# Patient Record
Sex: FEMALE | Race: WHITE | Employment: OTHER | URBAN - METROPOLITAN AREA
[De-identification: names, ages, dates, MRNs, and addresses within clinical notes are randomized per-mention and may not be internally consistent; named-entity substitution may affect disease eponyms.]

---

## 2017-01-03 ENCOUNTER — HOSPITAL ENCOUNTER (OUTPATIENT)
Dept: SURGERY | Age: 57
Discharge: HOME | End: 2017-01-03
Attending: SURGERY

## 2017-01-03 VITALS
SYSTOLIC BLOOD PRESSURE: 121 MMHG | OXYGEN SATURATION: 97 % | HEIGHT: 65 IN | TEMPERATURE: 98.6 F | RESPIRATION RATE: 16 BRPM | DIASTOLIC BLOOD PRESSURE: 61 MMHG | BODY MASS INDEX: 31.86 KG/M2 | WEIGHT: 191.25 LBS | HEART RATE: 101 BPM

## 2017-01-03 RX ORDER — TRAZODONE HYDROCHLORIDE 50 MG/1
200 TABLET ORAL
COMMUNITY
End: 2017-03-15 | Stop reason: CLARIF

## 2017-01-03 RX ORDER — CYCLOBENZAPRINE HCL 5 MG
10 TABLET ORAL
COMMUNITY
End: 2017-01-17 | Stop reason: SDUPTHER

## 2017-01-03 NOTE — PERIOP NOTES
Patient verified name, , and surgery as listed in [facility]. Type 1B surgery, PAT assessment complete. Labs per surgeon: none  Labs per anesthesia protocol: none needed  EKG: none needed    Hibiclens and instructions given per hospital policy. Patient provided with handouts including Guide to Surgery, Pain Management, Hand Hygiene, Blood Transfusion Education, and [facility] Brochure. Patient answered medical/surgical history questions at their best of ability. All prior to admission medications documented in [facility]. Original medication prescription bottles not visualized during patient appointment. Patient instructed to hold all vitamins 7 days prior to surgery and NSAIDS 5 days prior to surgery, patient verbalized understanding. Medications to be held- aleve/ibuprofen. Patient instructed to continue previous medications as prescribed prior to surgery and to take the following medications the day of surgery according to anesthesia guidelines with a small sip of water: baclofen, buspar, prozac, vimpat. Patient taught back and verbalized understanding.

## 2017-01-05 ENCOUNTER — ANESTHESIA EVENT (OUTPATIENT)
Dept: SURGERY | Age: 57
End: 2017-01-05
Payer: OTHER GOVERNMENT

## 2017-01-06 ENCOUNTER — ANESTHESIA (OUTPATIENT)
Dept: SURGERY | Age: 57
End: 2017-01-06
Payer: OTHER GOVERNMENT

## 2017-01-06 ENCOUNTER — APPOINTMENT (OUTPATIENT)
Dept: MAMMOGRAPHY | Age: 57
End: 2017-01-06
Attending: SURGERY
Payer: OTHER GOVERNMENT

## 2017-01-06 ENCOUNTER — SURGERY (OUTPATIENT)
Age: 57
End: 2017-01-06

## 2017-01-06 PROCEDURE — 77030003460 MAM PLC NDL/WIRE/CLIP/SEED BREAST RT

## 2017-01-06 PROCEDURE — 74011250636 HC RX REV CODE- 250/636: Performed by: ANESTHESIOLOGY

## 2017-01-06 PROCEDURE — 77030020143 HC AIRWY LARYN INTUB CGAS -A: Performed by: ANESTHESIOLOGY

## 2017-01-06 PROCEDURE — 77030020782 HC GWN BAIR PAWS FLX 3M -B: Performed by: ANESTHESIOLOGY

## 2017-01-06 PROCEDURE — 74011250636 HC RX REV CODE- 250/636

## 2017-01-06 PROCEDURE — 74011000250 HC RX REV CODE- 250

## 2017-01-06 RX ORDER — DEXAMETHASONE SODIUM PHOSPHATE 100 MG/10ML
INJECTION INTRAMUSCULAR; INTRAVENOUS AS NEEDED
Status: DISCONTINUED | OUTPATIENT
Start: 2017-01-06 | End: 2017-01-06 | Stop reason: HOSPADM

## 2017-01-06 RX ORDER — ONDANSETRON 2 MG/ML
INJECTION INTRAMUSCULAR; INTRAVENOUS AS NEEDED
Status: DISCONTINUED | OUTPATIENT
Start: 2017-01-06 | End: 2017-01-06 | Stop reason: HOSPADM

## 2017-01-06 RX ORDER — FENTANYL CITRATE 50 UG/ML
INJECTION, SOLUTION INTRAMUSCULAR; INTRAVENOUS AS NEEDED
Status: DISCONTINUED | OUTPATIENT
Start: 2017-01-06 | End: 2017-01-06 | Stop reason: HOSPADM

## 2017-01-06 RX ORDER — PROPOFOL 10 MG/ML
INJECTION, EMULSION INTRAVENOUS AS NEEDED
Status: DISCONTINUED | OUTPATIENT
Start: 2017-01-06 | End: 2017-01-06 | Stop reason: HOSPADM

## 2017-01-06 RX ORDER — LIDOCAINE HYDROCHLORIDE 20 MG/ML
INJECTION, SOLUTION EPIDURAL; INFILTRATION; INTRACAUDAL; PERINEURAL AS NEEDED
Status: DISCONTINUED | OUTPATIENT
Start: 2017-01-06 | End: 2017-01-06 | Stop reason: HOSPADM

## 2017-01-06 RX ADMIN — PROPOFOL 200 MG: 10 INJECTION, EMULSION INTRAVENOUS at 13:14

## 2017-01-06 RX ADMIN — DEXAMETHASONE SODIUM PHOSPHATE 10 MG: 100 INJECTION INTRAMUSCULAR; INTRAVENOUS at 13:31

## 2017-01-06 RX ADMIN — LIDOCAINE HYDROCHLORIDE 30 ML: 10 INJECTION, SOLUTION INFILTRATION; PERINEURAL at 13:47

## 2017-01-06 RX ADMIN — PROPOFOL 100 MG: 10 INJECTION, EMULSION INTRAVENOUS at 13:19

## 2017-01-06 RX ADMIN — FENTANYL CITRATE 25 MCG: 50 INJECTION, SOLUTION INTRAMUSCULAR; INTRAVENOUS at 13:30

## 2017-01-06 RX ADMIN — FENTANYL CITRATE 25 MCG: 50 INJECTION, SOLUTION INTRAMUSCULAR; INTRAVENOUS at 13:27

## 2017-01-06 RX ADMIN — FENTANYL CITRATE 50 MCG: 50 INJECTION, SOLUTION INTRAMUSCULAR; INTRAVENOUS at 13:14

## 2017-01-06 RX ADMIN — ONDANSETRON 4 MG: 2 INJECTION INTRAMUSCULAR; INTRAVENOUS at 13:31

## 2017-01-06 RX ADMIN — CEFAZOLIN 2 G: 1 INJECTION, POWDER, FOR SOLUTION INTRAMUSCULAR; INTRAVENOUS; PARENTERAL at 13:08

## 2017-01-06 RX ADMIN — LIDOCAINE HYDROCHLORIDE 80 MG: 20 INJECTION, SOLUTION EPIDURAL; INFILTRATION; INTRACAUDAL; PERINEURAL at 13:14

## 2017-01-06 RX ADMIN — SODIUM CHLORIDE, SODIUM LACTATE, POTASSIUM CHLORIDE, AND CALCIUM CHLORIDE: 600; 310; 30; 20 INJECTION, SOLUTION INTRAVENOUS at 13:31

## 2017-01-06 NOTE — ANESTHESIA POSTPROCEDURE EVALUATION
Post-Anesthesia Evaluation and Assessment    Patient: Lindsey George MRN: [id number]  SSN: [id number]    YOB: 1960  Age: 64 y.o. Sex: female       Cardiovascular Function/Vital Signs  Visit Vitals    /62    Pulse 84    Temp 36.7 °C (98 °F)    Resp 16    Ht 5' 5\" (1.651 m)    Wt 86.1 kg (189 lb 14.4 oz)    SpO2 95%    BMI 31.6 kg/m2       Patient is status post general anesthesia for Procedure(s):  RIGHT BREAST LUMPECTOMY  RIGHT BREAST NEEDLE LOCALIZED BIOPSY/ ARRIVE @ 1000 TO PREOP/ BREAST CENTER @ 1130 FOR RIGHT MAMMO NEEDLE LOC. Nausea/Vomiting: None    Postoperative hydration reviewed and adequate. Pain:  Pain Scale 1: Visual (01/06/17 1430)  Pain Intensity 1: 0 (01/06/17 1430)   Managed    Neurological Status:   Neuro (WDL): Exceptions to WDL (01/06/17 1400)  Neuro  Neurologic State: Drowsy (01/06/17 1402)  Cognition: Follows commands (01/06/17 1400)  LUE Motor Response: Purposeful (01/06/17 1400)  LLE Motor Response: Purposeful (01/06/17 1400)  RUE Motor Response: Purposeful (01/06/17 1400)  RLE Motor Response: Purposeful (01/06/17 1400)   At baseline    Mental Status and Level of Consciousness: Arousable    Pulmonary Status:   O2 Device: Room air (01/06/17 1430)   Adequate oxygenation and airway patent    Complications related to anesthesia: None    Post-anesthesia assessment completed.  No concerns    Signed By: Oracio Self MD     January 6, 2017

## 2017-01-06 NOTE — ANESTHESIA PREPROCEDURE EVALUATION
Anesthetic History   No history of anesthetic complications            Review of Systems / Medical History  Patient summary reviewed and pertinent labs reviewed    Pulmonary    COPD: moderate      Smoker  Asthma        Neuro/Psych     seizures: well controlled    Psychiatric history     Cardiovascular                  Exercise tolerance: >4 METS     GI/Hepatic/Renal  Within defined limits              Endo/Other        Arthritis     Other Findings              Physical Exam    Airway  Mallampati: I  TM Distance: > 6 cm  Neck ROM: normal range of motion   Mouth opening: Normal     Cardiovascular    Rhythm: regular           Dental  No notable dental hx       Pulmonary                 Abdominal  GI exam deferred       Other Findings            Anesthetic Plan    ASA: 3  Anesthesia type: general          Induction: Intravenous  Anesthetic plan and risks discussed with: Patient

## 2017-01-13 ENCOUNTER — HOSPITAL ENCOUNTER (OUTPATIENT)
Dept: MRI IMAGING | Age: 57
Discharge: HOME | End: 2017-01-13
Attending: SURGERY
Payer: OTHER GOVERNMENT

## 2017-01-13 DIAGNOSIS — C50.911 BREAST CANCER, RIGHT (HCC): ICD-10-CM

## 2017-01-13 PROCEDURE — 77059 MRI BREAST BI W WO CONT: CPT

## 2017-01-13 PROCEDURE — 74011250636 HC RX REV CODE- 250/636: Performed by: SURGERY

## 2017-01-13 PROCEDURE — 74011000258 HC RX REV CODE- 258: Performed by: SURGERY

## 2017-01-13 PROCEDURE — A9577 INJ MULTIHANCE: HCPCS | Performed by: SURGERY

## 2017-01-13 RX ORDER — SODIUM CHLORIDE 0.9 % (FLUSH) 0.9 %
10 SYRINGE (ML) INJECTION
Status: COMPLETED | OUTPATIENT
Start: 2017-01-13 | End: 2017-01-13

## 2017-01-13 RX ADMIN — GADOBENATE DIMEGLUMINE 17 ML: 529 INJECTION, SOLUTION INTRAVENOUS at 10:07

## 2017-01-13 RX ADMIN — SODIUM CHLORIDE 100 ML: 900 INJECTION, SOLUTION INTRAVENOUS at 10:07

## 2017-01-13 RX ADMIN — Medication 10 ML: at 10:07

## 2017-01-17 PROBLEM — D05.11 DUCTAL CARCINOMA IN SITU (DCIS) OF RIGHT BREAST: Status: ACTIVE | Noted: 2017-01-17

## 2017-01-24 ENCOUNTER — PATIENT OUTREACH (OUTPATIENT)
Dept: CASE MANAGEMENT | Age: 57
End: 2017-01-24

## 2017-01-24 NOTE — ACP (ADVANCE CARE PLANNING)
Patient here in follow up with Dr. Bj Ricci - she is post lumpectomy but on path the superior margin was positive and now will need to have a shaving of this site which is an outpatient procedure. The patient also is having back problems which requires surgery - she is scheduled for spine surgery with Dr. Natalie Flannery on 2-9-17. Dr. Bj Ricci reassured patient that this could wait until she recovered from her spine surgery. The patient to call the office and let us know when she is recovered enough to have the breast surgery.

## 2017-02-01 ENCOUNTER — HOSPITAL ENCOUNTER (OUTPATIENT)
Dept: SURGERY | Age: 57
Discharge: HOME | End: 2017-02-01

## 2017-02-01 NOTE — PERIOP NOTES
No Show.  Called and spoke with Dr Kerline Couch office and they stated that patient had called and needed to reschedule appointment

## 2017-02-15 ENCOUNTER — ANESTHESIA EVENT (OUTPATIENT)
Dept: SURGERY | Age: 57
End: 2017-02-15
Payer: OTHER GOVERNMENT

## 2017-02-16 ENCOUNTER — ANESTHESIA (OUTPATIENT)
Dept: SURGERY | Age: 57
End: 2017-02-16
Payer: OTHER GOVERNMENT

## 2017-02-16 ENCOUNTER — HOSPITAL ENCOUNTER (OUTPATIENT)
Age: 57
Setting detail: OUTPATIENT SURGERY
Discharge: HOME | End: 2017-02-16
Attending: SURGERY | Admitting: SURGERY
Payer: OTHER GOVERNMENT

## 2017-02-16 ENCOUNTER — SURGERY (OUTPATIENT)
Age: 57
End: 2017-02-16

## 2017-02-16 VITALS
DIASTOLIC BLOOD PRESSURE: 72 MMHG | BODY MASS INDEX: 31.99 KG/M2 | SYSTOLIC BLOOD PRESSURE: 116 MMHG | HEART RATE: 89 BPM | RESPIRATION RATE: 20 BRPM | WEIGHT: 192 LBS | OXYGEN SATURATION: 94 % | TEMPERATURE: 97.6 F | HEIGHT: 65 IN

## 2017-02-16 PROCEDURE — 77030020782 HC GWN BAIR PAWS FLX 3M -B: Performed by: ANESTHESIOLOGY

## 2017-02-16 PROCEDURE — 77030020143 HC AIRWY LARYN INTUB CGAS -A: Performed by: ANESTHESIOLOGY

## 2017-02-16 PROCEDURE — 77030031139 HC SUT VCRL2 J&J -A: Performed by: SURGERY

## 2017-02-16 PROCEDURE — 74011250636 HC RX REV CODE- 250/636: Performed by: ANESTHESIOLOGY

## 2017-02-16 PROCEDURE — 74011000250 HC RX REV CODE- 250: Performed by: SURGERY

## 2017-02-16 PROCEDURE — 76210000016 HC OR PH I REC 1 TO 1.5 HR: Performed by: SURGERY

## 2017-02-16 PROCEDURE — 74011250636 HC RX REV CODE- 250/636

## 2017-02-16 PROCEDURE — 77030003028 HC SUT VCRL J&J -A: Performed by: SURGERY

## 2017-02-16 PROCEDURE — 77030002933 HC SUT MCRYL J&J -A: Performed by: SURGERY

## 2017-02-16 PROCEDURE — 74011250636 HC RX REV CODE- 250/636: Performed by: SURGERY

## 2017-02-16 PROCEDURE — 76060000033 HC ANESTHESIA 1 TO 1.5 HR: Performed by: SURGERY

## 2017-02-16 PROCEDURE — 77030011640 HC PAD GRND REM COVD -A: Performed by: SURGERY

## 2017-02-16 PROCEDURE — 77030032490 HC SLV COMPR SCD KNE COVD -B: Performed by: SURGERY

## 2017-02-16 PROCEDURE — 88305 TISSUE EXAM BY PATHOLOGIST: CPT | Performed by: SURGERY

## 2017-02-16 PROCEDURE — 77030018836 HC SOL IRR NACL ICUM -A: Performed by: SURGERY

## 2017-02-16 PROCEDURE — 77030002996 HC SUT SLK J&J -A: Performed by: SURGERY

## 2017-02-16 PROCEDURE — 76210000020 HC REC RM PH II FIRST 0.5 HR: Performed by: SURGERY

## 2017-02-16 PROCEDURE — 74011250637 HC RX REV CODE- 250/637: Performed by: ANESTHESIOLOGY

## 2017-02-16 PROCEDURE — 74011000250 HC RX REV CODE- 250

## 2017-02-16 PROCEDURE — 76010000138 HC OR TIME 0.5 TO 1 HR: Performed by: SURGERY

## 2017-02-16 RX ORDER — KETOROLAC TROMETHAMINE 30 MG/ML
30 INJECTION, SOLUTION INTRAMUSCULAR; INTRAVENOUS ONCE
Status: COMPLETED | OUTPATIENT
Start: 2017-02-16 | End: 2017-02-16

## 2017-02-16 RX ORDER — HYDROMORPHONE HYDROCHLORIDE 2 MG/ML
0.5 INJECTION, SOLUTION INTRAMUSCULAR; INTRAVENOUS; SUBCUTANEOUS
Status: DISCONTINUED | OUTPATIENT
Start: 2017-02-16 | End: 2017-02-16 | Stop reason: HOSPADM

## 2017-02-16 RX ORDER — SODIUM CHLORIDE, SODIUM LACTATE, POTASSIUM CHLORIDE, CALCIUM CHLORIDE 600; 310; 30; 20 MG/100ML; MG/100ML; MG/100ML; MG/100ML
100 INJECTION, SOLUTION INTRAVENOUS CONTINUOUS
Status: DISCONTINUED | OUTPATIENT
Start: 2017-02-16 | End: 2017-02-16 | Stop reason: HOSPADM

## 2017-02-16 RX ORDER — SODIUM CHLORIDE 0.9 % (FLUSH) 0.9 %
5-10 SYRINGE (ML) INJECTION AS NEEDED
Status: DISCONTINUED | OUTPATIENT
Start: 2017-02-16 | End: 2017-02-16 | Stop reason: HOSPADM

## 2017-02-16 RX ORDER — FAMOTIDINE 20 MG/1
20 TABLET, FILM COATED ORAL ONCE
Status: COMPLETED | OUTPATIENT
Start: 2017-02-16 | End: 2017-02-16

## 2017-02-16 RX ORDER — LIDOCAINE HYDROCHLORIDE 10 MG/ML
INJECTION INFILTRATION; PERINEURAL AS NEEDED
Status: DISCONTINUED | OUTPATIENT
Start: 2017-02-16 | End: 2017-02-16 | Stop reason: HOSPADM

## 2017-02-16 RX ORDER — DEXAMETHASONE SODIUM PHOSPHATE 4 MG/ML
INJECTION, SOLUTION INTRA-ARTICULAR; INTRALESIONAL; INTRAMUSCULAR; INTRAVENOUS; SOFT TISSUE AS NEEDED
Status: DISCONTINUED | OUTPATIENT
Start: 2017-02-16 | End: 2017-02-16 | Stop reason: HOSPADM

## 2017-02-16 RX ORDER — CEFAZOLIN SODIUM IN 0.9 % NACL 2 G/50 ML
2 INTRAVENOUS SOLUTION, PIGGYBACK (ML) INTRAVENOUS ONCE
Status: COMPLETED | OUTPATIENT
Start: 2017-02-16 | End: 2017-02-16

## 2017-02-16 RX ORDER — HYDROMORPHONE HYDROCHLORIDE 1 MG/ML
0.5 INJECTION, SOLUTION INTRAMUSCULAR; INTRAVENOUS; SUBCUTANEOUS
Status: COMPLETED | OUTPATIENT
Start: 2017-02-16 | End: 2017-02-16

## 2017-02-16 RX ORDER — ACETAMINOPHEN 10 MG/ML
1000 INJECTION, SOLUTION INTRAVENOUS ONCE
Status: COMPLETED | OUTPATIENT
Start: 2017-02-16 | End: 2017-02-16

## 2017-02-16 RX ORDER — SODIUM CHLORIDE 0.9 % (FLUSH) 0.9 %
5-10 SYRINGE (ML) INJECTION EVERY 8 HOURS
Status: DISCONTINUED | OUTPATIENT
Start: 2017-02-16 | End: 2017-02-16 | Stop reason: HOSPADM

## 2017-02-16 RX ORDER — LIDOCAINE HYDROCHLORIDE 20 MG/ML
INJECTION, SOLUTION EPIDURAL; INFILTRATION; INTRACAUDAL; PERINEURAL AS NEEDED
Status: DISCONTINUED | OUTPATIENT
Start: 2017-02-16 | End: 2017-02-16 | Stop reason: HOSPADM

## 2017-02-16 RX ORDER — OXYCODONE HYDROCHLORIDE 5 MG/1
5 TABLET ORAL
Status: DISCONTINUED | OUTPATIENT
Start: 2017-02-16 | End: 2017-02-16 | Stop reason: HOSPADM

## 2017-02-16 RX ORDER — PROPOFOL 10 MG/ML
INJECTION, EMULSION INTRAVENOUS AS NEEDED
Status: DISCONTINUED | OUTPATIENT
Start: 2017-02-16 | End: 2017-02-16 | Stop reason: HOSPADM

## 2017-02-16 RX ORDER — SODIUM CHLORIDE 9 MG/ML
50 INJECTION, SOLUTION INTRAVENOUS CONTINUOUS
Status: DISCONTINUED | OUTPATIENT
Start: 2017-02-16 | End: 2017-02-16 | Stop reason: HOSPADM

## 2017-02-16 RX ORDER — OXYCODONE AND ACETAMINOPHEN 5; 325 MG/1; MG/1
TABLET ORAL
Qty: 24 TAB | Refills: 0 | Status: SHIPPED | OUTPATIENT
Start: 2017-02-16 | End: 2017-03-15 | Stop reason: ALTCHOICE

## 2017-02-16 RX ORDER — MIDAZOLAM HYDROCHLORIDE 1 MG/ML
2 INJECTION, SOLUTION INTRAMUSCULAR; INTRAVENOUS
Status: DISCONTINUED | OUTPATIENT
Start: 2017-02-16 | End: 2017-02-16 | Stop reason: HOSPADM

## 2017-02-16 RX ORDER — OXYCODONE AND ACETAMINOPHEN 5; 325 MG/1; MG/1
1 TABLET ORAL AS NEEDED
Status: DISCONTINUED | OUTPATIENT
Start: 2017-02-16 | End: 2017-02-16 | Stop reason: HOSPADM

## 2017-02-16 RX ORDER — MIDAZOLAM HYDROCHLORIDE 1 MG/ML
2 INJECTION, SOLUTION INTRAMUSCULAR; INTRAVENOUS ONCE
Status: DISCONTINUED | OUTPATIENT
Start: 2017-02-16 | End: 2017-02-16 | Stop reason: HOSPADM

## 2017-02-16 RX ORDER — FENTANYL CITRATE 50 UG/ML
25 INJECTION, SOLUTION INTRAMUSCULAR; INTRAVENOUS ONCE
Status: DISCONTINUED | OUTPATIENT
Start: 2017-02-16 | End: 2017-02-16 | Stop reason: HOSPADM

## 2017-02-16 RX ORDER — LIDOCAINE HYDROCHLORIDE 10 MG/ML
0.1 INJECTION INFILTRATION; PERINEURAL AS NEEDED
Status: DISCONTINUED | OUTPATIENT
Start: 2017-02-16 | End: 2017-02-16 | Stop reason: HOSPADM

## 2017-02-16 RX ORDER — DIPHENHYDRAMINE HYDROCHLORIDE 50 MG/ML
12.5 INJECTION, SOLUTION INTRAMUSCULAR; INTRAVENOUS ONCE
Status: DISCONTINUED | OUTPATIENT
Start: 2017-02-16 | End: 2017-02-16 | Stop reason: HOSPADM

## 2017-02-16 RX ORDER — ONDANSETRON 2 MG/ML
INJECTION INTRAMUSCULAR; INTRAVENOUS AS NEEDED
Status: DISCONTINUED | OUTPATIENT
Start: 2017-02-16 | End: 2017-02-16 | Stop reason: HOSPADM

## 2017-02-16 RX ADMIN — LIDOCAINE HYDROCHLORIDE 20 ML: 10 INJECTION, SOLUTION INFILTRATION; PERINEURAL at 11:10

## 2017-02-16 RX ADMIN — HYDROMORPHONE HYDROCHLORIDE 0.5 MG: 2 INJECTION, SOLUTION INTRAMUSCULAR; INTRAVENOUS; SUBCUTANEOUS at 11:40

## 2017-02-16 RX ADMIN — HYDROMORPHONE HYDROCHLORIDE 0.5 MG: 2 INJECTION, SOLUTION INTRAMUSCULAR; INTRAVENOUS; SUBCUTANEOUS at 11:35

## 2017-02-16 RX ADMIN — OXYCODONE HYDROCHLORIDE 5 MG: 5 TABLET ORAL at 11:36

## 2017-02-16 RX ADMIN — DEXAMETHASONE SODIUM PHOSPHATE 4 MG: 4 INJECTION, SOLUTION INTRA-ARTICULAR; INTRALESIONAL; INTRAMUSCULAR; INTRAVENOUS; SOFT TISSUE at 10:45

## 2017-02-16 RX ADMIN — LIDOCAINE HYDROCHLORIDE 100 MG: 20 INJECTION, SOLUTION EPIDURAL; INFILTRATION; INTRACAUDAL; PERINEURAL at 10:29

## 2017-02-16 RX ADMIN — CEFAZOLIN 2 G: 1 INJECTION, POWDER, FOR SOLUTION INTRAMUSCULAR; INTRAVENOUS; PARENTERAL at 10:30

## 2017-02-16 RX ADMIN — PROPOFOL 200 MG: 10 INJECTION, EMULSION INTRAVENOUS at 10:29

## 2017-02-16 RX ADMIN — KETOROLAC TROMETHAMINE 30 MG: 30 INJECTION, SOLUTION INTRAMUSCULAR at 12:21

## 2017-02-16 RX ADMIN — ACETAMINOPHEN 1000 MG: 10 INJECTION, SOLUTION INTRAVENOUS at 12:34

## 2017-02-16 RX ADMIN — HYDROMORPHONE HYDROCHLORIDE 0.5 MG: 1 INJECTION, SOLUTION INTRAMUSCULAR; INTRAVENOUS; SUBCUTANEOUS at 10:18

## 2017-02-16 RX ADMIN — SODIUM CHLORIDE, SODIUM LACTATE, POTASSIUM CHLORIDE, AND CALCIUM CHLORIDE 100 ML/HR: 600; 310; 30; 20 INJECTION, SOLUTION INTRAVENOUS at 09:25

## 2017-02-16 RX ADMIN — HYDROMORPHONE HYDROCHLORIDE 0.5 MG: 2 INJECTION, SOLUTION INTRAMUSCULAR; INTRAVENOUS; SUBCUTANEOUS at 11:30

## 2017-02-16 RX ADMIN — FAMOTIDINE 20 MG: 20 TABLET ORAL at 09:30

## 2017-02-16 RX ADMIN — HYDROMORPHONE HYDROCHLORIDE 0.5 MG: 2 INJECTION, SOLUTION INTRAMUSCULAR; INTRAVENOUS; SUBCUTANEOUS at 11:45

## 2017-02-16 RX ADMIN — HYDROMORPHONE HYDROCHLORIDE 0.5 MG: 1 INJECTION, SOLUTION INTRAMUSCULAR; INTRAVENOUS; SUBCUTANEOUS at 09:48

## 2017-02-16 RX ADMIN — ONDANSETRON 4 MG: 2 INJECTION INTRAMUSCULAR; INTRAVENOUS at 10:45

## 2017-02-16 NOTE — ANESTHESIA PREPROCEDURE EVALUATION
Anesthetic History               Review of Systems / Medical History  Patient summary reviewed and pertinent labs reviewed    Pulmonary    COPD: moderate               Neuro/Psych     seizures: well controlled         Cardiovascular    Hypertension              Exercise tolerance: <4 METS     GI/Hepatic/Renal           PUD     Endo/Other        Arthritis     Other Findings   Comments: Previous vocal cord polyps and abscess under right mandible from tooth pull           Physical Exam    Airway  Mallampati: II  TM Distance: > 6 cm  Neck ROM: normal range of motion   Mouth opening: Normal     Cardiovascular  Regular rate and rhythm,  S1 and S2 normal,  no murmur, click, rub, or gallop  Rhythm: regular  Rate: normal         Dental  No notable dental hx       Pulmonary        Wheezes:bibasilar         Abdominal         Other Findings            Anesthetic Plan    ASA: 3  Anesthesia type: general            Anesthetic plan and risks discussed with: Patient

## 2017-02-16 NOTE — IP AVS SNAPSHOT
Edwin Castellanos 
 
 
 [address] [facility] [address] 
[phone] Patient: Velia Maldonado 
MRN: [id number] QRI:[id number] You are allergic to the following Allergen Reactions Levaquin (Levofloxacin) Rash Lyrica (Pregabalin) Swelling Sulfa (Sulfonamide Antibiotics) Itching Nausea Only Recent Documentation Height Weight BMI OB Status Smoking Status 1.651 m 87.1 kg 31.95 kg/m2 Postmenopausal Current Every Day Smoker Emergency Contacts Name Discharge Info Relation Home Work Mobile [address] CAREGIVER [3] Spouse [3]   [phone] Tray Heaton DISCHARGE CAREGIVER [3] Friend [5]   [phone] About your hospitalization You were admitted on:  February 16, 2017 [facility] You were discharged on:  February 16, 2017 Unit phone number:  [phone] Why you were hospitalized Your primary diagnosis was:  Not on File Providers Seen During Your Hospitalizations Provider Role Specialty Primary office phone Jarred Jj MD Attending Provider General Surgery [phone] Your Primary Care Physician (PCP) Primary Care Physician Office Phone Office Fax Hannah Steel [phone] Follow-up Information Follow up With Details Comments Contact Info Hedy Osborne MD   [facility] [place] [address] [address] Ronny Jaramillo 
[phone] Jarred Jj MD Follow up on 3/2/2017 Please follow up in the office at 2:45PM Kenya Franco [address] [place] [address] 
[phone] Your Appointments Thursday March 02, 2017  2:45 PM EST Global Post Op with Jarred Jj MD  
[facility] ([facility] MAIN) [address] [place] [address]  
[phone]  Wednesday March 15, 2017 10:30 AM EDT  
 SPINE PREHAB with [facility] ASSESSMENT  [address] [facility] PRE [facility]ESSMENT ([facility] OR PRE ASSESSMENT) [address] [facility]  
[phone] Thursday March 23, 2017 SPINE TRANSFORAMINAL LUMBAR INTERBODY FUSION (TLIF) WITH PERCUTANEOUS SCREW FIXATION with Rasta Rodrigues MD  
[facility] SURGERY ([address] [place] [address]) [address] [facility]  
[phone] Current Discharge Medication List  
  
START taking these medications Dose & Instructions Dispensing Information Comments Morning Noon Evening Bedtime  
 oxyCODONE-acetaminophen 5-325 mg per tablet Commonly known as:  PERCOCET Your next dose is: Today, Tomorrow Other:  _________  
   
   
 1-2 tabs by mouth every four hours prn pain Quantity:  24 Tab Refills:  0 CONTINUE these medications which have CHANGED Dose & Instructions Dispensing Information Comments Morning Noon Evening Bedtime  
 cyclobenzaprine 5 mg tablet Commonly known as:  FLEXERIL What changed:  additional instructions Your next dose is: Today, Tomorrow Other:  _________ Dose:  10 mg Take 2 Tabs by mouth nightly. Quantity:  20 Tab Refills:  0 CONTINUE these medications which have NOT CHANGED Dose & Instructions Dispensing Information Comments Morning Noon Evening Bedtime ALEVE-D SINUS AND COLD 220-120 mg Tb12 Generic drug:  naproxen na-pseudoephedrine Your next dose is: Today, Tomorrow Other:  _________ Take  by mouth as needed. Last taken 2/14/17 Refills:  0 AMBIEN CR 12.5 mg tablet Generic drug:  zolpidem CR Your next dose is: Today, Tomorrow Other:  _________ Dose:  12.5 mg Take 12.5 mg by mouth nightly as needed. Refills:  0 LEXAPRO 20 mg tablet Generic drug:  escitalopram oxalate Your next dose is: Today, Tomorrow Other:  _________ Dose:  20 mg Take 20 mg by mouth every morning. Refills:  0  
     
   
   
   
  
 traZODone 50 mg tablet Commonly known as:  Dasha Cast Your next dose is: Today, Tomorrow Other:  _________ Dose:  200 mg Take 200 mg by mouth nightly. Refills:  0 VIMPAT 200 mg Tab tablet Generic drug:  lacosamide Your next dose is: Today, Tomorrow Other:  _________ Dose:  200 mg Take 200 mg by mouth two (2) times a day. Refills:  0 Where to Get Your Medications Information on where to get these meds will be given to you by the nurse or doctor. ! Ask your nurse or doctor about these medications  
  oxyCODONE-acetaminophen 5-325 mg per tablet Discharge Instructions May shower tomorrow Lumpectomy: What to Expect at [facility] Your Recovery For 1 or 2 days after the surgery you will probably feel tired and have some pain. The skin around the cut (incision) may feel firm, swollen, and tender, and be bruised. Tenderness should go away in about 2 or 3 days, and the bruising within 2 weeks. Firmness and swelling may last for 3 to 6 months. You may also feel either numbness or tingling (\"pins and needles\") in your armpit or on the inside of your upper arm. This should improve over the next several weeks. Some women have numbness for a longer time. You may feel a soft lump in your breast that gradually turns hard. This is the incision healing. It is not cancer. You should wear a well-fitted and supportive bra, even during the night, for 1 week. You will probably be able to go back to work or your normal routine in 1 to 3 weeks after the surgery. This may depend on whether you have more treatment.  
When you find out that you have cancer, you may feel many emotions and may need some help coping. Seek out family, friends, and counselors for support. You also can do things at home to make yourself feel better while you go through treatment. Call the [facility] [facility] ([phone]) or visit its website at [address]. Tanium for more information. Your doctor may have removed some lymph nodes in your armpit to see if the cancer has spread. If this is the case, your recovery will be different. This care sheet gives you a general idea about how long it will take for you to recover. But each person recovers at a different pace. Follow the steps below to get better as quickly as possible. How can you care for yourself at home? Activity · Rest when you feel tired. Getting enough sleep will help you recover. You may want to sleep on the side that has not been operated on. Use a pillow to support the affected breast while lying on your side. · Avoid strenuous activities, such as biking, jogging, weightlifting, or aerobic exercise, for 1 month or until your doctor says it is okay. This may include housework, such as washing windows, especially if you have to use the arm next to the affected breast. 
· Most women can return to their normal activities within 2 weeks. · Try to walk each day. Start out by walking a little more than you did the day before. Bit by bit, increase the amount you walk. Walking boosts blood flow and helps prevent pneumonia and constipation. · For 1 to 2 weeks, avoid lifting anything over 10 to 15 pounds or that would make you strain. This may include heavy grocery bags and milk containers, a heavy briefcase or backpack, cat litter or dog food bags, a vacuum , or a child. · You may drive when you are no longer taking pain medicine and can use your arm without pain. Talk to your doctor about when to start driving, especially if you are having radiation treatments.  
· You will probably be able to go back to work or your normal routine in 1 to 3 weeks. It may be longer, depending on the type of work you do and whether you are having radiation or chemotherapy. · You may shower 24 to 48 hours after surgery, if your doctor okays it. Pat the incision dry. Do not take a bath for the first 2 weeks, or until your doctor tells you it is okay. Diet · You can eat your normal diet. If your stomach is upset, try bland, low-fat foods like plain rice, broiled chicken, toast, and yogurt. · You may notice that your bowel movements are not regular right after your surgery. This is common. Try to avoid constipation and straining with bowel movements. You may want to take a fiber supplement every day. If you have not had a bowel movement after a couple of days, ask your doctor about taking a mild laxative. Medicines · Your doctor will tell you if and when you can restart your medicines. He or she will also give you instructions about taking any new medicines. · If you take blood thinners, such as warfarin (Coumadin), clopidogrel (Plavix), or aspirin, be sure to talk to your doctor. He or she will tell you if and when to start taking those medicines again. Make sure that you understand exactly what your doctor wants you to do. · Take pain medicines exactly as directed. ¨ Your doctor may have given you a medicine to numb the area inside and around your cut (incision). The numbness will last from 6 to 12 hours. If you went home right after the surgery, you may want to take pain medicine before this wears off. ¨ If the doctor gave you a prescription medicine for pain, take it as prescribed. ¨ If you are not taking a prescription pain medicine, ask your doctor if you can take an over-the-counter medicine. · If your doctor prescribed antibiotics, take them as directed. Do not stop taking them just because you feel better. You need to take the full course of antibiotics.  
· If you think your pain medicine is making you sick to your stomach: 
 ¨ Take your medicine after meals (unless your doctor has told you not to). ¨ Ask your doctor for a different pain medicine. Incision care · If you have strips of tape on the cut the doctor made (incision), leave the tape on for a week or until it falls off. · When you can shower, wash the area daily with warm, soapy water and pat it dry. Follow-up care is a key part of your treatment and safety. Be sure to make and go to all appointments, and call your doctor if you are having problems. It's also a good idea to know your test results and keep a list of the medicines you take. When should you call for help? Call 911 anytime you think you may need emergency care. For example, call if: 
· You passed out (lost consciousness). · You have severe trouble breathing. · You have sudden chest pain and shortness of breath, or you cough up blood. Call your doctor now or seek immediate medical care if: 
· You have pain that does not get better when you take your pain medicine. · You have signs of infection, such as: 
¨ Increased pain, swelling, warmth, or redness. ¨ Red streaks leading from the incision. ¨ Pus draining from the incision. ¨ A fever. · You have loose stitches or an open incision. · You have sudden swelling of your arm, hands, or fingers. · Bright red blood has soaked through the bandage over your incision. Watch closely for changes in your health, and be sure to contact your doctor if: 
· You see fluid leaking from either nipple. · Your swelling gets worse. · Your swelling is not going down. · You do not have a bowel movement after taking a laxative. Where can you learn more? Go to http://cari-aguila.info/. Enter D222 in the search box to learn more about \"Lumpectomy: What to Expect at Home. \" Current as of: July 26, 2016 Content Version: 11.1 © 3334-8705 [employer].  Care instructions adapted under license by [address] (which disclaims liability or warranty for this information). If you have questions about a medical condition or this instruction, always ask your healthcare professional. [address] any warranty or liability for your use of this information. After general anesthesia or intravenous sedation, for 24 hours or while taking prescription Narcotics: · Limit your activities · Do not drive and operate hazardous machinery · Do not make important personal or business decisions · Do  not drink alcoholic beverages · If you have not urinated within 8 hours after discharge, please contact your surgeon on call. *  Please give a list of your current medications to your Primary Care Provider. *  Please update this list whenever your medications are discontinued, doses are 
    changed, or new medications (including over-the-counter products) are added. *  Please carry medication information at all times in case of emergency situations. These are general instructions for a healthy lifestyle: No smoking/ No tobacco products/ Avoid exposure to second hand smoke Surgeon General's Warning:  Quitting smoking now greatly reduces serious risk to your health. Obesity, smoking, and sedentary lifestyle greatly increases your risk for illness A healthy diet, regular physical exercise & weight monitoring are important for maintaining a healthy lifestyle You may be retaining fluid if you have a history of heart failure or if you experience any of the following symptoms:  Weight gain of 3 pounds or more overnight or 5 pounds in a week, increased swelling in our hands or feet or shortness of breath while lying flat in bed. Please call your doctor as soon as you notice any of these symptoms; do not wait until your next office visit. Recognize signs and symptoms of STROKE: 
 
F-face looks uneven A-arms unable to move or move unevenly S-speech slurred or non-existent T-time-call 911 as soon as signs and symptoms begin-DO NOT go Back to bed or wait to see if you get better-TIME IS BRAIN. Discharge Orders None Introducing [facility]! Dear Aracely Wilson: Thank you for requesting a [employer] account. Our records indicate that you already have an active [employer] account. You can access your account anytime at [email or web address] Did you know that you can access your hospital and ER discharge instructions at any time in [employer]? You can also review all of your test results from your hospital stay or ER visit. Additional Information If you have questions, please visit the Frequently Asked Questions section of the [employer] website at [email or web address] Remember, [employer] is NOT to be used for urgent needs. For medical emergencies, dial 911. Now available from your iPhone and Android! General Information Please provide this summary of care documentation to your next provider. Patient Signature:  ____________________________________________________________ Date:  ____________________________________________________________  
  
Tracey Choudhary Provider Signature:  ____________________________________________________________ Date:  ____________________________________________________________

## 2017-02-16 NOTE — ANESTHESIA POSTPROCEDURE EVALUATION
Post-Anesthesia Evaluation and Assessment    Patient: Nani Herman MRN: [id number]  SSN: [id number]    YOB: 1960  Age: 64 y.o. Sex: female       Cardiovascular Function/Vital Signs  Visit Vitals    /64    Pulse 90    Temp 36.7 °C (98 °F)    Resp 18    Ht 5' 5\" (1.651 m)    Wt 87.1 kg (192 lb)    SpO2 96%    BMI 31.95 kg/m2       Patient is status post general anesthesia for Procedure(s):  EXCISION OF SUPERIOR LUMPECTOMY MARGIN/ RIGHT BREAST. Nausea/Vomiting: None    Postoperative hydration reviewed and adequate. Pain:  Pain Scale 1: Numeric (0 - 10) (02/16/17 1236)  Pain Intensity 1: 6 (02/16/17 1236)   Managed    Neurological Status:   Neuro (WDL): Within Defined Limits (02/16/17 1126)  Neuro  LUE Motor Response: Purposeful (02/16/17 1126)  LLE Motor Response: Purposeful (02/16/17 1126)  RUE Motor Response: Purposeful (02/16/17 1126)  RLE Motor Response: Purposeful (02/16/17 1126)   At baseline    Mental Status and Level of Consciousness: Arousable    Pulmonary Status:   O2 Device: Room air (02/16/17 1226)   Adequate oxygenation and airway patent    Complications related to anesthesia: None    Post-anesthesia assessment completed.  No concerns    Signed By: Benedict Clayton MD     February 16, 2017

## 2017-02-16 NOTE — PERIOP NOTES
PT and family verbalized understanding of discharge paperwork including reasons to call MD, s/s of infection, diet, follow up, activity, medication (time to take next dose, signs of respiratory depression and interaction with home medication) and wound care.  Denies questions

## 2017-02-16 NOTE — DISCHARGE INSTRUCTIONS
May shower tomorrow       Lumpectomy: What to Expect at [facility]    For 1 or 2 days after the surgery you will probably feel tired and have some pain. The skin around the cut (incision) may feel firm, swollen, and tender, and be bruised. Tenderness should go away in about 2 or 3 days, and the bruising within 2 weeks. Firmness and swelling may last for 3 to 6 months. You may also feel either numbness or tingling (\"pins and needles\") in your armpit or on the inside of your upper arm. This should improve over the next several weeks. Some women have numbness for a longer time. You may feel a soft lump in your breast that gradually turns hard. This is the incision healing. It is not cancer. You should wear a well-fitted and supportive bra, even during the night, for 1 week. You will probably be able to go back to work or your normal routine in 1 to 3 weeks after the surgery. This may depend on whether you have more treatment. When you find out that you have cancer, you may feel many emotions and may need some help coping. Seek out family, friends, and counselors for support. You also can do things at home to make yourself feel better while you go through treatment. Call the [employer] (0-304.621.6011) or visit its website at [address] [employer] for more information. Your doctor may have removed some lymph nodes in your armpit to see if the cancer has spread. If this is the case, your recovery will be different. This care sheet gives you a general idea about how long it will take for you to recover. But each person recovers at a different pace. Follow the steps below to get better as quickly as possible. How can you care for yourself at home? Activity  · Rest when you feel tired. Getting enough sleep will help you recover. You may want to sleep on the side that has not been operated on. Use a pillow to support the affected breast while lying on your side.   · Avoid strenuous activities, such as biking, jogging, weightlifting, or aerobic exercise, for 1 month or until your doctor says it is okay. This may include housework, such as washing windows, especially if you have to use the arm next to the affected breast.  · Most women can return to their normal activities within 2 weeks. · Try to walk each day. Start out by walking a little more than you did the day before. Bit by bit, increase the amount you walk. Walking boosts blood flow and helps prevent pneumonia and constipation. · For 1 to 2 weeks, avoid lifting anything over 10 to 15 pounds or that would make you strain. This may include heavy grocery bags and milk containers, a heavy briefcase or backpack, cat litter or dog food bags, a vacuum , or a child. · You may drive when you are no longer taking pain medicine and can use your arm without pain. Talk to your doctor about when to start driving, especially if you are having radiation treatments. · You will probably be able to go back to work or your normal routine in 1 to 3 weeks. It may be longer, depending on the type of work you do and whether you are having radiation or chemotherapy. · You may shower 24 to 48 hours after surgery, if your doctor okays it. Pat the incision dry. Do not take a bath for the first 2 weeks, or until your doctor tells you it is okay. Diet  · You can eat your normal diet. If your stomach is upset, try bland, low-fat foods like plain rice, broiled chicken, toast, and yogurt. · You may notice that your bowel movements are not regular right after your surgery. This is common. Try to avoid constipation and straining with bowel movements. You may want to take a fiber supplement every day. If you have not had a bowel movement after a couple of days, ask your doctor about taking a mild laxative. Medicines  · Your doctor will tell you if and when you can restart your medicines. He or she will also give you instructions about taking any new medicines.   · If you take blood thinners, such as warfarin (Coumadin), clopidogrel (Plavix), or aspirin, be sure to talk to your doctor. He or she will tell you if and when to start taking those medicines again. Make sure that you understand exactly what your doctor wants you to do. · Take pain medicines exactly as directed. ¨ Your doctor may have given you a medicine to numb the area inside and around your cut (incision). The numbness will last from 6 to 12 hours. If you went home right after the surgery, you may want to take pain medicine before this wears off. ¨ If the doctor gave you a prescription medicine for pain, take it as prescribed. ¨ If you are not taking a prescription pain medicine, ask your doctor if you can take an over-the-counter medicine. · If your doctor prescribed antibiotics, take them as directed. Do not stop taking them just because you feel better. You need to take the full course of antibiotics. · If you think your pain medicine is making you sick to your stomach:  ¨ Take your medicine after meals (unless your doctor has told you not to). ¨ Ask your doctor for a different pain medicine. Incision care  · If you have strips of tape on the cut the doctor made (incision), leave the tape on for a week or until it falls off. · When you can shower, wash the area daily with warm, soapy water and pat it dry. Follow-up care is a key part of your treatment and safety. Be sure to make and go to all appointments, and call your doctor if you are having problems. It's also a good idea to know your test results and keep a list of the medicines you take. When should you call for help? Call 911 anytime you think you may need emergency care. For example, call if:  · You passed out (lost consciousness). · You have severe trouble breathing. · You have sudden chest pain and shortness of breath, or you cough up blood.   Call your doctor now or seek immediate medical care if:  · You have pain that does not get better when you take your pain medicine. · You have signs of infection, such as:  ¨ Increased pain, swelling, warmth, or redness. ¨ Red streaks leading from the incision. ¨ Pus draining from the incision. ¨ A fever. · You have loose stitches or an open incision. · You have sudden swelling of your arm, hands, or fingers. · Bright red blood has soaked through the bandage over your incision. Watch closely for changes in your health, and be sure to contact your doctor if:  · You see fluid leaking from either nipple. · Your swelling gets worse. · Your swelling is not going down. · You do not have a bowel movement after taking a laxative. Where can you learn more? Go to http://cari-aguila.info/. Enter D222 in the search box to learn more about \"Lumpectomy: What to Expect at Home. \"  Current as of: July 26, 2016  Content Version: 11.1  © 2403-2843 [employer]. Care instructions adapted under license by [employer] (which disclaims liability or warranty for this information). If you have questions about a medical condition or this instruction, always ask your healthcare professional. [place] [address] any warranty or liability for your use of this information. After general anesthesia or intravenous sedation, for 24 hours or while taking prescription Narcotics:  · Limit your activities  · Do not drive and operate hazardous machinery  · Do not make important personal or business decisions  · Do  not drink alcoholic beverages  · If you have not urinated within 8 hours after discharge, please contact your surgeon on call. *  Please give a list of your current medications to your Primary Care Provider. *  Please update this list whenever your medications are discontinued, doses are      changed, or new medications (including over-the-counter products) are added. *  Please carry medication information at all times in case of emergency situations.       These are general instructions for a healthy lifestyle:  No smoking/ No tobacco products/ Avoid exposure to second hand smoke  Surgeon General's Warning:  Quitting smoking now greatly reduces serious risk to your health. Obesity, smoking, and sedentary lifestyle greatly increases your risk for illness  A healthy diet, regular physical exercise & weight monitoring are important for maintaining a healthy lifestyle    You may be retaining fluid if you have a history of heart failure or if you experience any of the following symptoms:  Weight gain of 3 pounds or more overnight or 5 pounds in a week, increased swelling in our hands or feet or shortness of breath while lying flat in bed. Please call your doctor as soon as you notice any of these symptoms; do not wait until your next office visit. Recognize signs and symptoms of STROKE:    F-face looks uneven    A-arms unable to move or move unevenly    S-speech slurred or non-existent    T-time-call 911 as soon as signs and symptoms begin-DO NOT go       Back to bed or wait to see if you get better-TIME IS BRAIN.

## 2017-02-17 NOTE — OP NOTES
[facility]   OPERATIVE REPORT       Name:  Kat Serrato   MR#:  [id number]   :  1960   Account #:  [de-identified]   Date of Adm:  2017       DATE OF SURGERY: 2017    PREOPERATIVE DIAGNOSIS: Ductal carcinoma in situ of the right   breast.    POSTOPERATIVE DIAGNOSIS: Ductal carcinoma in situ of the right   breast.    PROCEDURE: Re-excision superior lumpectomy margin of right   breast.    SURGEON: Shania Lebron MD.    ASSISTANT: None. ANESTHESIA: General anesthetic. ESTIMATED BLOOD LOSS: 20 mL. CONDITION AT COMPLETION: Stable. INDICATIONS: This patient is a 54-year-old white female who   underwent a needle localized lumpectomy for atypical ductal   hyperplasia. Unfortunately, the patient was found to have ductal   carcinoma in situ with a positive superior margin. Due to this,   a reexcision of margins was discussed and recommended. The   risks, benefits, and alternatives of that were clearly   explained. She understood and wished to proceed; consent was   given. PROCEDURE: The patient was taken to the operating room where she   was placed on the table in supine position. General endotracheal   anesthetic was performed. The breast was prepped and draped in   sterile fashion. Previous lumpectomy incision site was opened   with a #15 blade and dissection down to the lumpectomy cavity   was performed with electrocautery. The cavity was easily   identified. A small seroma collection in the superior margin of   the lumpectomy was excised, taking at least 1 cm of tissue   superior and medial direction. This specimen was marked for   orientation with a long suture lateral, short suture superior,   and a double suture at the deep margin. Hemostasis was assured. Again, within the cavity, there was no other palpable or visible   disease.  Irrigation with warm saline was performed of the cavity and   it was closed in 2 layers with 3-0 Vicryl and subcuticular 4-0 Monocryl. Steri-Strips and gauze dressings were applied. The patient tolerated the procedure well with no complications. She was awakened, extubated, and taken to recovery in good   condition.         Kenna Gosselin, MD [facility] / [place]   D:  02/17/2017   12:34   T:  02/17/2017   15:28   Job #:  [id number]

## 2017-03-09 ENCOUNTER — PATIENT OUTREACH (OUTPATIENT)
Dept: CASE MANAGEMENT | Age: 57
End: 2017-03-09

## 2017-03-09 NOTE — ACP (ADVANCE CARE PLANNING)
Reason for Referral: Ductal carcinoma in situ (DCIS) of right breast     Referring Provider: Dominga Xavier MD      Primary Care Provider: Mabel Dominguez MD     Family History of Cancer/Hematologic disorders: Family history significant for brother with esophageal cancer.      Presenting Symptoms: Right breast pain and swelling.      Narrative with recent Results/Procedures/Biopsies and Dates completed: Ms. Alicia Kaur is a 64year old female who originally presented to her PCP on 11/15/16 with complaints of a several month history of right breast pain. She did have a recent history of an inflamed cyst in the right parasternal chest area which resolved with antibiotics. Mammogram on 11/22/2016 indicated increasing microcalcifications at the site of right posterior 10:30 palpable concern is indeterminate for malignancy, and biopsy was recommended. Patient underwent stereotactic biopsy of right breast on 11/28/16 and resulting pathology indicated fibrocystic mastopathy having atypical intraductal hyperplasia and microcalcifications. Patient was referred to Dr. Gordon Li and underwent a right breast lumpectomy and right breast localized needle biopsy on 1/6/2017. Subsequent pathology revealed ductal carcinoma in situ, micropapillary type (intermediate grade) with tumor approximately 0.1 cm from marked superior margin and approximately 0.6 cm from marked inferior margin. Histology was ER/[place] positive. MRI of the breast was done on 1/13/17 in order to get a better idea of the extent of disease. The MRI showed no evidence of widespread DCIS and no other findings in either breast of concern. Re-excision of superior lumpectomy margin was recommended, however, Ms. Mich Rajput was also having back problems requiring surgery of the spine which was scheduled for 2-9-17 with Dr. Surjit Mazariegos. Re-excision was put on hold until patient recovered from her spine surgery.  On 2/16/2017, patient was able to undergo re-excision of superior lumpectomy margin of the right breast with an uncomplicated post-op course. She now presents to [facility] for further evaluation and treatment. BILATERAL DIAGNOSTIC DIGITAL MAMMOGRAPHY, RIGHT BREAST SONOGRAPHY 11/22/16  IMPRESSION: Increasing microcalcifications at the site of right posterior 10:30 palpable concern is indeterminate for malignancy, and biopsy is recommended. BI-RADS Assessment Category 4: Suspicious Finding- Biopsy should be considered.     [facility] SURGICAL PATHOLOGY REPORT 11/28/2016  DIAGNOSIS: RIGHT BREAST CALCIFICATIONS AT 10:30 POSITION, CORE BIOPSY: FIBROCYSTIC MASTOPATHY HAVING ATYPICAL INTRADUCTAL HYPERPLASIA AND MICROCALCIFICATIONS. SEE COMMENT. Comment: Because ductal carcinoma in situ is found in a small percentage of excisional biopsies following core biopsies with atypical intraductal hyperplasia, consideration should be given to excisional biopsy if clinically indicated.     [facility] SURGICAL PATHOLOGY REPORT 1/6/2017  DIAGNOSIS: RIGHT BREAST, NEEDLE LOCALIZED WIDE EXCISION: DUCTAL CARCINOMA IN SITU, MICROPAPILLARY TYPE (INTERMEDIATE GRADE). TUMOR IS APPROXIMATELY 0.1 CM FROM MARKED SUPERIOR MARGIN AND APPROXIMATELY 0.6 CM FROM MARKED INFERIOR MARGIN. OTHER MARGINS OF RESECTION ARE GREATER THAN 1 CM FROM TUMOR.     [facility]- ER/AK procedure  Interpretation: [employer] Multiple Marker Panel:      TEST NAME:    RESULTS:    INTERNAL CONTROLS:   ESTROGEN RECEPTOR:   Positive (99.7%)   Present, Positive   PROGESTERONE RECEPTOR:   Positive (35.4%)   Present, Positive     MRI OF THE BREASTS WITH AND WITHOUT CONTRAST 1/13/17  IMPRESSION:   1. Right 10:30 surgically proven DCIS demonstrates a 4.8 cm postoperative fluid collection, but no suspicious mass. Minimal surrounding enhancement is likely reactive. 2. Single right axillary lymph node with borderline cortical thickening but no abnormal enlargement or dysmorphology otherwise. This is nonspecific and more likely reactive than metastatic.  If it would be clinically beneficial an ultrasound-guided biopsy could be attempted. 3. No other suspicious finding otherwise in either breast or the chest wall. Recommend management as directed clinically. BI-RADS Assessment Category 6: Known Biopsy Proven Malignancy     [facility] SURGICAL PATHOLOGY REPORT 2/16/2017  DIAGNOSIS: SUPERIOR LUMPECTOMY MARGIN: BENIGN FIBROADIPOSE BREAST TISSUE; FIBROCYSTIC MASTOPATHY WITH MILD DUCTAL EPITHELIAL HYPERPLASIA OF THE USUAL TYPE; NO RESIDUAL DUCTAL CARCINOMA IN SITU IDENTIFIED; CHANGES CONSISTENT WITH PRIOR EXCISION SITE.     Notes from referring Provider: None     Other pertinent information: None     Presented at Tumor Board: Patient presented on 2/23/17. Copied from new pt abstract note. 3/9/17 saw pt today with Dr. Fidel Mejía for initial medical oncology consult for right breast DCIS. Treatment options discussed including potential side effects - hormone therapy and radiation. Will order rad onc referral.  Pt stated she has not had a period in 2 years. Will check menopause status. Baseline bone density. Provided opportunity to ask questions and all were discussed. My contact information was provided and I encouraged her to call with any questions or concerns. Follow up in 2 months. Navigation will continue to follow.

## 2017-03-10 ENCOUNTER — HOSPITAL ENCOUNTER (OUTPATIENT)
Dept: MAMMOGRAPHY | Age: 57
Discharge: HOME | End: 2017-03-10
Attending: INTERNAL MEDICINE
Payer: OTHER GOVERNMENT

## 2017-03-10 ENCOUNTER — HOSPITAL ENCOUNTER (OUTPATIENT)
Dept: LAB | Age: 57
Discharge: HOME | End: 2017-03-10
Payer: OTHER GOVERNMENT

## 2017-03-10 DIAGNOSIS — Z78.0 POST-MENOPAUSAL: ICD-10-CM

## 2017-03-10 DIAGNOSIS — D05.11 DUCTAL CARCINOMA IN SITU (DCIS) OF RIGHT BREAST: ICD-10-CM

## 2017-03-10 LAB
ALBUMIN SERPL BCP-MCNC: 3.4 G/DL (ref 3.5–5)
ALBUMIN/GLOB SERPL: 1 {RATIO} (ref 1.2–3.5)
ALP SERPL-CCNC: 120 U/L (ref 50–136)
ALT SERPL-CCNC: 26 U/L (ref 12–65)
ANION GAP BLD CALC-SCNC: 8 MMOL/L (ref 7–16)
AST SERPL W P-5'-P-CCNC: 18 U/L (ref 15–37)
BASOPHILS # BLD AUTO: 0.1 K/UL (ref 0–0.2)
BASOPHILS # BLD: 0 % (ref 0–2)
BILIRUB SERPL-MCNC: 0.2 MG/DL (ref 0.2–1.1)
BUN SERPL-MCNC: 9 MG/DL (ref 6–23)
CALCIUM SERPL-MCNC: 8.7 MG/DL (ref 8.3–10.4)
CHLORIDE SERPL-SCNC: 105 MMOL/L (ref 98–107)
CO2 SERPL-SCNC: 27 MMOL/L (ref 23–32)
CREAT SERPL-MCNC: 0.83 MG/DL (ref 0.6–1)
DIFFERENTIAL METHOD BLD: ABNORMAL
EOSINOPHIL # BLD: 0.1 K/UL (ref 0–0.8)
EOSINOPHIL NFR BLD: 1 % (ref 0.5–7.8)
ERYTHROCYTE [DISTWIDTH] IN BLOOD BY AUTOMATED COUNT: 16.9 % (ref 11.9–14.6)
GLOBULIN SER CALC-MCNC: 3.4 G/DL (ref 2.3–3.5)
GLUCOSE SERPL-MCNC: 129 MG/DL (ref 65–100)
HCT VFR BLD AUTO: 35.8 % (ref 35.8–46.3)
HGB BLD-MCNC: 11.3 G/DL (ref 11.7–15.4)
LDH SERPL L TO P-CCNC: 175 U/L (ref 100–190)
LYMPHOCYTES # BLD AUTO: 31 % (ref 13–44)
LYMPHOCYTES # BLD: 4.5 K/UL (ref 0.5–4.6)
MCH RBC QN AUTO: 25 PG (ref 26.1–32.9)
MCHC RBC AUTO-ENTMCNC: 31.6 G/DL (ref 31.4–35)
MCV RBC AUTO: 79.2 FL (ref 79.6–97.8)
MONOCYTES # BLD: 0.9 K/UL (ref 0.1–1.3)
MONOCYTES NFR BLD AUTO: 6 % (ref 4–12)
NEUTS SEG # BLD: 9.2 K/UL (ref 1.7–8.2)
NEUTS SEG NFR BLD AUTO: 62 % (ref 43–78)
NRBC # BLD: 0 K/UL (ref 0–0.2)
PLATELET # BLD AUTO: 378 K/UL (ref 150–450)
PMV BLD AUTO: 8.2 FL (ref 10.8–14.1)
POTASSIUM SERPL-SCNC: 3.5 MMOL/L (ref 3.5–5.1)
PROT SERPL-MCNC: 6.8 G/DL (ref 6.3–8.2)
RBC # BLD AUTO: 4.52 M/UL (ref 4.05–5.25)
SODIUM SERPL-SCNC: 140 MMOL/L (ref 136–145)
WBC # BLD AUTO: 14.8 K/UL (ref 4.3–11.1)

## 2017-03-10 PROCEDURE — 83002 ASSAY OF GONADOTROPIN (LH): CPT | Performed by: INTERNAL MEDICINE

## 2017-03-10 PROCEDURE — 85025 COMPLETE CBC W/AUTO DIFF WBC: CPT | Performed by: INTERNAL MEDICINE

## 2017-03-10 PROCEDURE — 80053 COMPREHEN METABOLIC PANEL: CPT | Performed by: INTERNAL MEDICINE

## 2017-03-10 PROCEDURE — 77080 DXA BONE DENSITY AXIAL: CPT

## 2017-03-10 PROCEDURE — 83001 ASSAY OF GONADOTROPIN (FSH): CPT | Performed by: INTERNAL MEDICINE

## 2017-03-10 PROCEDURE — 82670 ASSAY OF TOTAL ESTRADIOL: CPT | Performed by: INTERNAL MEDICINE

## 2017-03-10 PROCEDURE — 36415 COLL VENOUS BLD VENIPUNCTURE: CPT | Performed by: INTERNAL MEDICINE

## 2017-03-10 PROCEDURE — 83615 LACTATE (LD) (LDH) ENZYME: CPT | Performed by: INTERNAL MEDICINE

## 2017-03-14 LAB
ESTRADIOL SERPL-MCNC: 52.11 PG/ML
FSH SERPL-ACNC: 30.8 MIU/ML
LH SERPL-ACNC: 22.3 MIU/ML

## 2017-03-15 ENCOUNTER — HOSPITAL ENCOUNTER (OUTPATIENT)
Dept: RADIATION ONCOLOGY | Age: 57
Discharge: HOME | End: 2017-03-15
Payer: OTHER GOVERNMENT

## 2017-03-15 ENCOUNTER — HOSPITAL ENCOUNTER (OUTPATIENT)
Dept: SURGERY | Age: 57
Discharge: HOME | End: 2017-03-15
Payer: OTHER GOVERNMENT

## 2017-03-15 VITALS
TEMPERATURE: 98 F | HEART RATE: 110 BPM | RESPIRATION RATE: 17 BRPM | BODY MASS INDEX: 33.75 KG/M2 | WEIGHT: 202.56 LBS | SYSTOLIC BLOOD PRESSURE: 141 MMHG | HEIGHT: 65 IN | OXYGEN SATURATION: 98 % | DIASTOLIC BLOOD PRESSURE: 79 MMHG

## 2017-03-15 VITALS
BODY MASS INDEX: 34 KG/M2 | OXYGEN SATURATION: 98 % | TEMPERATURE: 98.1 F | DIASTOLIC BLOOD PRESSURE: 89 MMHG | SYSTOLIC BLOOD PRESSURE: 141 MMHG | HEART RATE: 117 BPM | WEIGHT: 204.3 LBS

## 2017-03-15 LAB
ANION GAP BLD CALC-SCNC: 10 MMOL/L (ref 7–16)
APPEARANCE UR: NORMAL
BACTERIA SPEC CULT: NORMAL
BASOPHILS # BLD AUTO: 0 K/UL (ref 0–0.2)
BASOPHILS # BLD: 0 % (ref 0–2)
BILIRUB UR QL: NEGATIVE
BUN SERPL-MCNC: 14 MG/DL (ref 6–23)
CALCIUM SERPL-MCNC: 8 MG/DL (ref 8.3–10.4)
CHLORIDE SERPL-SCNC: 107 MMOL/L (ref 98–107)
CO2 SERPL-SCNC: 24 MMOL/L (ref 21–32)
COLOR UR: YELLOW
CREAT SERPL-MCNC: 0.7 MG/DL (ref 0.6–1)
DIFFERENTIAL METHOD BLD: ABNORMAL
EOSINOPHIL # BLD: 0.1 K/UL (ref 0–0.8)
EOSINOPHIL NFR BLD: 1 % (ref 0.5–7.8)
ERYTHROCYTE [DISTWIDTH] IN BLOOD BY AUTOMATED COUNT: 16.9 % (ref 11.9–14.6)
GLUCOSE SERPL-MCNC: 83 MG/DL (ref 65–100)
GLUCOSE UR STRIP.AUTO-MCNC: NEGATIVE MG/DL
HCT VFR BLD AUTO: 36.3 % (ref 35.8–46.3)
HGB BLD-MCNC: 11.5 G/DL (ref 11.7–15.4)
HGB UR QL STRIP: NEGATIVE
IMM GRANULOCYTES # BLD: 0 K/UL (ref 0–0.5)
IMM GRANULOCYTES NFR BLD AUTO: 0.3 % (ref 0–5)
KETONES UR QL STRIP.AUTO: NEGATIVE MG/DL
LEUKOCYTE ESTERASE UR QL STRIP.AUTO: NEGATIVE
LYMPHOCYTES # BLD AUTO: 25 % (ref 13–44)
LYMPHOCYTES # BLD: 3.2 K/UL (ref 0.5–4.6)
MCH RBC QN AUTO: 24.8 PG (ref 26.1–32.9)
MCHC RBC AUTO-ENTMCNC: 31.7 G/DL (ref 31.4–35)
MCV RBC AUTO: 78.4 FL (ref 79.6–97.8)
MONOCYTES # BLD: 0.7 K/UL (ref 0.1–1.3)
MONOCYTES NFR BLD AUTO: 5 % (ref 4–12)
NEUTS SEG # BLD: 9 K/UL (ref 1.7–8.2)
NEUTS SEG NFR BLD AUTO: 69 % (ref 43–78)
NITRITE UR QL STRIP.AUTO: NEGATIVE
PH UR STRIP: 6 [PH] (ref 5–9)
PLATELET # BLD AUTO: 389 K/UL (ref 150–450)
PMV BLD AUTO: 8.4 FL (ref 10.8–14.1)
POTASSIUM SERPL-SCNC: 3.9 MMOL/L (ref 3.5–5.1)
PROT UR STRIP-MCNC: NEGATIVE MG/DL
RBC # BLD AUTO: 4.63 M/UL (ref 4.05–5.25)
SERVICE CMNT-IMP: NORMAL
SODIUM SERPL-SCNC: 141 MMOL/L (ref 136–145)
SP GR UR REFRACTOMETRY: 1.02 (ref 1–1.02)
UROBILINOGEN UR QL STRIP.AUTO: 0.2 EU/DL (ref 0.2–1)
WBC # BLD AUTO: 13 K/UL (ref 4.3–11.1)

## 2017-03-15 PROCEDURE — 80048 BASIC METABOLIC PNL TOTAL CA: CPT | Performed by: NEUROLOGICAL SURGERY

## 2017-03-15 PROCEDURE — 81003 URINALYSIS AUTO W/O SCOPE: CPT | Performed by: NEUROLOGICAL SURGERY

## 2017-03-15 PROCEDURE — 85025 COMPLETE CBC W/AUTO DIFF WBC: CPT | Performed by: NEUROLOGICAL SURGERY

## 2017-03-15 PROCEDURE — 99211 OFF/OP EST MAY X REQ PHY/QHP: CPT

## 2017-03-15 PROCEDURE — 87641 MR-STAPH DNA AMP PROBE: CPT | Performed by: NEUROLOGICAL SURGERY

## 2017-03-15 RX ORDER — TRAZODONE HYDROCHLORIDE 100 MG/1
500 TABLET ORAL
COMMUNITY
End: 2018-04-11 | Stop reason: ALTCHOICE
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
## 2017-03-15 NOTE — PERIOP NOTES
Spoke with Tristan Rivera at Dr. Lloyd Speaks office. Reported all lab results from today. Per Tristan Rivera, ok to route results to office. MRSA/SA nasal swab results are both are negative.

## 2017-03-15 NOTE — PERIOP NOTES
SA target not detected.                                 A MRSA NEGATIVE, SA NEGATIVE test result does not preclude MRSA or SA nasal colonization

## 2017-03-15 NOTE — PERIOP NOTES
Patient verified name, , and surgery as listed in [facility]. TYPE  CASE:3  Labs per surgeon: cbc with diff, bmp, urine, mrsa/mssa:labs pending  Labs per anesthesia protocol: type and screen on the dos  EKG  :  none  MRSA/MSSA:pending  Pt instructed that they will be notified of positive results and will use mupirocin ointment as directed. Patient provided with handouts including guide to surgery , transfusions, pain management and hand hygiene for the family and community. Pt verbalizes understanding of all pre-op instructions . Instructed that family must be present in building at all times. Hibiclens and instructions given per hospital policy. Instructed patient to continue  previous medications as prescribed prior to surgery and  to take the following medications the day of surgery according to anesthesia guidelines : lexapro,vimpat       Continue all previous medications unless otherwise directed. Original medication prescription bottles not visualized during patient appointment. Instructed patient to hold  the following medications prior to surgery: aleve cold and sinus, nsaids      Patient verbalized understanding of all instructions and provided all medical/health information to the best of their ability. Road to Recovery Spine surgery patient guide given. Instructed on incentive spirometry with return demonstration. Long handled prehab sponge given with instructions for use. Patient viewed spine prehab video.

## 2017-03-15 NOTE — PERIOP NOTES
Recent Results (from the past 12 hour(s))   MSSA/MRSA SC BY PCR, NASAL SWAB    Collection Time: 03/15/17 10:50 AM   Result Value Ref Range    Special Requests: NO SPECIAL REQUESTS      Culture result:        SA target not detected. A MRSA NEGATIVE, SA NEGATIVE test result does not preclude MRSA or SA nasal colonization. URINALYSIS W/ RFLX MICROSCOPIC    Collection Time: 03/15/17 10:50 AM   Result Value Ref Range    Color YELLOW      Appearance CLOUDY      Specific gravity 1.023 1.001 - 1.023      pH (UA) 6.0 5.0 - 9.0      Protein NEGATIVE  NEG mg/dL    Glucose NEGATIVE  mg/dL    Ketone NEGATIVE  NEG mg/dL    Bilirubin NEGATIVE  NEG      Blood NEGATIVE  NEG      Urobilinogen 0.2 0.2 - 1.0 EU/dL    Nitrites NEGATIVE  NEG      Leukocyte Esterase NEGATIVE  NEG     CBC WITH AUTOMATED DIFF    Collection Time: 03/15/17 11:25 AM   Result Value Ref Range    WBC 13.0 (H) 4.3 - 11.1 K/uL    RBC 4.63 4.05 - 5.25 M/uL    HGB 11.5 (L) 11.7 - 15.4 g/dL    HCT 36.3 35.8 - 46.3 %    MCV 78.4 (L) 79.6 - 97.8 FL    MCH 24.8 (L) 26.1 - 32.9 PG    MCHC 31.7 31.4 - 35.0 g/dL    RDW 16.9 (H) 11.9 - 14.6 %    PLATELET 756 016 - 600 K/uL    MPV 8.4 (L) 10.8 - 14.1 FL    DF AUTOMATED      NEUTROPHILS 69 43 - 78 %    LYMPHOCYTES 25 13 - 44 %    MONOCYTES 5 4.0 - 12.0 %    EOSINOPHILS 1 0.5 - 7.8 %    BASOPHILS 0 0.0 - 2.0 %    IMMATURE GRANULOCYTES 0.3 0.0 - 5.0 %    ABS. NEUTROPHILS 9.0 (H) 1.7 - 8.2 K/UL    ABS. LYMPHOCYTES 3.2 0.5 - 4.6 K/UL    ABS. MONOCYTES 0.7 0.1 - 1.3 K/UL    ABS. EOSINOPHILS 0.1 0.0 - 0.8 K/UL    ABS. BASOPHILS 0.0 0.0 - 0.2 K/UL    ABS. IMM.  GRANS. 0.0 0.0 - 0.5 K/UL   METABOLIC PANEL, BASIC    Collection Time: 03/15/17 11:25 AM   Result Value Ref Range    Sodium 141 136 - 145 mmol/L    Potassium 3.9 3.5 - 5.1 mmol/L    Chloride 107 98 - 107 mmol/L    CO2 24 21 - 32 mmol/L    Anion gap 10 7 - 16 mmol/L    Glucose 83 65 - 100 mg/dL    BUN 14 6 - 23 MG/DL    Creatinine 0.70 0.6 - 1.0 MG/DL    GFR est AA >60 >60 ml/min/1.73m2    GFR est non-AA >60 >60 ml/min/1.73m2    Calcium 8.0 (L) 8.3 - 10.4 MG/DL

## 2017-03-20 ENCOUNTER — HOSPITAL ENCOUNTER (OUTPATIENT)
Dept: RADIATION ONCOLOGY | Age: 57
Discharge: HOME | End: 2017-03-20
Payer: OTHER GOVERNMENT

## 2017-03-24 ENCOUNTER — APPOINTMENT (OUTPATIENT)
Dept: RADIATION ONCOLOGY | Age: 57
End: 2017-03-24
Payer: OTHER GOVERNMENT

## 2017-03-30 ENCOUNTER — HOSPITAL ENCOUNTER (OUTPATIENT)
Dept: RADIATION ONCOLOGY | Age: 57
Discharge: HOME | End: 2017-03-30
Payer: OTHER GOVERNMENT

## 2017-03-30 PROCEDURE — 77290 THER RAD SIMULAJ FIELD CPLX: CPT

## 2017-03-30 PROCEDURE — 77332 RADIATION TREATMENT AID(S): CPT

## 2017-04-06 ENCOUNTER — HOSPITAL ENCOUNTER (OUTPATIENT)
Dept: RADIATION ONCOLOGY | Age: 57
Discharge: HOME | End: 2017-04-06
Payer: OTHER GOVERNMENT

## 2017-04-06 PROCEDURE — 77300 RADIATION THERAPY DOSE PLAN: CPT

## 2017-04-06 PROCEDURE — 77334 RADIATION TREATMENT AID(S): CPT

## 2017-04-06 PROCEDURE — 77295 3-D RADIOTHERAPY PLAN: CPT

## 2017-04-17 ENCOUNTER — HOSPITAL ENCOUNTER (OUTPATIENT)
Dept: RADIATION ONCOLOGY | Age: 57
Discharge: HOME | End: 2017-04-17
Payer: OTHER GOVERNMENT

## 2017-04-17 PROCEDURE — 77280 THER RAD SIMULAJ FIELD SMPL: CPT

## 2017-04-17 PROCEDURE — 77412 RADIATION TX DELIVERY LVL 3: CPT

## 2017-04-17 NOTE — PROGRESS NOTES
Patient: Tyler Fields MRN: [id number]  SSN: [id number]    YOB: 1960  Age: 64 y.o. Sex: female      DIAGNOSIS: Right breast DCIS, [id number], Stage 0, Intermediate grade. ER 99%, WA 35%.     PREVIOUS TREATMENT:  1) Right lumpecomty 1/6/17  2) Re-Excision 2/16/17. TREATMENT SITE:  Right breast    DOSE and FRACTIONATION:  1/16 fractions, 266 cGy of 4256 cGy planned, 0/5 boost, 0 cGy of 1000 cGy planned. INTERVAL HISTORY:  Tyler Fields is a 64 y.o. female being treated for DCIS. She is doing well without complaints. OBJECTIVE:  No findings. There were no vitals taken for this visit. Lab Results   Component Value Date/Time    Sodium 141 03/15/2017 11:25 AM    Potassium 3.9 03/15/2017 11:25 AM    Chloride 107 03/15/2017 11:25 AM    CO2 24 03/15/2017 11:25 AM    Anion gap 10 03/15/2017 11:25 AM    Glucose 83 03/15/2017 11:25 AM    BUN 14 03/15/2017 11:25 AM    Creatinine 0.70 03/15/2017 11:25 AM    GFR est AA >60 03/15/2017 11:25 AM    GFR est non-AA >60 03/15/2017 11:25 AM    Calcium 8.0 03/15/2017 11:25 AM    Albumin 3.4 03/10/2017 03:54 PM    Protein, total 6.8 03/10/2017 03:54 PM    Globulin 3.4 03/10/2017 03:54 PM    A-G Ratio 1.0 03/10/2017 03:54 PM    AST (SGOT) 18 03/10/2017 03:54 PM    ALT (SGPT) 26 03/10/2017 03:54 PM     Lab Results   Component Value Date/Time    WBC 13.0 03/15/2017 11:25 AM    HGB 11.5 03/15/2017 11:25 AM    HCT 36.3 03/15/2017 11:25 AM    PLATELET 214 99/20/4046 11:25 AM       ASSESSMENT and PLAN:  Tyler Fields is tolerating radiation as anticipated for the current dose and fraction. We will continue on as planned with another treatment visit anticipated next week.         Mc Laws MD   April 17, 2017

## 2017-04-18 ENCOUNTER — HOSPITAL ENCOUNTER (OUTPATIENT)
Dept: RADIATION ONCOLOGY | Age: 57
Discharge: HOME | End: 2017-04-18
Payer: OTHER GOVERNMENT

## 2017-04-18 PROCEDURE — 77331 SPECIAL RADIATION DOSIMETRY: CPT

## 2017-04-18 PROCEDURE — 77412 RADIATION TX DELIVERY LVL 3: CPT

## 2017-04-19 ENCOUNTER — HOSPITAL ENCOUNTER (OUTPATIENT)
Dept: RADIATION ONCOLOGY | Age: 57
Discharge: HOME | End: 2017-04-19
Payer: OTHER GOVERNMENT

## 2017-04-19 PROCEDURE — 77412 RADIATION TX DELIVERY LVL 3: CPT

## 2017-04-20 ENCOUNTER — HOSPITAL ENCOUNTER (OUTPATIENT)
Dept: RADIATION ONCOLOGY | Age: 57
Discharge: HOME | End: 2017-04-20
Payer: OTHER GOVERNMENT

## 2017-04-20 PROCEDURE — 77412 RADIATION TX DELIVERY LVL 3: CPT

## 2017-04-21 ENCOUNTER — HOSPITAL ENCOUNTER (OUTPATIENT)
Dept: RADIATION ONCOLOGY | Age: 57
Discharge: HOME | End: 2017-04-21
Payer: OTHER GOVERNMENT

## 2017-04-21 PROCEDURE — 77412 RADIATION TX DELIVERY LVL 3: CPT

## 2017-04-21 PROCEDURE — 77336 RADIATION PHYSICS CONSULT: CPT

## 2017-04-24 ENCOUNTER — HOSPITAL ENCOUNTER (OUTPATIENT)
Dept: RADIATION ONCOLOGY | Age: 57
Discharge: HOME | End: 2017-04-24
Payer: OTHER GOVERNMENT

## 2017-04-24 PROCEDURE — 77417 THER RADIOLOGY PORT IMAGE(S): CPT

## 2017-04-24 PROCEDURE — 77412 RADIATION TX DELIVERY LVL 3: CPT

## 2017-04-24 NOTE — PROGRESS NOTES
Patient: Meseret Casey MRN: [id number]  SSN: [id number]    YOB: 1960  Age: 64 y.o. Sex: female      DIAGNOSIS: Right breast DCIS, [id number], Stage 0, Intermediate grade. ER 99%, MA 35%.     PREVIOUS TREATMENT:  1) Right lumpecomty 1/6/17  2) Re-Excision 2/16/17. TREATMENT SITE:  Right breast    DOSE and FRACTIONATION:  6/16 fractions, 1596 cGy of 4256 cGy planned, 0/5 boost, 0 cGy of 1000 cGy planned. INTERVAL HISTORY:  Meseret Casey is a 64 y.o. female being treated for DCIS. She is asking to be seen today with complaint of right mid sternal/right chest wall pain along with dyspnea. -states the pain began late last week. She describes the pain as tightness, painful to touch - lasting several hours at times. She denies dysphagia, but states that she feels her food is a little more difficult to swallow at times. She has tried aleve with little to no relief. She tried tramadol over the weekend with good relief. She has a history of COPD but states the dyspnea has worsened over these past several days with exertion. She has multiple co-morbities, on multiple medications. At this time, she is doing ok. OBJECTIVE:  No findings. There were no vitals taken for this visit. Female who appears fatigued in no acute distress. Lungs with rhonci and scattered wheezing which she states is \"normal\" for her. She is Tachy (110), in NSR - again states she has had a fast heart rate for years. LABS: none today      ASSESSMENT and PLAN:  Meseret Casey is tolerating radiation as anticipated for the current dose and fraction. Appears to be having inflammatory discomfort, likely from radiation. She responded well to tramadol over the weekend. I will prescribe her tramadol #30. We also discussed trying NSAIDs for pain relief. She understand to call our office for worsening pain. We will continue on as planned with another treatment visit anticipated next week.         Day Maciel NP   April 24, 2017       Portions of this note were copied from prior encounters and reviewed for accuracy, currency, and represent documentation and tasks completed during this encounter. I verify and attest these portions to be unchanged from prior visits.

## 2017-04-25 ENCOUNTER — HOSPITAL ENCOUNTER (OUTPATIENT)
Dept: RADIATION ONCOLOGY | Age: 57
Discharge: HOME | End: 2017-04-25
Payer: OTHER GOVERNMENT

## 2017-04-25 PROCEDURE — 77412 RADIATION TX DELIVERY LVL 3: CPT

## 2017-04-26 ENCOUNTER — HOSPITAL ENCOUNTER (OUTPATIENT)
Dept: RADIATION ONCOLOGY | Age: 57
Discharge: HOME | End: 2017-04-26
Payer: OTHER GOVERNMENT

## 2017-04-26 PROCEDURE — 77412 RADIATION TX DELIVERY LVL 3: CPT

## 2017-04-27 ENCOUNTER — HOSPITAL ENCOUNTER (OUTPATIENT)
Dept: RADIATION ONCOLOGY | Age: 57
Discharge: HOME | End: 2017-04-27
Payer: OTHER GOVERNMENT

## 2017-04-27 PROCEDURE — 77331 SPECIAL RADIATION DOSIMETRY: CPT

## 2017-04-27 PROCEDURE — 77412 RADIATION TX DELIVERY LVL 3: CPT

## 2017-04-27 PROCEDURE — 77334 RADIATION TREATMENT AID(S): CPT

## 2017-04-27 PROCEDURE — 77290 THER RAD SIMULAJ FIELD CPLX: CPT

## 2017-04-27 PROCEDURE — 77321 SPECIAL TELETX PORT PLAN: CPT

## 2017-04-28 ENCOUNTER — HOSPITAL ENCOUNTER (OUTPATIENT)
Dept: RADIATION ONCOLOGY | Age: 57
Discharge: HOME | End: 2017-04-28
Payer: OTHER GOVERNMENT

## 2017-04-28 PROCEDURE — 77412 RADIATION TX DELIVERY LVL 3: CPT

## 2017-05-01 ENCOUNTER — HOSPITAL ENCOUNTER (OUTPATIENT)
Dept: RADIATION ONCOLOGY | Age: 57
Discharge: HOME | End: 2017-05-01
Payer: OTHER GOVERNMENT

## 2017-05-01 PROCEDURE — 77336 RADIATION PHYSICS CONSULT: CPT

## 2017-05-01 PROCEDURE — 77417 THER RADIOLOGY PORT IMAGE(S): CPT

## 2017-05-01 PROCEDURE — 77412 RADIATION TX DELIVERY LVL 3: CPT

## 2017-05-01 RX ORDER — TRAMADOL HYDROCHLORIDE 50 MG/1
50 TABLET ORAL
Qty: 90 TAB | Refills: 1 | Status: SHIPPED | OUTPATIENT
Start: 2017-05-01 | End: 2019-01-16

## 2017-05-01 NOTE — PROGRESS NOTES
Patient: Hakeem Whitlock MRN: [id number]  SSN: [id number]    YOB: 1960  Age: 64 y.o. Sex: female      DIAGNOSIS: Right breast DCIS, [id number], Stage 0, Intermediate grade. ER 99%, UT 35%.     PREVIOUS TREATMENT:  1) Right lumpecomty 1/6/17  2) Re-Excision 2/16/17. TREATMENT SITE:  Right breast    DOSE and FRACTIONATION:  11/16 fractions, 2926 cGy of 4256 cGy planned, 0/5 boost, 0 cGy of 1000 cGy planned. INTERVAL HISTORY:  Hakeem Whitlock is a 64 y.o. female being treated for DCIS. She is doing well without complaints week 1 but having depresive symptoms week 3. OBJECTIVE:  No findings. There were no vitals taken for this visit. Lab Results   Component Value Date/Time    Sodium 141 03/15/2017 11:25 AM    Potassium 3.9 03/15/2017 11:25 AM    Chloride 107 03/15/2017 11:25 AM    CO2 24 03/15/2017 11:25 AM    Anion gap 10 03/15/2017 11:25 AM    Glucose 83 03/15/2017 11:25 AM    BUN 14 03/15/2017 11:25 AM    Creatinine 0.70 03/15/2017 11:25 AM    GFR est AA >60 03/15/2017 11:25 AM    GFR est non-AA >60 03/15/2017 11:25 AM    Calcium 8.0 03/15/2017 11:25 AM    Albumin 3.4 03/10/2017 03:54 PM    Protein, total 6.8 03/10/2017 03:54 PM    Globulin 3.4 03/10/2017 03:54 PM    A-G Ratio 1.0 03/10/2017 03:54 PM    AST (SGOT) 18 03/10/2017 03:54 PM    ALT (SGPT) 26 03/10/2017 03:54 PM     Lab Results   Component Value Date/Time    WBC 13.0 03/15/2017 11:25 AM    HGB 11.5 03/15/2017 11:25 AM    HCT 36.3 03/15/2017 11:25 AM    PLATELET 538 80/76/6859 11:25 AM       ASSESSMENT and PLAN:  Hakeem Whitlock is tolerating radiation as anticipated for the current dose and fraction. Seeing therapist and changing depression meds. We will continue on as planned with another treatment visit anticipated next week.         Alexander Izquierdo MD   May 1, 2017

## 2017-05-02 ENCOUNTER — HOSPITAL ENCOUNTER (OUTPATIENT)
Dept: RADIATION ONCOLOGY | Age: 57
Discharge: HOME | End: 2017-05-02
Payer: OTHER GOVERNMENT

## 2017-05-02 PROCEDURE — 77412 RADIATION TX DELIVERY LVL 3: CPT

## 2017-05-03 ENCOUNTER — HOSPITAL ENCOUNTER (OUTPATIENT)
Dept: RADIATION ONCOLOGY | Age: 57
Discharge: HOME | End: 2017-05-03
Payer: OTHER GOVERNMENT

## 2017-05-03 PROCEDURE — 77412 RADIATION TX DELIVERY LVL 3: CPT

## 2017-05-04 ENCOUNTER — HOSPITAL ENCOUNTER (OUTPATIENT)
Dept: RADIATION ONCOLOGY | Age: 57
Discharge: HOME | End: 2017-05-04
Payer: OTHER GOVERNMENT

## 2017-05-04 PROCEDURE — 77412 RADIATION TX DELIVERY LVL 3: CPT

## 2017-05-05 ENCOUNTER — HOSPITAL ENCOUNTER (OUTPATIENT)
Dept: RADIATION ONCOLOGY | Age: 57
Discharge: HOME | End: 2017-05-05
Payer: OTHER GOVERNMENT

## 2017-05-05 PROCEDURE — 77412 RADIATION TX DELIVERY LVL 3: CPT

## 2017-05-08 ENCOUNTER — HOSPITAL ENCOUNTER (OUTPATIENT)
Dept: RADIATION ONCOLOGY | Age: 57
Discharge: HOME | End: 2017-05-08
Payer: OTHER GOVERNMENT

## 2017-05-08 PROCEDURE — 77412 RADIATION TX DELIVERY LVL 3: CPT

## 2017-05-08 PROCEDURE — 77280 THER RAD SIMULAJ FIELD SMPL: CPT

## 2017-05-08 PROCEDURE — 77336 RADIATION PHYSICS CONSULT: CPT

## 2017-05-08 NOTE — PROGRESS NOTES
Patient: Trina Doanldson MRN: [id number]  SSN: [id number]    YOB: 1960  Age: 64 y.o. Sex: female      DIAGNOSIS: Right breast DCIS, [id number], Stage 0, Intermediate grade. ER 99%, AR 35%.     PREVIOUS TREATMENT:  1) Right lumpecomty 1/6/17  2) Re-Excision 2/16/17. TREATMENT SITE:  Right breast    DOSE and FRACTIONATION:  16/16 fractions, 4256 cGy of 4256 cGy planned, 0/5 boost, 0 cGy of 1000 cGy planned. INTERVAL HISTORY:  Trina Donaldson is a 64 y.o. female being treated for DCIS. She is doing well without complaints week 1 but having depresive symptoms week 3 and again through week 4. OBJECTIVE:  Mild redness beginning week 3. There were no vitals taken for this visit. Lab Results   Component Value Date/Time    Sodium 141 03/15/2017 11:25 AM    Potassium 3.9 03/15/2017 11:25 AM    Chloride 107 03/15/2017 11:25 AM    CO2 24 03/15/2017 11:25 AM    Anion gap 10 03/15/2017 11:25 AM    Glucose 83 03/15/2017 11:25 AM    BUN 14 03/15/2017 11:25 AM    Creatinine 0.70 03/15/2017 11:25 AM    GFR est AA >60 03/15/2017 11:25 AM    GFR est non-AA >60 03/15/2017 11:25 AM    Calcium 8.0 03/15/2017 11:25 AM    Albumin 3.4 03/10/2017 03:54 PM    Protein, total 6.8 03/10/2017 03:54 PM    Globulin 3.4 03/10/2017 03:54 PM    A-G Ratio 1.0 03/10/2017 03:54 PM    AST (SGOT) 18 03/10/2017 03:54 PM    ALT (SGPT) 26 03/10/2017 03:54 PM     Lab Results   Component Value Date/Time    WBC 13.0 03/15/2017 11:25 AM    HGB 11.5 03/15/2017 11:25 AM    HCT 36.3 03/15/2017 11:25 AM    PLATELET 167 21/23/1630 11:25 AM       ASSESSMENT and PLAN:  Trina Donaldson is tolerating radiation as anticipated for the current dose and fraction. Seeing therapist and changing depression meds. We will continue on as planned with another treatment visit anticipated next week.         Meaghan Felix MD   May 8, 2017

## 2017-05-09 ENCOUNTER — HOSPITAL ENCOUNTER (OUTPATIENT)
Dept: RADIATION ONCOLOGY | Age: 57
Discharge: HOME | End: 2017-05-09
Payer: OTHER GOVERNMENT

## 2017-05-09 PROCEDURE — 77412 RADIATION TX DELIVERY LVL 3: CPT

## 2017-05-10 ENCOUNTER — HOSPITAL ENCOUNTER (OUTPATIENT)
Dept: RADIATION ONCOLOGY | Age: 57
Discharge: HOME | End: 2017-05-10
Payer: OTHER GOVERNMENT

## 2017-05-10 PROCEDURE — 77412 RADIATION TX DELIVERY LVL 3: CPT

## 2017-05-11 ENCOUNTER — PATIENT OUTREACH (OUTPATIENT)
Dept: CASE MANAGEMENT | Age: 57
End: 2017-05-11

## 2017-05-11 ENCOUNTER — HOSPITAL ENCOUNTER (OUTPATIENT)
Dept: LAB | Age: 57
Discharge: HOME | End: 2017-05-11
Payer: OTHER GOVERNMENT

## 2017-05-11 ENCOUNTER — HOSPITAL ENCOUNTER (OUTPATIENT)
Dept: RADIATION ONCOLOGY | Age: 57
Discharge: HOME | End: 2017-05-11
Payer: OTHER GOVERNMENT

## 2017-05-11 DIAGNOSIS — D05.11 DUCTAL CARCINOMA IN SITU (DCIS) OF RIGHT BREAST: ICD-10-CM

## 2017-05-11 LAB
ALBUMIN SERPL BCP-MCNC: 3.7 G/DL (ref 3.5–5)
ALBUMIN/GLOB SERPL: 1.1 {RATIO} (ref 1.2–3.5)
ALP SERPL-CCNC: 121 U/L (ref 50–136)
ALT SERPL-CCNC: 29 U/L (ref 12–65)
ANION GAP BLD CALC-SCNC: 9 MMOL/L (ref 7–16)
AST SERPL W P-5'-P-CCNC: 20 U/L (ref 15–37)
BASOPHILS # BLD AUTO: 0.1 K/UL (ref 0–0.2)
BASOPHILS # BLD: 0 % (ref 0–2)
BILIRUB SERPL-MCNC: 0.3 MG/DL (ref 0.2–1.1)
BUN SERPL-MCNC: 14 MG/DL (ref 6–23)
CALCIUM SERPL-MCNC: 8.6 MG/DL (ref 8.3–10.4)
CHLORIDE SERPL-SCNC: 104 MMOL/L (ref 98–107)
CHOLEST SERPL-MCNC: 260 MG/DL
CO2 SERPL-SCNC: 27 MMOL/L (ref 21–32)
CREAT SERPL-MCNC: 0.83 MG/DL (ref 0.6–1)
DIFFERENTIAL METHOD BLD: ABNORMAL
EOSINOPHIL # BLD: 0.1 K/UL (ref 0–0.8)
EOSINOPHIL NFR BLD: 0 % (ref 0.5–7.8)
ERYTHROCYTE [DISTWIDTH] IN BLOOD BY AUTOMATED COUNT: 16.8 % (ref 11.9–14.6)
GLOBULIN SER CALC-MCNC: 3.4 G/DL (ref 2.3–3.5)
GLUCOSE SERPL-MCNC: 90 MG/DL (ref 65–100)
HCT VFR BLD AUTO: 41.5 % (ref 35.8–46.3)
HDLC SERPL-MCNC: 42 MG/DL (ref 40–60)
HDLC SERPL: 6.2 {RATIO}
HGB BLD-MCNC: 13.2 G/DL (ref 11.7–15.4)
LDLC SERPL CALC-MCNC: 161.2 MG/DL
LIPID PROFILE,FLP: ABNORMAL
LYMPHOCYTES # BLD AUTO: 13 % (ref 13–44)
LYMPHOCYTES # BLD: 2.1 K/UL (ref 0.5–4.6)
MCH RBC QN AUTO: 25 PG (ref 26.1–32.9)
MCHC RBC AUTO-ENTMCNC: 31.8 G/DL (ref 31.4–35)
MCV RBC AUTO: 78.7 FL (ref 79.6–97.8)
MONOCYTES # BLD: 0.9 K/UL (ref 0.1–1.3)
MONOCYTES NFR BLD AUTO: 6 % (ref 4–12)
NEUTS SEG # BLD: 12.6 K/UL (ref 1.7–8.2)
NEUTS SEG NFR BLD AUTO: 80 % (ref 43–78)
NRBC # BLD: 0 K/UL (ref 0–0.2)
PLATELET # BLD AUTO: 339 K/UL (ref 150–450)
PMV BLD AUTO: 8.1 FL (ref 10.8–14.1)
POTASSIUM SERPL-SCNC: 4.2 MMOL/L (ref 3.5–5.1)
PROT SERPL-MCNC: 7.1 G/DL (ref 6.3–8.2)
RBC # BLD AUTO: 5.27 M/UL (ref 4.05–5.25)
SODIUM SERPL-SCNC: 140 MMOL/L (ref 136–145)
TRIGL SERPL-MCNC: 284 MG/DL (ref 35–150)
VLDLC SERPL CALC-MCNC: 56.8 MG/DL (ref 6–23)
WBC # BLD AUTO: 15.7 K/UL (ref 4.3–11.1)

## 2017-05-11 PROCEDURE — 36415 COLL VENOUS BLD VENIPUNCTURE: CPT | Performed by: INTERNAL MEDICINE

## 2017-05-11 PROCEDURE — 85025 COMPLETE CBC W/AUTO DIFF WBC: CPT | Performed by: INTERNAL MEDICINE

## 2017-05-11 PROCEDURE — 80061 LIPID PANEL: CPT | Performed by: INTERNAL MEDICINE

## 2017-05-11 PROCEDURE — 80053 COMPREHEN METABOLIC PANEL: CPT | Performed by: INTERNAL MEDICINE

## 2017-05-11 PROCEDURE — 77412 RADIATION TX DELIVERY LVL 3: CPT

## 2017-05-11 NOTE — PROGRESS NOTES
5/11/17 saw pt today with Dr. Roberts Done for follow up breast cancer. On arimidex, tolerating well. Radiation will be completed on 5/15. She is tolerating treatment well. PO intake is good. She is having some issues with chronic back pain. Being followed by Dr. Raquel Gale, surgery will be planned soon. Survivorship care plan discussed, will be mailed to pt next week. Follow up in 6 months with fasting labs and mammogram.  Navigation will sign off.

## 2017-05-12 ENCOUNTER — HOSPITAL ENCOUNTER (OUTPATIENT)
Dept: RADIATION ONCOLOGY | Age: 57
Discharge: HOME | End: 2017-05-12
Payer: OTHER GOVERNMENT

## 2017-05-12 PROCEDURE — 77412 RADIATION TX DELIVERY LVL 3: CPT

## 2017-05-15 ENCOUNTER — HOSPITAL ENCOUNTER (OUTPATIENT)
Dept: RADIATION ONCOLOGY | Age: 57
Discharge: HOME | End: 2017-05-15
Payer: OTHER GOVERNMENT

## 2017-05-15 PROCEDURE — 77336 RADIATION PHYSICS CONSULT: CPT

## 2017-05-15 PROCEDURE — 77412 RADIATION TX DELIVERY LVL 3: CPT

## 2017-05-15 NOTE — PROGRESS NOTES
Patient: Sandra Dillard MRN: [id number]  SSN: [id number]    YOB: 1960  Age: 64 y.o. Sex: female      DIAGNOSIS: Right breast DCIS, [id number], Stage 0, Intermediate grade. ER 99%, PA 35%.     PREVIOUS TREATMENT:  1) Right lumpecomty 1/6/17  2) Re-Excision 2/16/17. TREATMENT SITE:  Right breast    DOSE and FRACTIONATION:  16/16 fractions, 4256 cGy of 4256 cGy planned, 5/5 boost, 1000 cGy of 1000 cGy planned. INTERVAL HISTORY:  Sandra Dillard is a 64 y.o. female being treated for DCIS. She is doing well without complaints week 1 but having depresive symptoms week 3 and again through week 4. Kin irritation in the axilla final treatment day. OBJECTIVE:  Mild redness beginning week 3 and final day with near desquamation in the axilla final day. There were no vitals taken for this visit. Lab Results   Component Value Date/Time    Sodium 140 05/11/2017 08:15 AM    Potassium 4.2 05/11/2017 08:15 AM    Chloride 104 05/11/2017 08:15 AM    CO2 27 05/11/2017 08:15 AM    Anion gap 9 05/11/2017 08:15 AM    Glucose 90 05/11/2017 08:15 AM    BUN 14 05/11/2017 08:15 AM    Creatinine 0.83 05/11/2017 08:15 AM    GFR est AA >60 05/11/2017 08:15 AM    GFR est non-AA >60 05/11/2017 08:15 AM    Calcium 8.6 05/11/2017 08:15 AM    Albumin 3.7 05/11/2017 08:15 AM    Protein, total 7.1 05/11/2017 08:15 AM    Globulin 3.4 05/11/2017 08:15 AM    A-G Ratio 1.1 05/11/2017 08:15 AM    AST (SGOT) 20 05/11/2017 08:15 AM    ALT (SGPT) 29 05/11/2017 08:15 AM     Lab Results   Component Value Date/Time    WBC 15.7 05/11/2017 08:15 AM    HGB 13.2 05/11/2017 08:15 AM    HCT 41.5 05/11/2017 08:15 AM    PLATELET 166 18/04/3656 08:15 AM       ASSESSMENT and PLAN:  Sandra Dillard is tolerating radiation as anticipated for the current dose and fraction having completed today. Seeing therapist and changing depression meds. Will follow up in 1 month.      Nishi Barnard MD   May 15, 2017

## 2017-06-14 ENCOUNTER — HOSPITAL ENCOUNTER (OUTPATIENT)
Dept: RADIATION ONCOLOGY | Age: 57
Discharge: HOME | End: 2017-06-14
Payer: OTHER GOVERNMENT

## 2017-06-14 VITALS
SYSTOLIC BLOOD PRESSURE: 133 MMHG | WEIGHT: 197.9 LBS | TEMPERATURE: 98.4 F | OXYGEN SATURATION: 97 % | BODY MASS INDEX: 32.93 KG/M2 | HEART RATE: 102 BPM | DIASTOLIC BLOOD PRESSURE: 91 MMHG

## 2017-06-14 PROCEDURE — 99211 OFF/OP EST MAY X REQ PHY/QHP: CPT

## 2017-06-14 RX ORDER — LYSINE HCL 500 MG
1 TABLET ORAL DAILY
COMMUNITY
End: 2019-03-12 | Stop reason: CLARIF

## 2017-06-14 NOTE — PROGRESS NOTES
Pt here for one month f/u for right breast DCIS. RT end 5-15-17. F/u Dr. Angeline Wasserman 11-9-17. Mammogram 11-2-17. Bone Density 5/17-Osteopenia. Taking Calcium and vitamin D. C/o chronic back pain. To schedule 5th back surgery. States has had 5 seizures in 2 months.   Dr. Natalie Vogt is referring to other specialist.    Chilango Griffin RN

## 2017-06-14 NOTE — CONSULTS
Patient: Vernel Soulier MRN: [id number]  SSN: [id number]    YOB: 1960  Age: 64 y.o. Sex: female      Other Providers:  Jez Zamudio MD, Tramaine Land MD    CHIEF COMPLAINT: Breast cancer    DIAGNOSIS: Right breast DCIS, [id number], Stage 0, Intermediate grade. ER 99%, IA 35%. PREVIOUS TREATMENT:  1) Right lumpecomty 1/6/17  2) Re-Excision 2/16/17  3) Radiation treatment of the right breast with 16 fractions of 4256 cGy planned, 5 boost of 1000 cGy planned. Treatment dates: 04/17/17 through 05/15/17    HISTORY OF PRESENT ILLNESS:  Vernel Soulier is a 64 y.o. female seen by Dr. Marylen Lamp 03/15/17 at the request of Dr. Lucía Herrera. She has a pertinent history of multiple back surgeries, a seizure disorder related to a distant trauma, depression (on lexapro), dyslipidemia, osteopenia, COPD (not requiring O2), and bilateral carpal tunnel syndrome. She completed routine screening mammogram followed by bilateral diagnostic mammograms and ultrasound on 11/22/16 which revealed right posterior microcalcifications at 10:30 position for which biopsy was recommended. Needle biopsy on 11/18/16 showed fibrocystic mastopathy having atypical IDH and microcalcifications. She underwent right lumpectomy on 1/6/17 with final pathology showing 1 cm x 0.4 cm intermediate grade DCIS with superior margin being close at 0.1 cm. ER was 99%, IA 35%. Re-excision on 2/16/17 did not show any residual disease. She has no family history of breast cancer. She met with Dr. Lucía Herrera 3/9/17 who discussed anti-estrogen therapy with her and referred her to see us in radiation. Of note, on 3/23/17, Dr. David Martínez has her scheduled for revision of spinal hardware. INTERVAL HISTORY: Mrs Patrizia Lewis returns today for follow up. She is one month following completion of radiation therapy for her right breast DCIS. Skin desquamation of the breast continues to heal. She has tanning and peeling of the skin, but overall much improved.  She denies pain. She has full ROM of her extremity. She has a chronic cough, mostly in mornings that is unchanged. She was started on Arimidex after radiation under the management of Dr. Anh Burk and is tolerating well. She has a history of seizures that have worsened over theses past 1-2 months - followed by neurology      OBSTETRIC HISTORY:    Menarche at the age of 15.    G4,P3. Oral Contraceptive Pills:  As a teenager  Hormone Replacement Therapy:  None  Menopause [age]. PAST MEDICAL HISTORY:    Past Medical History:   Diagnosis Date    Anxiety     daily meds    Arthritis     Atypical ductal hyperplasia of right breast 12/12/2016    Cancer [facility])     right breast    Carpal tunnel syndrome     right     Chronic obstructive pulmonary disease (HCC)     does not have inhalers at this time     Chronic pain     Claustrophobia     DDD (degenerative disc disease), lumbar     pt denies    Ductal carcinoma in situ (DCIS) of right breast 1/17/2017    Seeing Dr. Rox Rodriguez.  History of cigar smoking     History of peptic ulcer     years ago    History of URI (upper respiratory infection)     History of vitamin D deficiency     Insomnia     Lateral epicondylitis  of elbow     Lumbago     Lumbar radiculopathy     Lumbar stenosis with neurogenic claudication 6/11/2015    Memory difficulty     Mixed hyperlipidemia     Myopathy     Neuroma of foot     right     Nicotine vapor product user     Obesity (BMI 30.0-34.9)     33.7    Other hyperalimentation     Papule     Poor historian     Pure hypercholesterolemia     diet controlled     Recurrent major depressive disorder, in full remission ([facility] [address] 04/27/2016    Sees Dr. Melo Gonzalez. H/o cutting. Therapist Marie Linn.      Seizure disorder ([facility] [address]     last seizure -last grand mal few years ago -from blow to head in the past; managed with medication; followed by Dr Benjamin Mukherjee (neuro)    Smoker     40 yr hx  1 1/2 pk per day    Tobacco abuse        The patient denies history of collagen vascular diseases, pacemaker insertion, prior radiation or prior chemotherapy. PAST SURGICAL HISTORY:   Past Surgical History:   Procedure Laterality Date    HX BACK SURGERY  6/15 latest    x4: 2 ruptured one was sciatic nerve; sees Dr. Ahsan Ibarra  11/2016    dr Rai Daley frequency ablation    HX BREAST BIOPSY Bilateral     benign    HX BREAST BIOPSY Right 1/6/2017    RIGHT BREAST NEEDLE LOCALIZED BIOPSY/ ARRIVE @ 1000 TO PREOP/ BREAST CENTER @ 1130 FOR RIGHT MAMMO NEEDLE LOC performed by Agustin Solano MD at [address] HX BREAST LUMPECTOMY Right 1/6/2017    RIGHT BREAST LUMPECTOMY performed by Agustin Solano MD at [address] HX BREAST LUMPECTOMY Right 2/16/2017    EXCISION OF SUPERIOR LUMPECTOMY MARGIN/ RIGHT BREAST performed by Agustin Solano MD at [facility] MAIN OR    HX BUNIONECTOMY Left     as well as other procedures- bone spur    HX CARPAL TUNNEL RELEASE Bilateral     HX COLONOSCOPY  04/2010    HX CYST REMOVAL Right     breast     HX HEENT      cyst and polyps from vocal cords    HX TUBAL LIGATION      [place] BIOPSY BREAST STEREOTACTIC Right 11/28/2016       MEDICATIONS:     Current Outpatient Prescriptions:     Calcium Carbonate-Vit D3-Min 600 mg calcium- 400 unit tab, Take 1 Tab by mouth daily. , Disp: , Rfl:     levETIRAcetam (KEPPRA) 500 mg tablet, Take 500 mg by mouth two (2) times a day., Disp: , Rfl:     anastrozole (ARIMIDEX) 1 mg tablet, Take 1 Tab by mouth daily. , Disp: 90 Tab, Rfl: 3    traMADol (ULTRAM) 50 mg tablet, Take 1 Tab by mouth every six (6) hours as needed for Pain. Max Daily Amount: 200 mg., Disp: 90 Tab, Rfl: 1    traZODone (DESYREL) 100 mg tablet, Take 400 mg by mouth nightly. Indications: major depressive disorder, Disp: , Rfl:     escitalopram oxalate (LEXAPRO) 20 mg tablet, Take 30 mg by mouth every morning.  30mg in am  Indications: take on the dos, Disp: , Rfl:     naproxen na-pseudoephedrine (ALEVE-D SINUS AND COLD) 220-120 mg Tb12, Take  by mouth daily as needed. Last taken 2/14/17  Indications: hold until after surgery, Disp: , Rfl:     lacosamide (VIMPAT) 200 mg tab tablet, Take 200 mg by mouth two (2) times a day. Indications: take on the dos, Disp: , Rfl:     AMBIEN CR 12.5 mg TbMP, Take 12.5 mg by mouth nightly., Disp: , Rfl:     ALLERGIES:   Allergies   Allergen Reactions    Levaquin [Levofloxacin] Rash    Lyrica [Pregabalin] Swelling    Sulfa (Sulfonamide Antibiotics) Itching and Nausea Only       SOCIAL HISTORY:   Social History     Social History    Marital status:      Spouse name: N/A    Number of children: N/A    Years of education: N/A     Occupational History    Not on file. Social History Main Topics    Smoking status: Current Every Day Smoker     Packs/day: 1.50     Years: 40.00    Smokeless tobacco: Current User      Comment: tobacco and e-cigarette    Alcohol use No    Drug use: No    Sexual activity: Not on file     Other Topics Concern    Not on file     Social History Narrative       FAMILY HISTORY:   Family History   Problem Relation Age of Onset   Chika Sloop Cancer Brother      Esophageal    Hypertension Mother     Heart Disease Father     Pacemaker Father     Hypertension Father     Alcohol abuse Father     Lung Disease Father      copd    Diabetes Brother     Stroke Brother        REVIEW OF SYSTEMS: Please see the completed review of systems sheet in the chart that I have reviewed today. PHYSICAL EXAMINATION:   ECOG Performance status 1  VITAL SIGNS:   Visit Vitals    BP (!) 133/91 (BP 1 Location: Left arm, BP Patient Position: Sitting)    Pulse (!) 102    Temp 98.4 °F (36.9 °C)    Wt 197 lb 14.4 oz (89.8 kg)    SpO2 97%    BMI 32.93 kg/m2        GENERAL: The patient is well-developed, ambulatory, alert and in no acute distress. BREASTS: The lumpectomy cavity appears well healed with good aesthetics and symmetry.   Well healed surgical incision in right lateral breast .  Tanning and peeling of the skin - without erythema        PATHOLOGY:    1/6/17:   DIAGNOSIS   RIGHT BREAST, NEEDLE LOCALIZED WIDE EXCISION: DUCTAL CARCINOMA IN SITU, MICROPAPILLARY TYPE (INTERMEDIATE GRADE). TUMOR IS APPROXIMATELY 0.1 CM FROM MARKED SUPERIOR MARGIN AND APPROXIMATELY 0.6 CM FROM MARKED INFERIOR MARGIN. OTHER MARGINS OF RESECTION ARE GREATER THAN 1 CM FROM TUMOR. Interpretation   [facility] Multiple Marker Panel:   TEST NAME: RESULTS: INTERNAL CONTROLS:   ESTROGEN RECEPTOR: Positive (99.7%) Present, Positive   PROGESTERONE RECEPTOR: Positive (35.4%) Present, Positive     LABORATORY: none today    RADIOLOGY:    Mri Breast Bi W Wo Cont    Result Date: 1/17/2017  MRI of the Breasts with and without contrast CLINICAL INDICATION:  New diagnosis of right breast DCIS on surgical resection for atypical ductal hyperplasia which was at 10:30 position middle depth, evaluate for possible positive margins and planning of additional therapy COMPARISON: Mammography 1/6/2017 and others back to 11/22/2016 TECHNIQUE: Standard MRI sequences were obtained through the breasts in multiple planes. Images were obtained before and after intravenous infusion of 19 mL of Multihance contrast. Images were reviewed with PACS and with [employer] CAD software. Dictation was delayed by 4 days due to technical difficulties with CAD. FINDINGS: The breasts demonstrate mild-moderate background glandularity and minimal enhancement. There is a 4.8 x 3.1 cm fluid collection at the right 10:30 lumpectomy site. There is minimal thin linear enhancement surrounding the fluid collection, most prominent posteriorly, which is likely reactive change although residual DCIS is not excluded.  There is no evidence of suspicious enhancing mass or nonmass enhancement to suggest malignancy in either breast. There is a benign 1.0 cm cyst in the left retroareolar breast. There is no evidence of axillary or internal mammary lymphadenopathy. A 1.4 x 1.0 cm right axillary lymph node (series 102 image 170) demonstrates borderline nonspecific cortical thickening, but maintenance of reniform shape and fatty hilum. Otherwise, limited evaluation of the thorax and upper abdomen is unremarkable. IMPRESSION: 1. Right 10:30 surgically proven DCIS demonstrates a 4.8 cm postoperative fluid collection, but no suspicious mass. Minimal surrounding enhancement is likely reactive. 2. Single right axillary lymph node with borderline cortical thickening but no abnormal enlargement or dysmorphology otherwise. This is nonspecific and more likely reactive than metastatic. If it would be clinically beneficial an ultrasound-guided biopsy could be attempted. 3. No other suspicious finding otherwise in either breast or the chest wall. Recommend management as directed clinically. BI-RADS Assessment Category 6: Known Biopsy Proven Malignancy     [place] Breast Rt Surg Spec    Result Date: 1/6/2017  RIGHT BREAST WIRE LOCALIZATION WITH MAMMOGRAPHIC GUIDANCE,                RIGHT BREAST SPECIMEN DIGITAL RADIOGRAPHY TIMES THREE: CLINICAL HISTORY:  Recent needle biopsy diagnosis of ADH; for preoperative localization. WIRE LOCALIZATON:  Written informed consent was obtained. The biopsy marker clip was localized with lateromedial compression. Using standard aseptic technique and 1% Xylocaine local anesthesia, a 20-gauge Kopans needle was used for localization. When the needle was removed, the biopsy marker clip and residual adjacent microcalcifications lay at the level of the proximal end of the wire thickening. A second biopsy marker clip from prior benign biopsy in 2011 lays just posteromedial to it. These considerations were discussed by telephone with Dr. Erin Ramirez prior to surgery. A copy of the localization images was delivered with the patient to the operating suite.  SPECIMEN RADIOGRAPHS:  Two magnification digital images of the surgical specimen in orthogonal planes as well as a third image with it in a grid demonstrate the wire tip and both biopsy marker clips centrally within the specimen. The approximate position of the clip marking recent needle diagnosis of ADH was localized by placement of a straight pin in specimen grid position D-6. IMPRESSION:  SUCCESSFUL WIRE LOCALIZATION AND EXCISION OF THE BIOPSY MARKER CLIPS. [facility] Ndl/wire/clip/seed Breast Rt    Result Date: 1/6/2017  RIGHT BREAST WIRE LOCALIZATION WITH MAMMOGRAPHIC GUIDANCE,                RIGHT BREAST SPECIMEN DIGITAL RADIOGRAPHY TIMES THREE: CLINICAL HISTORY:  Recent needle biopsy diagnosis of ADH; for preoperative localization. WIRE LOCALIZATON:  Written informed consent was obtained. The biopsy marker clip was localized with lateromedial compression. Using standard aseptic technique and 1% Xylocaine local anesthesia, a 20-gauge Kopans needle was used for localization. When the needle was removed, the biopsy marker clip and residual adjacent microcalcifications lay at the level of the proximal end of the wire thickening. A second biopsy marker clip from prior benign biopsy in 2011 lays just posteromedial to it. These considerations were discussed by telephone with Dr. Fransisco Nash prior to surgery. A copy of the localization images was delivered with the patient to the operating suite. SPECIMEN RADIOGRAPHS:  Two magnification digital images of the surgical specimen in orthogonal planes as well as a third image with it in a grid demonstrate the wire tip and both biopsy marker clips centrally within the specimen. The approximate position of the clip marking recent needle diagnosis of ADH was localized by placement of a straight pin in specimen grid position D-6. IMPRESSION:  SUCCESSFUL WIRE LOCALIZATION AND EXCISION OF THE BIOPSY MARKER CLIPS. IMPRESSION: Del Wilcox is a 64 y.o. female with right breast DCIS, [id number], Stage 0, Intermediate grade.   ER 99%, IA 35% S/P right lumpecomty, 1/6/17 and re-Excision, 2/16/17. She is one month following completion of radiation therapy. She is recovering as anticipated from radiation. She has started AI therapy (Arimidex) under the management of Dr Armando Alcantara and is tolerating well. Dexa in May 2017 shows osteopenia in which she is taking vitamin D and Calcium. She is scheduled for her mammogram 11/02/17 by Dr. Armando Alcantara. I will see her shortly thereafter. She has a history of seizures that have worsened over theses past 1-2 months - followed by neurology    PLAN  1) mammogram scheduled 11/0/2/17, I will see her shortly thereafter  2) AI under the management of Dr. Armando Alcantara  3) Follow up every 6 months x2 years then yearly generally      Max Sanchez NP   June 14, 2017       Portions of this note were copied from prior encounters and reviewed for accuracy, currency, and represent documentation and tasks completed during this encounter. I verify and attest these portions to be unchanged from prior visits.

## 2017-08-09 ENCOUNTER — HOSPITAL ENCOUNTER (OUTPATIENT)
Dept: SURGERY | Age: 57
Discharge: HOME | End: 2017-08-09
Payer: OTHER GOVERNMENT

## 2017-08-09 VITALS
OXYGEN SATURATION: 97 % | SYSTOLIC BLOOD PRESSURE: 149 MMHG | HEIGHT: 65 IN | TEMPERATURE: 98.9 F | HEART RATE: 95 BPM | BODY MASS INDEX: 33.99 KG/M2 | RESPIRATION RATE: 16 BRPM | DIASTOLIC BLOOD PRESSURE: 90 MMHG | WEIGHT: 204 LBS

## 2017-08-09 LAB
ANION GAP BLD CALC-SCNC: 8 MMOL/L (ref 7–16)
APPEARANCE UR: ABNORMAL
BACTERIA SPEC CULT: NORMAL
BACTERIA URNS QL MICRO: ABNORMAL /HPF
BASOPHILS # BLD AUTO: 0 K/UL (ref 0–0.2)
BASOPHILS # BLD: 0 % (ref 0–2)
BILIRUB UR QL: NEGATIVE
BUN SERPL-MCNC: 13 MG/DL (ref 6–23)
CALCIUM SERPL-MCNC: 9 MG/DL (ref 8.3–10.4)
CASTS URNS QL MICRO: ABNORMAL /LPF
CHLORIDE SERPL-SCNC: 108 MMOL/L (ref 98–107)
CO2 SERPL-SCNC: 25 MMOL/L (ref 21–32)
COLOR UR: YELLOW
CREAT SERPL-MCNC: 0.73 MG/DL (ref 0.6–1)
DIFFERENTIAL METHOD BLD: ABNORMAL
EOSINOPHIL # BLD: 0.1 K/UL (ref 0–0.8)
EOSINOPHIL NFR BLD: 1 % (ref 0.5–7.8)
EPI CELLS #/AREA URNS HPF: ABNORMAL /HPF
ERYTHROCYTE [DISTWIDTH] IN BLOOD BY AUTOMATED COUNT: 16.6 % (ref 11.9–14.6)
GLUCOSE SERPL-MCNC: 86 MG/DL (ref 65–100)
GLUCOSE UR STRIP.AUTO-MCNC: NEGATIVE MG/DL
HCT VFR BLD AUTO: 41.4 % (ref 35.8–46.3)
HGB BLD-MCNC: 13.6 G/DL (ref 11.7–15.4)
HGB UR QL STRIP: ABNORMAL
IMM GRANULOCYTES # BLD: 0 K/UL (ref 0–0.5)
IMM GRANULOCYTES NFR BLD AUTO: 0.2 % (ref 0–5)
KETONES UR QL STRIP.AUTO: NEGATIVE MG/DL
LEUKOCYTE ESTERASE UR QL STRIP.AUTO: NEGATIVE
LYMPHOCYTES # BLD AUTO: 21 % (ref 13–44)
LYMPHOCYTES # BLD: 2.4 K/UL (ref 0.5–4.6)
MCH RBC QN AUTO: 27 PG (ref 26.1–32.9)
MCHC RBC AUTO-ENTMCNC: 32.9 G/DL (ref 31.4–35)
MCV RBC AUTO: 82.1 FL (ref 79.6–97.8)
MONOCYTES # BLD: 0.7 K/UL (ref 0.1–1.3)
MONOCYTES NFR BLD AUTO: 6 % (ref 4–12)
NEUTS SEG # BLD: 8.5 K/UL (ref 1.7–8.2)
NEUTS SEG NFR BLD AUTO: 72 % (ref 43–78)
NITRITE UR QL STRIP.AUTO: NEGATIVE
PH UR STRIP: 6 [PH] (ref 5–9)
PLATELET # BLD AUTO: 321 K/UL (ref 150–450)
PMV BLD AUTO: 8.7 FL (ref 10.8–14.1)
POTASSIUM SERPL-SCNC: 3.9 MMOL/L (ref 3.5–5.1)
PROT UR STRIP-MCNC: NEGATIVE MG/DL
RBC # BLD AUTO: 5.04 M/UL (ref 4.05–5.25)
RBC #/AREA URNS HPF: ABNORMAL /HPF
SERVICE CMNT-IMP: NORMAL
SODIUM SERPL-SCNC: 141 MMOL/L (ref 136–145)
SP GR UR REFRACTOMETRY: 1.02 (ref 1–1.02)
UROBILINOGEN UR QL STRIP.AUTO: 0.2 EU/DL (ref 0.2–1)
WBC # BLD AUTO: 11.8 K/UL (ref 4.3–11.1)
WBC URNS QL MICRO: ABNORMAL /HPF

## 2017-08-09 PROCEDURE — 85025 COMPLETE CBC W/AUTO DIFF WBC: CPT | Performed by: NEUROLOGICAL SURGERY

## 2017-08-09 PROCEDURE — 80048 BASIC METABOLIC PNL TOTAL CA: CPT | Performed by: NEUROLOGICAL SURGERY

## 2017-08-09 PROCEDURE — 81001 URINALYSIS AUTO W/SCOPE: CPT | Performed by: NEUROLOGICAL SURGERY

## 2017-08-09 PROCEDURE — 87641 MR-STAPH DNA AMP PROBE: CPT | Performed by: NEUROLOGICAL SURGERY

## 2017-08-09 NOTE — PERIOP NOTES
Patient verified name, , and surgery as listed in [facility]. Patient provided medical/health information and PTA medications to the best of their ability. TYPE  CASE: 3  Orders per surgeon: yes received  Labs per surgeon: cbc,bmp,urine,mrsa swab. Results: -  Labs per anesthesia protocol: type and screen-dos. Results-  EKG:  na     Nasal Swab collected per MD order and instructions for Mupirocin nasal ointment if required. Patient provided with and instructed on education handouts including Guide to Surgery, blood transfusions, pain management, and hand hygiene for the family and community, and [facility] brochure. Road to Recovery Spine surgery patient guide given. Instructed on incentive spirometry with return demonstration. Long handled prehab sponge given with instructions for use. Patient viewed spine prehab video. Hibiclens and instructions given per hospital policy. Instructed patient to continue previous medications as prescribed prior to surgery unless otherwise directed and to take the following medications the day of surgery according to anesthesia guidelines : arimidex,lexapro,vimpat,keppra,tramadol as needed . Instructed patient to hold  the following medications on the day of surgery: calcium,aleve-sinus. Original medication prescription bottles none visualized during patient appointment. Patient teach back successful and patient demonstrates knowledge of instruction.

## 2017-08-16 ENCOUNTER — ANESTHESIA EVENT (OUTPATIENT)
Dept: SURGERY | Age: 57
DRG: 460 | End: 2017-08-16
Payer: OTHER GOVERNMENT

## 2017-08-17 ENCOUNTER — APPOINTMENT (OUTPATIENT)
Dept: GENERAL RADIOLOGY | Age: 57
DRG: 460 | End: 2017-08-17
Attending: NEUROLOGICAL SURGERY
Payer: OTHER GOVERNMENT

## 2017-08-17 ENCOUNTER — HOSPITAL ENCOUNTER (INPATIENT)
Age: 57
LOS: 3 days | Discharge: HOME | DRG: 460 | End: 2017-08-20
Attending: NEUROLOGICAL SURGERY | Admitting: NEUROLOGICAL SURGERY
Payer: OTHER GOVERNMENT

## 2017-08-17 ENCOUNTER — ANESTHESIA (OUTPATIENT)
Dept: SURGERY | Age: 57
DRG: 460 | End: 2017-08-17
Payer: OTHER GOVERNMENT

## 2017-08-17 LAB
ABO + RH BLD: NORMAL
BLOOD GROUP ANTIBODIES SERPL: NORMAL
SPECIMEN EXP DATE BLD: NORMAL

## 2017-08-17 PROCEDURE — 77030013292 HC BOWL MX PRSM J&J -A: Performed by: NURSE ANESTHETIST, CERTIFIED REGISTERED

## 2017-08-17 PROCEDURE — 74011250636 HC RX REV CODE- 250/636

## 2017-08-17 PROCEDURE — 74011000250 HC RX REV CODE- 250: Performed by: NEUROLOGICAL SURGERY

## 2017-08-17 PROCEDURE — 77030012894: Performed by: NEUROLOGICAL SURGERY

## 2017-08-17 PROCEDURE — 76010000175 HC OR TIME 4 TO 4.5 HR INTENSV-TIER 1: Performed by: NEUROLOGICAL SURGERY

## 2017-08-17 PROCEDURE — 77030004391 HC BUR FLUT MEDT -C: Performed by: NEUROLOGICAL SURGERY

## 2017-08-17 PROCEDURE — 77030012407 HC DRN WND BARD -B: Performed by: NEUROLOGICAL SURGERY

## 2017-08-17 PROCEDURE — 77030003193 HC GRFT RECOMB BN MEDT -H: Performed by: NEUROLOGICAL SURGERY

## 2017-08-17 PROCEDURE — 77030032490 HC SLV COMPR SCD KNE COVD -B: Performed by: NEUROLOGICAL SURGERY

## 2017-08-17 PROCEDURE — 76210000017 HC OR PH I REC 1.5 TO 2 HR: Performed by: NEUROLOGICAL SURGERY

## 2017-08-17 PROCEDURE — 74011250637 HC RX REV CODE- 250/637: Performed by: NEUROLOGICAL SURGERY

## 2017-08-17 PROCEDURE — 74011250636 HC RX REV CODE- 250/636: Performed by: NEUROLOGICAL SURGERY

## 2017-08-17 PROCEDURE — 74011250636 HC RX REV CODE- 250/636: Performed by: ANESTHESIOLOGY

## 2017-08-17 PROCEDURE — 77030011640 HC PAD GRND REM COVD -A: Performed by: NEUROLOGICAL SURGERY

## 2017-08-17 PROCEDURE — C1713 ANCHOR/SCREW BN/BN,TIS/BN: HCPCS | Performed by: NEUROLOGICAL SURGERY

## 2017-08-17 PROCEDURE — 77030003666 HC NDL SPINAL BD -A: Performed by: NEUROLOGICAL SURGERY

## 2017-08-17 PROCEDURE — C1769 GUIDE WIRE: HCPCS | Performed by: NEUROLOGICAL SURGERY

## 2017-08-17 PROCEDURE — 74011000250 HC RX REV CODE- 250

## 2017-08-17 PROCEDURE — 76060000039 HC ANESTHESIA 4 TO 4.5 HR: Performed by: NEUROLOGICAL SURGERY

## 2017-08-17 PROCEDURE — 77030020782 HC GWN BAIR PAWS FLX 3M -B: Performed by: NURSE ANESTHETIST, CERTIFIED REGISTERED

## 2017-08-17 PROCEDURE — 77030013794 HC KT TRNSDUC BLD EDWD -B: Performed by: NURSE ANESTHETIST, CERTIFIED REGISTERED

## 2017-08-17 PROCEDURE — 77030008467 HC STPLR SKN COVD -B: Performed by: NEUROLOGICAL SURGERY

## 2017-08-17 PROCEDURE — 77030014007 HC SPNG HEMSTAT J&J -B: Performed by: NEUROLOGICAL SURGERY

## 2017-08-17 PROCEDURE — 77030013567 HC DRN WND RESERV BARD -A: Performed by: NEUROLOGICAL SURGERY

## 2017-08-17 PROCEDURE — 77030008477 HC STYL SATN SLP COVD -A: Performed by: NURSE ANESTHETIST, CERTIFIED REGISTERED

## 2017-08-17 PROCEDURE — 77030022373 HC SPCR SPN STAXX SPWV -K1: Performed by: NEUROLOGICAL SURGERY

## 2017-08-17 PROCEDURE — 74011250637 HC RX REV CODE- 250/637: Performed by: ANESTHESIOLOGY

## 2017-08-17 PROCEDURE — 86900 BLOOD TYPING SEROLOGIC ABO: CPT | Performed by: ANESTHESIOLOGY

## 2017-08-17 PROCEDURE — 77030019908 HC STETH ESOPH SIMS -A: Performed by: NURSE ANESTHETIST, CERTIFIED REGISTERED

## 2017-08-17 PROCEDURE — 77030005401 HC CATH RAD ARRO -A: Performed by: NURSE ANESTHETIST, CERTIFIED REGISTERED

## 2017-08-17 PROCEDURE — 77030030163 HC BN WAX J&J -A: Performed by: NEUROLOGICAL SURGERY

## 2017-08-17 PROCEDURE — 77030034850: Performed by: NEUROLOGICAL SURGERY

## 2017-08-17 PROCEDURE — 77030018836 HC SOL IRR NACL ICUM -A: Performed by: NEUROLOGICAL SURGERY

## 2017-08-17 PROCEDURE — 77030031139 HC SUT VCRL2 J&J -A: Performed by: NEUROLOGICAL SURGERY

## 2017-08-17 PROCEDURE — 65270000029 HC RM PRIVATE

## 2017-08-17 PROCEDURE — 77030034475 HC MISC IMPL SPN: Performed by: NEUROLOGICAL SURGERY

## 2017-08-17 PROCEDURE — 77030019940 HC BLNKT HYPOTHRM STRY -B: Performed by: NURSE ANESTHETIST, CERTIFIED REGISTERED

## 2017-08-17 PROCEDURE — 77030003029 HC SUT VCRL J&J -B: Performed by: NEUROLOGICAL SURGERY

## 2017-08-17 PROCEDURE — 77030013261: Performed by: NEUROLOGICAL SURGERY

## 2017-08-17 PROCEDURE — 77030008703 HC TU ET UNCUF COVD -A: Performed by: NURSE ANESTHETIST, CERTIFIED REGISTERED

## 2017-08-17 DEVICE — BONE GRAFT KIT 7510100 INFUSE X SMALL
Type: IMPLANTABLE DEVICE | Site: SPINE LUMBAR | Status: FUNCTIONAL
Brand: INFUSE® BONE GRAFT

## 2017-08-17 DEVICE — SPACER SPNL W9XH7XL22MM TANT MRK LO PROF SELF EXP CONVX: Type: IMPLANTABLE DEVICE | Site: SPINE LUMBAR | Status: FUNCTIONAL

## 2017-08-17 DEVICE — SCREW 75446550 MULTI AXIAL 6.5X50  TI
Type: IMPLANTABLE DEVICE | Site: SPINE LUMBAR | Status: FUNCTIONAL
Brand: CD HORIZON® SPINAL SYSTEM

## 2017-08-17 RX ORDER — MIDAZOLAM HYDROCHLORIDE 1 MG/ML
2 INJECTION, SOLUTION INTRAMUSCULAR; INTRAVENOUS
Status: COMPLETED | OUTPATIENT
Start: 2017-08-17 | End: 2017-08-17

## 2017-08-17 RX ORDER — ROCURONIUM BROMIDE 10 MG/ML
INJECTION, SOLUTION INTRAVENOUS AS NEEDED
Status: DISCONTINUED | OUTPATIENT
Start: 2017-08-17 | End: 2017-08-17 | Stop reason: HOSPADM

## 2017-08-17 RX ORDER — FENTANYL CITRATE 50 UG/ML
INJECTION, SOLUTION INTRAMUSCULAR; INTRAVENOUS AS NEEDED
Status: DISCONTINUED | OUTPATIENT
Start: 2017-08-17 | End: 2017-08-17 | Stop reason: HOSPADM

## 2017-08-17 RX ORDER — NEOSTIGMINE METHYLSULFATE 1 MG/ML
INJECTION INTRAVENOUS AS NEEDED
Status: DISCONTINUED | OUTPATIENT
Start: 2017-08-17 | End: 2017-08-17 | Stop reason: HOSPADM

## 2017-08-17 RX ORDER — OXYCODONE AND ACETAMINOPHEN 5; 325 MG/1; MG/1
2 TABLET ORAL
Status: DISCONTINUED | OUTPATIENT
Start: 2017-08-17 | End: 2017-08-20 | Stop reason: HOSPADM

## 2017-08-17 RX ORDER — SODIUM CHLORIDE 0.9 % (FLUSH) 0.9 %
5-10 SYRINGE (ML) INJECTION EVERY 8 HOURS
Status: DISCONTINUED | OUTPATIENT
Start: 2017-08-17 | End: 2017-08-20 | Stop reason: HOSPADM

## 2017-08-17 RX ORDER — LIDOCAINE HYDROCHLORIDE 10 MG/ML
0.1 INJECTION INFILTRATION; PERINEURAL AS NEEDED
Status: DISCONTINUED | OUTPATIENT
Start: 2017-08-17 | End: 2017-08-17 | Stop reason: HOSPADM

## 2017-08-17 RX ORDER — SODIUM CHLORIDE 0.9 % (FLUSH) 0.9 %
5-10 SYRINGE (ML) INJECTION EVERY 8 HOURS
Status: DISCONTINUED | OUTPATIENT
Start: 2017-08-17 | End: 2017-08-17 | Stop reason: SDUPTHER

## 2017-08-17 RX ORDER — OXYCODONE HYDROCHLORIDE 5 MG/1
10 TABLET ORAL ONCE
Status: COMPLETED | OUTPATIENT
Start: 2017-08-17 | End: 2017-08-17

## 2017-08-17 RX ORDER — ONDANSETRON 2 MG/ML
INJECTION INTRAMUSCULAR; INTRAVENOUS AS NEEDED
Status: DISCONTINUED | OUTPATIENT
Start: 2017-08-17 | End: 2017-08-17 | Stop reason: HOSPADM

## 2017-08-17 RX ORDER — DEXTROSE, SODIUM CHLORIDE, AND POTASSIUM CHLORIDE 5; .45; .15 G/100ML; G/100ML; G/100ML
75 INJECTION INTRAVENOUS CONTINUOUS
Status: DISCONTINUED | OUTPATIENT
Start: 2017-08-17 | End: 2017-08-20 | Stop reason: HOSPADM

## 2017-08-17 RX ORDER — HEPARIN SODIUM 5000 [USP'U]/ML
5000 INJECTION, SOLUTION INTRAVENOUS; SUBCUTANEOUS EVERY 12 HOURS
Status: DISCONTINUED | OUTPATIENT
Start: 2017-08-17 | End: 2017-08-20 | Stop reason: HOSPADM

## 2017-08-17 RX ORDER — MORPHINE SULFATE 10 MG/ML
10 INJECTION, SOLUTION INTRAMUSCULAR; INTRAVENOUS
Status: DISCONTINUED | OUTPATIENT
Start: 2017-08-17 | End: 2017-08-20 | Stop reason: HOSPADM

## 2017-08-17 RX ORDER — SODIUM CHLORIDE 0.9 % (FLUSH) 0.9 %
5-10 SYRINGE (ML) INJECTION AS NEEDED
Status: DISCONTINUED | OUTPATIENT
Start: 2017-08-17 | End: 2017-08-17 | Stop reason: SDUPTHER

## 2017-08-17 RX ORDER — SODIUM CHLORIDE, SODIUM LACTATE, POTASSIUM CHLORIDE, CALCIUM CHLORIDE 600; 310; 30; 20 MG/100ML; MG/100ML; MG/100ML; MG/100ML
1000 INJECTION, SOLUTION INTRAVENOUS CONTINUOUS
Status: DISCONTINUED | OUTPATIENT
Start: 2017-08-17 | End: 2017-08-17 | Stop reason: HOSPADM

## 2017-08-17 RX ORDER — ONDANSETRON 2 MG/ML
4 INJECTION INTRAMUSCULAR; INTRAVENOUS
Status: DISCONTINUED | OUTPATIENT
Start: 2017-08-17 | End: 2017-08-17 | Stop reason: HOSPADM

## 2017-08-17 RX ORDER — ACETAMINOPHEN 500 MG
1000 TABLET ORAL ONCE
Status: COMPLETED | OUTPATIENT
Start: 2017-08-17 | End: 2017-08-17

## 2017-08-17 RX ORDER — SODIUM CHLORIDE 0.9 % (FLUSH) 0.9 %
5-10 SYRINGE (ML) INJECTION AS NEEDED
Status: DISCONTINUED | OUTPATIENT
Start: 2017-08-17 | End: 2017-08-17 | Stop reason: HOSPADM

## 2017-08-17 RX ORDER — ALBUTEROL SULFATE 0.83 MG/ML
2.5 SOLUTION RESPIRATORY (INHALATION) AS NEEDED
Status: DISCONTINUED | OUTPATIENT
Start: 2017-08-17 | End: 2017-08-17 | Stop reason: HOSPADM

## 2017-08-17 RX ORDER — DEXAMETHASONE SODIUM PHOSPHATE 4 MG/ML
INJECTION, SOLUTION INTRA-ARTICULAR; INTRALESIONAL; INTRAMUSCULAR; INTRAVENOUS; SOFT TISSUE AS NEEDED
Status: DISCONTINUED | OUTPATIENT
Start: 2017-08-17 | End: 2017-08-17 | Stop reason: HOSPADM

## 2017-08-17 RX ORDER — LIDOCAINE HYDROCHLORIDE 20 MG/ML
INJECTION, SOLUTION EPIDURAL; INFILTRATION; INTRACAUDAL; PERINEURAL AS NEEDED
Status: DISCONTINUED | OUTPATIENT
Start: 2017-08-17 | End: 2017-08-17 | Stop reason: HOSPADM

## 2017-08-17 RX ORDER — CLONIDINE HYDROCHLORIDE 0.1 MG/1
0.1 TABLET ORAL
Status: DISCONTINUED | OUTPATIENT
Start: 2017-08-17 | End: 2017-08-20 | Stop reason: HOSPADM

## 2017-08-17 RX ORDER — ACETAMINOPHEN 10 MG/ML
INJECTION, SOLUTION INTRAVENOUS AS NEEDED
Status: DISCONTINUED | OUTPATIENT
Start: 2017-08-17 | End: 2017-08-17 | Stop reason: HOSPADM

## 2017-08-17 RX ORDER — NALOXONE HYDROCHLORIDE 0.4 MG/ML
0.4 INJECTION, SOLUTION INTRAMUSCULAR; INTRAVENOUS; SUBCUTANEOUS AS NEEDED
Status: DISCONTINUED | OUTPATIENT
Start: 2017-08-17 | End: 2017-08-20 | Stop reason: HOSPADM

## 2017-08-17 RX ORDER — GLYCOPYRROLATE 0.2 MG/ML
INJECTION INTRAMUSCULAR; INTRAVENOUS AS NEEDED
Status: DISCONTINUED | OUTPATIENT
Start: 2017-08-17 | End: 2017-08-17 | Stop reason: HOSPADM

## 2017-08-17 RX ORDER — PROPOFOL 10 MG/ML
INJECTION, EMULSION INTRAVENOUS AS NEEDED
Status: DISCONTINUED | OUTPATIENT
Start: 2017-08-17 | End: 2017-08-17 | Stop reason: HOSPADM

## 2017-08-17 RX ORDER — CEFAZOLIN SODIUM IN 0.9 % NACL 2 G/50 ML
2 INTRAVENOUS SOLUTION, PIGGYBACK (ML) INTRAVENOUS ONCE
Status: COMPLETED | OUTPATIENT
Start: 2017-08-17 | End: 2017-08-17

## 2017-08-17 RX ORDER — AMOXICILLIN 250 MG
2 CAPSULE ORAL DAILY
Status: DISCONTINUED | OUTPATIENT
Start: 2017-08-18 | End: 2017-08-20 | Stop reason: HOSPADM

## 2017-08-17 RX ORDER — ACETAMINOPHEN 325 MG/1
650 TABLET ORAL
Status: DISCONTINUED | OUTPATIENT
Start: 2017-08-17 | End: 2017-08-20 | Stop reason: HOSPADM

## 2017-08-17 RX ORDER — BUPIVACAINE HYDROCHLORIDE AND EPINEPHRINE 5; 5 MG/ML; UG/ML
INJECTION, SOLUTION EPIDURAL; INTRACAUDAL; PERINEURAL AS NEEDED
Status: DISCONTINUED | OUTPATIENT
Start: 2017-08-17 | End: 2017-08-17 | Stop reason: HOSPADM

## 2017-08-17 RX ORDER — HYDROMORPHONE HYDROCHLORIDE 2 MG/ML
0.5 INJECTION, SOLUTION INTRAMUSCULAR; INTRAVENOUS; SUBCUTANEOUS
Status: DISCONTINUED | OUTPATIENT
Start: 2017-08-17 | End: 2017-08-17 | Stop reason: HOSPADM

## 2017-08-17 RX ORDER — ONDANSETRON 2 MG/ML
4 INJECTION INTRAMUSCULAR; INTRAVENOUS
Status: DISCONTINUED | OUTPATIENT
Start: 2017-08-17 | End: 2017-08-20 | Stop reason: HOSPADM

## 2017-08-17 RX ORDER — KETAMINE HYDROCHLORIDE 50 MG/ML
INJECTION, SOLUTION INTRAMUSCULAR; INTRAVENOUS AS NEEDED
Status: DISCONTINUED | OUTPATIENT
Start: 2017-08-17 | End: 2017-08-17 | Stop reason: HOSPADM

## 2017-08-17 RX ORDER — SODIUM CHLORIDE 0.9 % (FLUSH) 0.9 %
5-10 SYRINGE (ML) INJECTION AS NEEDED
Status: DISCONTINUED | OUTPATIENT
Start: 2017-08-17 | End: 2017-08-20 | Stop reason: HOSPADM

## 2017-08-17 RX ADMIN — HYDROMORPHONE HYDROCHLORIDE 0.5 MG: 2 INJECTION, SOLUTION INTRAMUSCULAR; INTRAVENOUS; SUBCUTANEOUS at 18:51

## 2017-08-17 RX ADMIN — PROPOFOL 180 MG: 10 INJECTION, EMULSION INTRAVENOUS at 14:32

## 2017-08-17 RX ADMIN — NEOSTIGMINE METHYLSULFATE 2 MG: 1 INJECTION INTRAVENOUS at 17:54

## 2017-08-17 RX ADMIN — FENTANYL CITRATE 50 MCG: 50 INJECTION, SOLUTION INTRAMUSCULAR; INTRAVENOUS at 15:17

## 2017-08-17 RX ADMIN — GLYCOPYRROLATE 0.2 MG: 0.2 INJECTION INTRAMUSCULAR; INTRAVENOUS at 17:54

## 2017-08-17 RX ADMIN — HYDROMORPHONE HYDROCHLORIDE 0.5 MG: 2 INJECTION, SOLUTION INTRAMUSCULAR; INTRAVENOUS; SUBCUTANEOUS at 19:12

## 2017-08-17 RX ADMIN — ROCURONIUM BROMIDE 10 MG: 10 INJECTION, SOLUTION INTRAVENOUS at 15:20

## 2017-08-17 RX ADMIN — ACETAMINOPHEN 1000 MG: 10 INJECTION, SOLUTION INTRAVENOUS at 17:47

## 2017-08-17 RX ADMIN — ACETAMINOPHEN 1000 MG: 500 TABLET, FILM COATED ORAL at 11:52

## 2017-08-17 RX ADMIN — CEFAZOLIN 2 G: 1 INJECTION, POWDER, FOR SOLUTION INTRAMUSCULAR; INTRAVENOUS; PARENTERAL at 14:51

## 2017-08-17 RX ADMIN — ROCURONIUM BROMIDE 15 MG: 10 INJECTION, SOLUTION INTRAVENOUS at 16:28

## 2017-08-17 RX ADMIN — SODIUM CHLORIDE, SODIUM LACTATE, POTASSIUM CHLORIDE, AND CALCIUM CHLORIDE: 600; 310; 30; 20 INJECTION, SOLUTION INTRAVENOUS at 15:55

## 2017-08-17 RX ADMIN — ROCURONIUM BROMIDE 50 MG: 10 INJECTION, SOLUTION INTRAVENOUS at 14:32

## 2017-08-17 RX ADMIN — ROCURONIUM BROMIDE 10 MG: 10 INJECTION, SOLUTION INTRAVENOUS at 15:41

## 2017-08-17 RX ADMIN — DEXAMETHASONE SODIUM PHOSPHATE 4 MG: 4 INJECTION, SOLUTION INTRA-ARTICULAR; INTRALESIONAL; INTRAMUSCULAR; INTRAVENOUS; SOFT TISSUE at 16:06

## 2017-08-17 RX ADMIN — HYDROMORPHONE HYDROCHLORIDE 0.5 MG: 2 INJECTION, SOLUTION INTRAMUSCULAR; INTRAVENOUS; SUBCUTANEOUS at 18:37

## 2017-08-17 RX ADMIN — LIDOCAINE HYDROCHLORIDE 100 MG: 20 INJECTION, SOLUTION EPIDURAL; INFILTRATION; INTRACAUDAL; PERINEURAL at 14:32

## 2017-08-17 RX ADMIN — FENTANYL CITRATE 50 MCG: 50 INJECTION, SOLUTION INTRAMUSCULAR; INTRAVENOUS at 15:20

## 2017-08-17 RX ADMIN — FENTANYL CITRATE 50 MCG: 50 INJECTION, SOLUTION INTRAMUSCULAR; INTRAVENOUS at 14:32

## 2017-08-17 RX ADMIN — HEPARIN SODIUM 5000 UNITS: 5000 INJECTION, SOLUTION INTRAVENOUS; SUBCUTANEOUS at 21:07

## 2017-08-17 RX ADMIN — KETAMINE HYDROCHLORIDE 50 MG: 50 INJECTION, SOLUTION INTRAMUSCULAR; INTRAVENOUS at 16:43

## 2017-08-17 RX ADMIN — Medication 5 ML: at 23:33

## 2017-08-17 RX ADMIN — OXYCODONE HYDROCHLORIDE AND ACETAMINOPHEN 2 TABLET: 5; 325 TABLET ORAL at 21:07

## 2017-08-17 RX ADMIN — FENTANYL CITRATE 25 MCG: 50 INJECTION, SOLUTION INTRAMUSCULAR; INTRAVENOUS at 18:01

## 2017-08-17 RX ADMIN — LIDOCAINE HYDROCHLORIDE 100 MG: 20 INJECTION, SOLUTION EPIDURAL; INFILTRATION; INTRACAUDAL; PERINEURAL at 18:01

## 2017-08-17 RX ADMIN — DEXTROSE MONOHYDRATE, SODIUM CHLORIDE, AND POTASSIUM CHLORIDE 75 ML/HR: 50; 4.5; 1.49 INJECTION, SOLUTION INTRAVENOUS at 21:26

## 2017-08-17 RX ADMIN — ONDANSETRON 4 MG: 2 INJECTION INTRAMUSCULAR; INTRAVENOUS at 16:06

## 2017-08-17 RX ADMIN — OXYCODONE HYDROCHLORIDE 10 MG: 5 TABLET ORAL at 19:53

## 2017-08-17 RX ADMIN — MIDAZOLAM HYDROCHLORIDE 2 MG: 1 INJECTION, SOLUTION INTRAMUSCULAR; INTRAVENOUS at 12:39

## 2017-08-17 RX ADMIN — FENTANYL CITRATE 50 MCG: 50 INJECTION, SOLUTION INTRAMUSCULAR; INTRAVENOUS at 15:15

## 2017-08-17 RX ADMIN — SODIUM CHLORIDE, SODIUM LACTATE, POTASSIUM CHLORIDE, AND CALCIUM CHLORIDE 1000 ML: 600; 310; 30; 20 INJECTION, SOLUTION INTRAVENOUS at 11:53

## 2017-08-17 NOTE — ANESTHESIA PROCEDURE NOTES
Arterial Line Placement    Start time: 8/17/2017 2:40 PM  End time: 8/17/2017 2:42 PM  Performed by: Elham Kirby  Authorized by: Elham Kirby     Pre-Procedure  Indications:  Arterial pressure monitoring and other (comment)  Preanesthetic Checklist: patient identified, risks and benefits discussed, patient being monitored and patient being monitored    Timeout Time: 14:40        Procedure:   Prep:  Chlorhexidine  Seldinger Technique?: Yes    Orientation:  Right  Location:  Radial artery  Catheter size:  20 G  Number of attempts:  1  Cont Cardiac Output Sensor: No      Assessment:   Post-procedure:  Line secured and sterile dressing applied  Patient Tolerance:  Patient tolerated the procedure well with no immediate complications  Comment:   Place post induction by Agustina Norman CRNA under my direction.

## 2017-08-17 NOTE — IP AVS SNAPSHOT
[address] 
[phone] Patient: David Hendrix 
MRN: [id number] You are allergic to the following Allergen Reactions Levaquin (Levofloxacin) Rash Lyrica (Pregabalin) Swelling Sulfa (Sulfonamide Antibiotics) Itching Nausea Only Recent Documentation Height Weight Breastfeeding? BMI OB Status Smoking Status 1.651 m 90.3 kg No 33.12 kg/m2 Postmenopausal Current Every Day Smoker Emergency Contacts Name Discharge Info Relation Home Work Mobile [address] CAREGIVER [3] Spouse [3] [phone] DISCHARGE CAREGIVER [3] Friend [5] [phone] About your hospitalization You were admitted on:  2017 You last received care in the:  [facility] 7 MED SURG You were discharged on:  2017 Unit phone number:  [phone] Why you were hospitalized Your primary diagnosis was:  Not on File Your diagnoses also included:  Lumbar Stenosis With Neurogenic Claudication Providers Seen During Your Hospitalizations Provider Role Specialty Primary office phone Sara Dillard MD Attending Provider Neurosurgery [phone] Your Primary Care Physician (PCP) Primary Care Physician Office Phone Office Fax [facility] [phone] Follow-up Information Follow up With Details Comments Contact Info Sally Leventhal, MD   [place] [address] Marifer Jaramillo 
[phone] Current Discharge Medication List  
  
CONTINUE these medications which have CHANGED Dose & Instructions Dispensing Information Comments Morning Noon Evening Bedtime  
 anastrozole 1 mg tablet Commonly known as:  ARIMIDEX What changed:  when to take this Notes to Patient:  No t given in hospital  
   
 Dose:  1 mg Take 1 Tab by mouth daily. Quantity:  90 Tab Refills:  3 CONTINUE these medications which have NOT CHANGED Dose & Instructions Dispensing Information Comments Morning Noon Evening Bedtime ALEVE-D SINUS AND COLD 220-120 mg Tb12 Generic drug:  naproxen na-pseudoephedrine Take  by mouth daily as needed. Last taken 2/14/17  Indications: hold until after surgery Refills:  0 AMBIEN CR 12.5 mg tablet Generic drug:  zolpidem CR Notes to Patient:  No t given in hospital  
   
 Dose:  12.5 mg Take 12.5 mg by mouth nightly. Refills:  0 Calcium Carbonate-Vit D3-Min 600 mg calcium- 400 unit Tab Notes to Patient:  No t given in hospital  
   
 Dose:  1 Tab Take 1 Tab by mouth daily. Refills:  0  
     
   
   
   
  
 levETIRAcetam 500 mg tablet Commonly known as:  KEPPRA Your last dose was: This morning 0900 Dose:  500 mg Take 500 mg by mouth two (2) times a day. Refills:  0 LEXAPRO 20 mg tablet Generic drug:  escitalopram oxalate Your last dose was: This morning 0900 Dose:  30 mg Take 30 mg by mouth every morning. 30mg in am  Indications: take on the [facility] Refills:  0  
     
   
   
   
  
 traMADol 50 mg tablet Commonly known as:  ULTRAM  
Notes to Patient:  No t given in hospital  
   
 Dose:  50 mg Take 1 Tab by mouth every six (6) hours as needed for Pain. Max Daily Amount: 200 mg. Quantity:  90 Tab Refills:  1  
     
   
   
   
  
 traZODone 100 mg tablet Commonly known as:  Lesvia Schmitt Notes to Patient:  No t given in hospital  
   
 Dose:  400 mg Take 400 mg by mouth nightly. Indications: major depressive disorder Refills:  0 VIMPAT 200 mg Tab tablet Generic drug:  lacosamide Notes to Patient:  No t given in hospital  
   
 Dose:  200 mg  
 Take 200 mg by mouth two (2) times a day. Indications: take on the [facility] Refills:  0 Discharge Instructions None Discharge Orders None Introducing [facility]! Dear Michell Li: Thank you for requesting a via[employer] account. Our records indicate that you already have an active viaC[employer] account. You can access your account anytime at [email or web address] Did you know that you can access your hospital and ER discharge instructions at any time in via[employer]? You can also review all of your test results from your hospital stay or ER visit. Additional Information If you have questions, please visit the Frequently Asked Questions section of the [employer] website at [email or web address] [email or web address] Remember, via[employer] is NOT to be used for urgent needs. For medical emergencies, dial 911. Now available from your iPhone and Android! General Information Please provide this summary of care documentation to your next provider. Patient Signature:  ____________________________________________________________ Date:  ____________________________________________________________  
  
Alba Grant Provider Signature:  ____________________________________________________________ Date:  ____________________________________________________________

## 2017-08-17 NOTE — IP AVS SNAPSHOT
[address] 
[phone] Patient: Quinton Mitchell 
MRN: [id number] NJA:[id number]/35/2036 Current Discharge Medication List  
  
CONTINUE these medications which have CHANGED Dose & Instructions Dispensing Information Comments Morning Noon Evening Bedtime  
 anastrozole 1 mg tablet Commonly known as:  ARIMIDEX What changed:  when to take this Notes to Patient:  No t given in hospital  
   
 Dose:  1 mg Take 1 Tab by mouth daily. Quantity:  90 Tab Refills:  3 CONTINUE these medications which have NOT CHANGED Dose & Instructions Dispensing Information Comments Morning Noon Evening Bedtime ALEVE-D SINUS AND COLD 220-120 mg Tb12 Generic drug:  naproxen na-pseudoephedrine Take  by mouth daily as needed. Last taken 2/14/17  Indications: hold until after surgery Refills:  0 AMBIEN CR 12.5 mg tablet Generic drug:  zolpidem CR Notes to Patient:  No t given in hospital  
   
 Dose:  12.5 mg Take 12.5 mg by mouth nightly. Refills:  0 Calcium Carbonate-Vit D3-Min 600 mg calcium- 400 unit Tab Notes to Patient:  No t given in hospital  
   
 Dose:  1 Tab Take 1 Tab by mouth daily. Refills:  0  
     
   
   
   
  
 levETIRAcetam 500 mg tablet Commonly known as:  KEPPRA Your last dose was: This morning 0900 Dose:  500 mg Take 500 mg by mouth two (2) times a day. Refills:  0 LEXAPRO 20 mg tablet Generic drug:  escitalopram oxalate Your last dose was: This morning 0900 Dose:  30 mg Take 30 mg by mouth every morning. 30mg in am  Indications: take on the [facility] Refills:  0  
     
   
   
   
  
 traMADol 50 mg tablet Commonly known as:  ULTRAM  
Notes to Patient:  No t given in hospital  
   
 Dose:  50 mg  
 Take 1 Tab by mouth every six (6) hours as needed for Pain. Max Daily Amount: 200 mg. Quantity:  90 Tab Refills:  1  
     
   
   
   
  
 traZODone 100 mg tablet Commonly known as:  Alanis Fu Notes to Patient:  No t given in hospital  
   
 Dose:  400 mg Take 400 mg by mouth nightly. Indications: major depressive disorder Refills:  0 VIMPAT 200 mg Tab tablet Generic drug:  lacosamide Notes to Patient:  No t given in hospital  
   
 Dose:  200 mg Take 200 mg by mouth two (2) times a day. Indications: take on the [facility] Refills:  0

## 2017-08-17 NOTE — ANESTHESIA PREPROCEDURE EVALUATION
Anesthetic History   No history of anesthetic complications            Review of Systems / Medical History  Patient summary reviewed and pertinent labs reviewed    Pulmonary    COPD: moderate      Smoker         Neuro/Psych     seizures: well controlled         Cardiovascular    Hypertension              Exercise tolerance: <4 METS     GI/Hepatic/Renal           PUD     Endo/Other        Arthritis and cancer (current breast)     Other Findings   Comments: Previous vocal cord polyps           Physical Exam    Airway  Mallampati: II  TM Distance: > 6 cm  Neck ROM: normal range of motion   Mouth opening: Normal     Cardiovascular  Regular rate and rhythm,  S1 and S2 normal,  no murmur, click, rub, or gallop  Rhythm: regular  Rate: normal      Pertinent negatives: No murmur, JVD and peripheral edema   Dental  No notable dental hx       Pulmonary    Rhonchi:bibasilar    Wheezes:bibasilar         Abdominal         Other Findings            Anesthetic Plan    ASA: 3  Anesthesia type: general    Monitoring Plan: Arterial line      Induction: Intravenous  Anesthetic plan and risks discussed with: Patient      A-line, 2nd IV

## 2017-08-17 NOTE — ANESTHESIA POSTPROCEDURE EVALUATION
Post-Anesthesia Evaluation and Assessment    Patient: Abdi Mueller MRN: [id number]  SSN: [id number]    YOB: 1960  Age: 64 y.o. Sex: female       Cardiovascular Function/Vital Signs  Visit Vitals    /61    Pulse 92    Temp 36.6 °C (97.8 °F)    Resp 10    Ht 5' 5\" (1.651 m)    Wt 90.3 kg (199 lb)    SpO2 95%    BMI 33.12 kg/m2       Patient is status post general anesthesia for Procedure(s):  LEFT L2-3 OPEN TLIF WITH REVISION OF HARDWARE. Nausea/Vomiting: None    Postoperative hydration reviewed and adequate. Pain:  Pain Scale 1: Visual (08/17/17 1837)  Pain Intensity 1: 5 (08/17/17 1137)   Managed    Neurological Status:   Neuro (WDL): Exceptions to WDL (08/17/17 1837)  Neuro  Neurologic State: Drowsy (08/17/17 1837)  LUE Motor Response: Purposeful (08/17/17 1837)  LLE Motor Response: Purposeful (08/17/17 1837)  RUE Motor Response: Purposeful (08/17/17 1837)  RLE Motor Response: Purposeful (08/17/17 1837)   At baseline    Mental Status and Level of Consciousness: Arousable    Pulmonary Status:   O2 Device: Nasal cannula (08/17/17 1837)   Adequate oxygenation and airway patent    Complications related to anesthesia: None    Post-anesthesia assessment completed.  No concerns    Signed By: Joanne Torrez MD     August 17, 2017

## 2017-08-18 PROCEDURE — 65270000029 HC RM PRIVATE

## 2017-08-18 PROCEDURE — 74011250636 HC RX REV CODE- 250/636: Performed by: NEUROLOGICAL SURGERY

## 2017-08-18 PROCEDURE — 97161 PT EVAL LOW COMPLEX 20 MIN: CPT

## 2017-08-18 PROCEDURE — 97530 THERAPEUTIC ACTIVITIES: CPT

## 2017-08-18 PROCEDURE — 74011250637 HC RX REV CODE- 250/637: Performed by: NEUROLOGICAL SURGERY

## 2017-08-18 RX ADMIN — HEPARIN SODIUM 5000 UNITS: 5000 INJECTION, SOLUTION INTRAVENOUS; SUBCUTANEOUS at 19:09

## 2017-08-18 RX ADMIN — STANDARDIZED SENNA CONCENTRATE AND DOCUSATE SODIUM 2 TABLET: 8.6; 5 TABLET, FILM COATED ORAL at 08:49

## 2017-08-18 RX ADMIN — Medication 10 ML: at 19:10

## 2017-08-18 RX ADMIN — OXYCODONE HYDROCHLORIDE AND ACETAMINOPHEN 2 TABLET: 5; 325 TABLET ORAL at 08:56

## 2017-08-18 RX ADMIN — MORPHINE SULFATE 10 MG: 10 INJECTION INTRAMUSCULAR; INTRAVENOUS; SUBCUTANEOUS at 03:40

## 2017-08-18 RX ADMIN — HEPARIN SODIUM 5000 UNITS: 5000 INJECTION, SOLUTION INTRAVENOUS; SUBCUTANEOUS at 08:49

## 2017-08-18 RX ADMIN — ONDANSETRON 4 MG: 2 INJECTION INTRAMUSCULAR; INTRAVENOUS at 08:56

## 2017-08-18 RX ADMIN — Medication 10 ML: at 08:49

## 2017-08-18 RX ADMIN — DEXTROSE MONOHYDRATE, SODIUM CHLORIDE, AND POTASSIUM CHLORIDE 75 ML/HR: 50; 4.5; 1.49 INJECTION, SOLUTION INTRAVENOUS at 08:50

## 2017-08-18 RX ADMIN — OXYCODONE HYDROCHLORIDE AND ACETAMINOPHEN 2 TABLET: 5; 325 TABLET ORAL at 19:09

## 2017-08-18 RX ADMIN — OXYCODONE HYDROCHLORIDE AND ACETAMINOPHEN 2 TABLET: 5; 325 TABLET ORAL at 13:19

## 2017-08-18 NOTE — PROGRESS NOTES
Post op interview completed. Complains of a good bit of pain still with moving and coughing. Pain meds have continued as ordered, pt states. No uncontrolled nausea, vomiting. No anes issues at this time.

## 2017-08-18 NOTE — PROGRESS NOTES
Problem: Mobility Impaired (Adult and Pediatric)  Goal: *Acute Goals and Plan of Care (Insert Text)  Discharge Goals:  (1.)Ms. Hamilton will move from supine to sit and sit to supine , scoot up and down and roll side to side with INDEPENDENT within 7 day(s). (2.)Ms. Hamilton will transfer from bed to chair and chair to bed with MODIFIED INDEPENDENCE using the least restrictive device within 7 day(s). (3.)Ms. Hamilton will ambulate with SUPERVISION for 500+ feet with the least restrictive device within 7 day(s). ________________________________________________________________________________________________      PHYSICAL THERAPY: Initial Assessment, Treatment Day: Day of Assessment and PM 8/18/2017  INPATIENT: Hospital Day: 2  [place]or:  [place] / [place]: [place] [employer] / Product Type: Dorinda Foster /      NAME/AGE/GENDER: Ezequiel Rodriguez is a 64 y.o. female   PRIMARY DIAGNOSIS: Lumbar spinal stenosis [M48.06] <principal problem not specified> <principal problem not specified>  Procedure(s) (LRB):  LEFT L2-3 OPEN TLIF WITH REVISION OF HARDWARE (Left)  1 Day Post-Op  ICD-10: Treatment Diagnosis:       · Generalized Muscle Weakness (M62.81)  · Difficulty in walking, Not elsewhere classified (R26.2)  · History of falling (Z91.81)   Precaution/Allergies:  Levaquin [levofloxacin]; Lyrica [pregabalin]; and Sulfa (sulfonamide antibiotics)       ASSESSMENT:      Ms. Kvng See is supine in bed stating she just returned to bed from chair prior to PT entering room and would like to return to bed at end of session. Pt reports 6/10 back pain prior to activity. Pt is CGA-SBA with transition supine to sit EOB with good log roll technique no cues needed this afternoon. Pt is CGA-SBA with sit to stand and pt able to don brace independently with PT support in standing. Pt very shaky up on feet this afternoon when ambulating. Pt ambulated 10 ft in room with CGA-SBA.  Pt declined furthering gait distance secondary to pain increasing to 9/10. Pt returned to supine in bed with SBA again with good log roll technique. Pt left supine in bed with all needs met and within reach. Pt furthered gait distance this session but activity tolerance continues to limited secondary to pain. PT notified nursing of pt pain levels at end of session. This section established at most recent assessment   PROBLEM LIST (Impairments causing functional limitations):  1. Decreased Strength  2. Decreased ADL/Functional Activities  3. Decreased Transfer Abilities  4. Decreased Ambulation Ability/Technique  5. Decreased Balance  6. Increased Pain  7. Decreased Activity Tolerance  8. Decreased Pacing Skills  9. Increased Fatigue  10. Decreased Knowledge of Precautions  11. Decreased Knott with Home Exercise Program    INTERVENTIONS PLANNED: (Benefits and precautions of physical therapy have been discussed with the patient.)  1. Balance Exercise  2. Bed Mobility  3. Family Education  4. Gait Training  5. Home Exercise Program (HEP)  6. Neuromuscular Re-education/Strengthening  7. Range of Motion (ROM)  8. Therapeutic Activites  9. Therapeutic Exercise/Strengthening  10. Transfer Training  11. Group Therapy      TREATMENT PLAN: Frequency/Duration: twice daily for duration of hospital stay  Rehabilitation Potential For Stated Goals: GOOD      RECOMMENDED REHABILITATION/EQUIPMENT: (at time of discharge pending progress): Continue Skilled Therapy and Home Health: Physical Therapy. HISTORY:   History of Present Injury/Illness (Reason for Referral):  S/p LEFT L2-3 OPEN TLIF WITH REVISION OF HARDWARE  Past Medical History/Comorbidities:   Ms. Cammy Krishnamurthy  has a past medical history of Anxiety; Arthritis; Atypical ductal hyperplasia of right breast (12/12/2016); Cancer ([address] Carpal tunnel syndrome; Chronic obstructive pulmonary disease ([address]  Chronic pain; Claustrophobia; DDD (degenerative disc disease), lumbar; Ductal carcinoma in situ (DCIS) of right breast (1/17/2017); History of cigar smoking; History of peptic ulcer; History of URI (upper respiratory infection); History of vitamin D deficiency; Insomnia; Lateral epicondylitis  of elbow; Lumbago; Lumbar radiculopathy; Lumbar stenosis with neurogenic claudication (6/11/2015); Memory difficulty; Mixed hyperlipidemia; Myopathy; Neuroma of foot; Nicotine vapor product user; Obesity (BMI 30.0-34.9); Other hyperalimentation; Papule; Poor historian; Pure hypercholesterolemia; Recurrent major depressive disorder, in full remission ([facility] (04/27/2016); Se[facility]ure disorder [facility] Smoker; and Tobacco abuse. She also has no past medical history of Adverse effect of anesthesia; Difficult intubation; Malignant hyperthermia due to anesthesia; Nausea & vomiting; or Pseudocholinesterase deficiency. Ms. Arlander Fabry  has a past surgical history that includes cyst removal (Right); carpal tunnel release (Bilateral); colonoscopy (04/2010); marcella biopsy breast stereotactic (Right, 11/28/2016); bunionectomy (Left); tubal ligation; breast biopsy (Bilateral); breast lumpectomy (Right, 1/6/2017); breast biopsy (Right, 1/6/2017); heent; breast lumpectomy (Right, 2/16/2017); back surgery (6/15 latest); and back surgery (11/2016). Social History/Living Environment:   Home Environment: Private residence  # Steps to Enter: 1  Rails to Enter: No  One/Two Story Residence: One story  Living Alone: No  Support Systems: Spouse/Significant Other/Partner  Patient Expects to be Discharged to[de-identified] Private residence  Current DME Used/Available at Home: Wheelchair, [address], straight  Tub or Shower Type: Shower  Prior Level of Function/Work/Activity:  Lives with , indep with household gait and ADLs, 3 falls, drives, gait distance limited secondary to pain prior to surgery.    Number of Personal Factors/Comorbidities that affect the Plan of Care: 3+: HIGH COMPLEXITY   EXAMINATION:   Most Recent Physical Functioning:   Gross Assessment:  AROM: Generally decreased, functional  Strength: Generally decreased, functional  Coordination: Generally decreased, functional  Sensation: Intact               Posture:     Balance:  Sitting: Intact; Without support  Standing: Impaired;Pull to stand; With support  Standing - Static: Good;Constant support  Standing - Dynamic : Fair Bed Mobility:  Supine to Sit: Contact guard assistance;Stand-by asssistance  Sit to Supine: Contact guard assistance;Stand-by asssistance  Wheelchair Mobility:     Transfers:  Sit to Stand: Contact guard assistance;Stand-by asssistance  Stand to Sit: Contact guard assistance;Stand-by asssistance  Gait:     Base of Support: Narrowed  Speed/Yessica: Slow  Step Length: Left shortened;Right shortened  Gait Abnormalities: Decreased step clearance;Trunk sway increased  Distance (ft): 10 Feet (ft)  Assistive Device: Walker, rolling  Ambulation - Level of Assistance: Contact guard assistance  Interventions: Safety awareness training; Tactile cues; Verbal cues       Body Structures Involved:  1. Nerves  2. Heart  3. Lungs  4. Bones  5. Joints  6. Muscles  7. Ligaments Body Functions Affected:  1. Sensory/Pain  2. Cardio  3. Respiratory  4. Neuromusculoskeletal  5. Movement Related Activities and Participation Affected:  1. General Tasks and Demands  2. Mobility  3. Self Care  4. Domestic Life  5. Interpersonal Interactions and Relationships  6. Community, Social and [place]ifton Fowler   Number of elements that affect the Plan of Care: 4+: HIGH COMPLEXITY   CLINICAL PRESENTATION:   Presentation: Stable and uncomplicated: LOW COMPLEXITY   CLINICAL DECISION MAKIN [facility] Mobility Inpatient Short Form  How much difficulty does the patient currently have. .. Unable A Lot A Little None   1. Turning over in bed (including adjusting bedclothes, sheets and blankets)? [ ] 1   [ ] 2   [X] 3   [ ] 4   2.   Sitting down on and standing up from a chair with arms ( e.g., wheelchair, bedside commode, etc.)   [ ] 1   [ ] 2   [X] 3   [ ] 4   3. Moving from lying on back to sitting on the side of the bed? [ ] 1   [ ] 2   [X] 3   [ ] 4   How much help from another person does the patient currently need. .. Total A Lot A Little None   4. Moving to and from a bed to a chair (including a wheelchair)? [ ] 1   [ ] 2   [X] 3   [ ] 4   5. Need to walk in hospital room? [ ] 1   [X] 2   [ ] 3   [ ] 4   6. Climbing 3-5 steps with a railing? [ ] 1   [X] 2   [ ] 3   [ ] 4   © 2007, Trustees of [address] [place] [address] [address] [address], under license to [employer]. All rights reserved    Score:  Initial: 16 Most Recent: X (Date: -- )     Interpretation of Tool:  Represents activities that are increasingly more difficult (i.e. Bed mobility, Transfers, Gait). Score 24 23 22-20 19-15 14-10 9-7 6       Modifier CH CI CJ CK CL CM CN         · Mobility - Walking and Moving Around:               - CURRENT STATUS:    CK - 40%-59% impaired, limited or restricted               - GOAL STATUS:           CJ - 20%-39% impaired, limited or restricted               - D/C STATUS:                       ---------------To be determined---------------  Payor: [employer] / Plan: [place] [employer] / Product Type:  /       Medical Necessity:     · Patient is expected to demonstrate progress in strength, range of motion, balance, coordination and functional technique to decrease assistance required with gait, transfers, and functional mobility. Reason for Services/Other Comments:  · Patient continues to require skilled intervention due to decreased strength, decreased balance, decreased functional tolerance, decreased cardiopulmonary endurance affecting participation in basic ADLs and functional tasks.    Use of outcome tool(s) and clinical judgement create a POC that gives a: Clear prediction of patient's progress: LOW COMPLEXITY                 TREATMENT:   (In addition to Assessment/Re-Assessment sessions the following treatments were rendered)   Pre-treatment Symptoms/Complaints:  Back pain  Pain: Initial:   Pain Intensity 1: 6  Pain Location 1: Back  Post Session:  Increased 9/10 with mobility      Therapeutic Activity: (    13 minutes): Therapeutic activities including Bed transfers and Ambulation on level ground to improve mobility, strength, balance and coordination. Required minimal Safety awareness training; Tactile cues; Verbal cues to promote dynamic balance in standing. Braces/Orthotics/Lines/Etc:   · IV  · edwards catheter  · drain MYCHAL   Treatment/Session Assessment:    · Response to Treatment:  Ambulated 10 ft with RW and CGA-SBA. Distance continues to be limited by pain this session. · Interdisciplinary Collaboration:  · Physical Therapist  · Registered Nurse  · After treatment position/precautions:  · Supine in bed, Bed/Chair-wheels locked, Bed in low position, Call light within reach, RN notified and Family at bedside  · Compliance with Program/Exercises: Will assess as treatment progresses. · Recommendations/Intent for next treatment session: \"Next visit will focus on advancements to more challenging activities and reduction in assistance provided\".   Total Treatment Duration:PT Patient Time In/Time Out  Time In: 8380  Time Out: 245 [facility]

## 2017-08-18 NOTE — PROGRESS NOTES
TRANSFER - IN REPORT:    Verbal report received from CAMILA Lennon(name) on Anum Kelleylow  being received from PACU(unit) for routine progression of care      Report consisted of patients Situation, Background, Assessment and   Recommendations(SBAR). Information from the following report(s) Kardex was reviewed with the receiving nurse. Opportunity for questions and clarification was provided. Assessment completed upon patients arrival to unit and care assumed.

## 2017-08-18 NOTE — PROGRESS NOTES
Pt doing well, no complaints. No N/V. Hyman in, not yet mobilizing. AFVSS  Neuro intact  Wound OK  MYCHAL 75cc since OR    Stable, cont MYCHAL. Remove Hyman and mobilize.

## 2017-08-18 NOTE — PROGRESS NOTES
Primary Nurse Clementina Thomas RN and Hernandez Lau RN performed a dual skin assessment on this patient Impairment noted- see wound doc flow sheet  Jordan score is 21

## 2017-08-18 NOTE — PERIOP NOTES
TRANSFER - OUT REPORT:    Verbal report given to Vale WRIGHT(name) on Norman Hoffamn  being transferred to 7th floor(unit) for routine post - op       Report consisted of patients Situation, Background, Assessment and   Recommendations(SBAR). Information from the following report(s) SBAR, Kardex, OR Summary, Intake/Output and MAR was reviewed with the receiving nurse. Lines:   Peripheral IV 06/11/15 Left Hand (Active)       Peripheral IV 06/11/15 Right Hand (Active)       Peripheral IV 08/17/17 Right Hand (Active)   Site Assessment Clean, dry, & intact 8/17/2017 11:39 AM   Phlebitis Assessment 0 8/17/2017 11:39 AM   Infiltration Assessment 0 8/17/2017 11:39 AM   Dressing Status Clean, dry, & intact 8/17/2017 11:39 AM   Dressing Type Tape;Transparent 8/17/2017 11:39 AM   Hub Color/Line Status Infusing;Patent 8/17/2017 11:39 AM       Arterial Line 08/17/17 Right Radial artery (Active)        Opportunity for questions and clarification was provided. Patient transported with:   O2 @ 2 liters    VTE prophylaxis orders have been written for Norman Hoffman.    Patient and family given floor number and nurses name. Family updated re: pt status after security code verified.

## 2017-08-18 NOTE — PROGRESS NOTES
Problem: Mobility Impaired (Adult and Pediatric)  Goal: *Acute Goals and Plan of Care (Insert Text)  Discharge Goals:  (1.)Ms. Hamilton will move from supine to sit and sit to supine , scoot up and down and roll side to side with INDEPENDENT within 7 day(s). (2.)Ms. Hamilton will transfer from bed to chair and chair to bed with MODIFIED INDEPENDENCE using the least restrictive device within 7 day(s). (3.)Ms. Hamilton will ambulate with SUPERVISION for 500+ feet with the least restrictive device within 7 day(s). ________________________________________________________________________________________________      PHYSICAL THERAPY: INITIAL ASSESSMENT, TREATMENT DAY: DAY OF ASSESSMENT, AM 8/18/2017  INPATIENT: Hospital Day: 2  Payor: Fernando Rolle / Plan: [place]  [facility] / Product Type: Andreia Garrison /      NAME/AGE/GENDER: Demetris King is a 64 y.o. female   PRIMARY DIAGNOSIS: Lumbar spinal stenosis [M48.06] <principal problem not specified> <principal problem not specified>  Procedure(s) (LRB):  LEFT L2-3 OPEN TLIF WITH REVISION OF HARDWARE (Left)  1 Day Post-Op  ICD-10: Treatment Diagnosis:       · Generalized Muscle Weakness (M62.81)  · Difficulty in walking, Not elsewhere classified (R26.2)  · History of falling (Z91.81)   Precaution/Allergies:  Levaquin [levofloxacin]; Lyrica [pregabalin]; and Sulfa (sulfonamide antibiotics)       ASSESSMENT:      Ms. Eugenie Hutchins is supine in bed upon contact and agreeable to PT evaluation this morning. Pt reports 10/10 back pain prior to activity and states she recently received medication. Pt reports she is coughing due to allergies and that is increasing her back pain. Pt reports living in 1 story home with her  with 1 step to enter. Pt reports independence with gait and ADLs at baseline, drives, and approximately 3 falls in past 6 months. Pt reports her falls are secondary to seizures and back pain.  She reports her gait distance was very limited secondary to back pain requiring her to use wheelchair for community distances. PT reviewed spinal precautions and log roll technique prior to mobility. Pt verbalized understanding and stated this is her 5th back surgery. Pt is CGA with supine to sit and demonstrates good log roll technique with minimal VC. Pt performed sit to stand with CGA to RW and required assist with donning brace. Pt ambulated 3 ft to straight back chair with CGA and RW. Pt returned to sitting and left sitting up in straight back chair. Pt tearful throughout mobility this session with reports secondary to pain and unable to tolerate ambulating further this session. Hopeful to increased gait distance in afternoon session. Chrissy Martin will benefit from skilled PT (medically necessary) to address decreased strength, decreased balance, decreased functional tolerance, decreased cardiopulmonary endurance affecting participation in basic ADLs and functional tasks. This section established at most recent assessment   PROBLEM LIST (Impairments causing functional limitations):  1. Decreased Strength  2. Decreased ADL/Functional Activities  3. Decreased Transfer Abilities  4. Decreased Ambulation Ability/Technique  5. Decreased Balance  6. Increased Pain  7. Decreased Activity Tolerance  8. Decreased Pacing Skills  9. Increased Fatigue  10. Decreased Knowledge of Precautions  11. Decreased [place] with Home Exercise Program    INTERVENTIONS PLANNED: (Benefits and precautions of physical therapy have been discussed with the patient.)  1. Balance Exercise  2. Bed Mobility  3. Family Education  4. Gait Training  5. Home Exercise Program (HEP)  6. Neuromuscular Re-education/Strengthening  7. Range of Motion (ROM)  8. Therapeutic Activites  9. Therapeutic Exercise/Strengthening  10. Transfer Training  11.  Group Therapy      TREATMENT PLAN: Frequency/Duration: twice daily for duration of hospital stay  Rehabilitation Potential For Stated Goals: GOOD RECOMMENDED REHABILITATION/EQUIPMENT: (at time of discharge pending progress): Continue Skilled Therapy and Home Health: Physical Therapy. HISTORY:   History of Present Injury/Illness (Reason for Referral):  S/p LEFT L2-3 OPEN TLIF WITH REVISION OF HARDWARE  Past Medical History/Comorbidities:   Ms. Tony Hoffman  has a past medical history of Anxiety; Arthritis; Atypical ductal hyperplasia of right breast (12/12/2016); Cancer ([facility] [address] Carpal tunnel syndrome; Chronic obstructive pulmonary disease ([address] Chronic pain; Claustrophobia; DDD (degenerative disc disease), lumbar; Ductal carcinoma in situ (DCIS) of right breast (1/17/2017); History of cigar smoking; History of peptic ulcer; History of URI (upper respiratory infection); History of vitamin D deficiency; Insomnia; Lateral epicondylitis  of elbow; Lumbago; Lumbar radiculopathy; Lumbar stenosis with neurogenic claudication (6/11/2015); Memory difficulty; Mixed hyperlipidemia; Myopathy; Neuroma of foot; Nicotine vapor product user; Obesity (BMI 30.0-34.9); Other hyperalimentation; Papule; Poor historian; Pure hypercholesterolemia; Recurrent major depressive disorder, in full remission ([address] (04/27/2016); Seizure disorder ([address] Smoker; and Tobacco abuse. She also has no past medical history of Adverse effect of anesthesia; Difficult intubation; Malignant hyperthermia due to anesthesia; Nausea & vomiting; or Pseudocholinesterase deficiency. Ms. Tony Hoffman  has a past surgical history that includes cyst removal (Right); carpal tunnel release (Bilateral); colonoscopy (04/2010); marcella biopsy breast stereotactic (Right, 11/28/2016); bunionectomy (Left); tubal ligation; breast biopsy (Bilateral); breast lumpectomy (Right, 1/6/2017); breast biopsy (Right, 1/6/2017); heent; breast lumpectomy (Right, 2/16/2017); back surgery (6/15 latest); and back surgery (11/2016).   Social History/Living Environment:   Home Environment: Private residence  # Steps to Enter: 1  Rails to Enter: No  One/Two Story Residence: One story  Living Alone: No  Support Systems: Spouse/Significant Other/Partner  Patient Expects to be Discharged to[de-identified] Private residence  Current DME Used/Available at Home: Wheelchair, Walker, [place], straight  Tub or Shower Type: Shower  Prior Level of Function/Work/Activity:  Lives with , indep with household gait and ADLs, 3 falls, drives, gait distance limited secondary to pain prior to surgery. Number of Personal Factors/Comorbidities that affect the Plan of Care: 3+: HIGH COMPLEXITY   EXAMINATION:   Most Recent Physical Functioning:   Gross Assessment:  AROM: Generally decreased, functional  Strength: Generally decreased, functional  Coordination: Generally decreased, functional  Sensation: Intact               Posture:     Balance:  Sitting: Intact; Without support  Standing: Impaired;Pull to stand; With support Bed Mobility:  Supine to Sit: Contact guard assistance  Wheelchair Mobility:     Transfers:  Sit to Stand: Contact guard assistance  Stand to Sit: Contact guard assistance  Gait:     Base of Support: Narrowed  Speed/Yessica: Slow  Step Length: Left shortened;Right shortened  Gait Abnormalities: Decreased step clearance  Distance (ft): 3 Feet (ft)  Assistive Device: Walker, rolling  Ambulation - Level of Assistance: Contact guard assistance  Interventions: Safety awareness training; Tactile cues; Verbal cues       Body Structures Involved:  1. Nerves  2. Heart  3. Lungs  4. Bones  5. Joints  6. Muscles  7. Ligaments Body Functions Affected:  1. Sensory/Pain  2. Cardio  3. Respiratory  4. Neuromusculoskeletal  5. Movement Related Activities and Participation Affected:  1. General Tasks and Demands  2. Mobility  3. Self Care  4. Domestic Life  5. Interpersonal Interactions and Relationships  6.  Community, Social and [place]   Number of elements that affect the Plan of Care: 4+: HIGH COMPLEXITY   CLINICAL PRESENTATION:   Presentation: Stable and uncomplicated: LOW COMPLEXITY   CLINICAL DECISION MAKIN[address] [place] [id number] AM-PAC 6 Clicks   Basic Mobility Inpatient Short Form  How much difficulty does the patient currently have. .. Unable A Lot A Little None   1. Turning over in bed (including adjusting bedclothes, sheets and blankets)? [ ] 1   [ ] 2   [X] 3   [ ] 4   2. Sitting down on and standing up from a chair with arms ( e.g., wheelchair, bedside commode, etc.)   [ ] 1   [ ] 2   [X] 3   [ ] 4   3. Moving from lying on back to sitting on the side of the bed? [ ] 1   [ ] 2   [X] 3   [ ] 4   How much help from another person does the patient currently need. .. Total A Lot A Little None   4. Moving to and from a bed to a chair (including a wheelchair)? [ ] 1   [ ] 2   [X] 3   [ ] 4   5. Need to walk in hospital room? [ ] 1   [X] 2   [ ] 3   [ ] 4   6. Climbing 3-5 steps with a railing? [ ] 1   [X] 2   [ ] 3   [ ] 4   © 2007, Trustees of [address] [place] [id number], under license to [employer]. All rights reserved    Score:  Initial: 16 Most Recent: X (Date: -- )     Interpretation of Tool:  Represents activities that are increasingly more difficult (i.e. Bed mobility, Transfers, Gait). Score 24 23 22-20 19-15 14-10 9-7 6       Modifier CH CI CJ CK CL CM CN         · Mobility - Walking and Moving Around:               - CURRENT STATUS:    CK - 40%-59% impaired, limited or restricted               - GOAL STATUS:           CJ - 20%-39% impaired, limited or restricted               - D/C STATUS:                       ---------------To be determined---------------  Payor: [employer] / Plan: [place] [facility] / Product Type:  /       Medical Necessity:     · Patient is expected to demonstrate progress in strength, range of motion, balance, coordination and functional technique to decrease assistance required with gait, transfers, and functional mobility.   Reason for Services/Other Comments:  · Patient continues to require skilled intervention due to decreased strength, decreased balance, decreased functional tolerance, decreased cardiopulmonary endurance affecting participation in basic ADLs and functional tasks. Use of outcome tool(s) and clinical judgement create a POC that gives a: Clear prediction of patient's progress: LOW COMPLEXITY                 TREATMENT:   (In addition to Assessment/Re-Assessment sessions the following treatments were rendered)   Pre-treatment Symptoms/Complaints:  Back pain, increased with coughing  Pain: Initial:   Pain Intensity 1: 10  Pain Location 1: Back  Post Session:  Unchanged      Assessment/Reassessment only, no treatment provided today     Braces/Orthotics/Lines/Etc:   · IV  · edwards catheter  · drain MYCHAL   Treatment/Session Assessment:    · Response to Treatment:  Ambulated 3 ft with RW and CGA. Distance limited by pain this session. · Interdisciplinary Collaboration:  · Physical Therapist  · Registered Nurse  · After treatment position/precautions:  · Up in chair  · Bed/Chair-wheels locked  · Bed in low position  · Call light within reach  · RN notified  · Compliance with Program/Exercises: Will assess as treatment progresses. · Recommendations/Intent for next treatment session: \"Next visit will focus on advancements to more challenging activities and reduction in assistance provided\".   Total Treatment Duration:  PT Patient Time In/Time Out  Time In: 0906  Time Out: [address]

## 2017-08-19 PROCEDURE — 97530 THERAPEUTIC ACTIVITIES: CPT

## 2017-08-19 PROCEDURE — 65270000029 HC RM PRIVATE

## 2017-08-19 PROCEDURE — 74011250637 HC RX REV CODE- 250/637: Performed by: NEUROLOGICAL SURGERY

## 2017-08-19 PROCEDURE — 74011250636 HC RX REV CODE- 250/636: Performed by: NEUROLOGICAL SURGERY

## 2017-08-19 RX ORDER — ESCITALOPRAM OXALATE 10 MG/1
20 TABLET ORAL DAILY
Status: DISCONTINUED | OUTPATIENT
Start: 2017-08-19 | End: 2017-08-20 | Stop reason: HOSPADM

## 2017-08-19 RX ORDER — LEVETIRACETAM 500 MG/1
500 TABLET ORAL 2 TIMES DAILY
Status: DISCONTINUED | OUTPATIENT
Start: 2017-08-19 | End: 2017-08-20 | Stop reason: HOSPADM

## 2017-08-19 RX ADMIN — MORPHINE SULFATE 10 MG: 10 INJECTION INTRAMUSCULAR; INTRAVENOUS; SUBCUTANEOUS at 13:29

## 2017-08-19 RX ADMIN — Medication 10 ML: at 06:48

## 2017-08-19 RX ADMIN — HEPARIN SODIUM 5000 UNITS: 5000 INJECTION, SOLUTION INTRAVENOUS; SUBCUTANEOUS at 08:33

## 2017-08-19 RX ADMIN — ONDANSETRON 4 MG: 2 INJECTION INTRAMUSCULAR; INTRAVENOUS at 13:29

## 2017-08-19 RX ADMIN — LEVETIRACETAM 500 MG: 500 TABLET ORAL at 08:33

## 2017-08-19 RX ADMIN — LEVETIRACETAM 500 MG: 500 TABLET ORAL at 16:17

## 2017-08-19 RX ADMIN — OXYCODONE HYDROCHLORIDE AND ACETAMINOPHEN 2 TABLET: 5; 325 TABLET ORAL at 16:17

## 2017-08-19 RX ADMIN — Medication 10 ML: at 16:20

## 2017-08-19 RX ADMIN — HEPARIN SODIUM 5000 UNITS: 5000 INJECTION, SOLUTION INTRAVENOUS; SUBCUTANEOUS at 21:28

## 2017-08-19 RX ADMIN — Medication 10 ML: at 22:00

## 2017-08-19 RX ADMIN — OXYCODONE HYDROCHLORIDE AND ACETAMINOPHEN 2 TABLET: 5; 325 TABLET ORAL at 06:48

## 2017-08-19 RX ADMIN — OXYCODONE HYDROCHLORIDE AND ACETAMINOPHEN 2 TABLET: 5; 325 TABLET ORAL at 00:53

## 2017-08-19 RX ADMIN — OXYCODONE HYDROCHLORIDE AND ACETAMINOPHEN 2 TABLET: 5; 325 TABLET ORAL at 21:28

## 2017-08-19 RX ADMIN — STANDARDIZED SENNA CONCENTRATE AND DOCUSATE SODIUM 2 TABLET: 8.6; 5 TABLET, FILM COATED ORAL at 08:33

## 2017-08-19 RX ADMIN — ESCITALOPRAM OXALATE 20 MG: 10 TABLET ORAL at 08:33

## 2017-08-19 NOTE — PROGRESS NOTES
Problem: Mobility Impaired (Adult and Pediatric)  Goal: *Acute Goals and Plan of Care (Insert Text)  Discharge Goals:  (1.)Ms. Hamilton will move from supine to sit and sit to supine , scoot up and down and roll side to side with INDEPENDENT within 7 day(s). (2.)Ms. Hamilton will transfer from bed to chair and chair to bed with MODIFIED INDEPENDENCE using the least restrictive device within 7 day(s). (3.)Ms. Hamilton will ambulate with SUPERVISION for 500+ feet with the least restrictive device within 7 day(s). ________________________________________________________________________________________________      PHYSICAL THERAPY: Daily Note, Treatment Day: 1st and AM 8/19/2017  INPATIENT: Hospital Day: 3  Payor: Khalif Concepcion / Plan: [facility] / Product Type: Patsy Wilson /      NAME/AGE/GENDER: Quinton Mitchell is a 64 y.o. female   PRIMARY DIAGNOSIS: Lumbar spinal stenosis [M48.06] <principal problem not specified> <principal problem not specified>  Procedure(s) (LRB):  LEFT L2-3 OPEN TLIF WITH REVISION OF HARDWARE (Left)  2 Days Post-Op  ICD-10: Treatment Diagnosis:       · Generalized Muscle Weakness (M62.81)  · Difficulty in walking, Not elsewhere classified (R26.2)  · History of falling (Z91.81)   Precaution/Allergies:  Levaquin [levofloxacin]; Lyrica [pregabalin]; and Sulfa (sulfonamide antibiotics)       ASSESSMENT:      Ms. Fifi Young is supine in bed on contact and agreeable to therapy. Pt performed supine to sit with CGA/SBA. Pt donned brace with set up and was able to increase her ambulation to 340' using rolling walker and CGA. Pt with improved ambulation, however does have a little shaky movement in her knees during ambulation. Pt reports she has this shakiness each time she has surgery but it resolves as she heals. Pt returned to room and was left up in chair with needs in reach. Pt is making good progress and hopes to go home tomorrow. Will continue with POC.    This section established at most recent assessment   PROBLEM LIST (Impairments causing functional limitations):  1. Decreased Strength  2. Decreased ADL/Functional Activities  3. Decreased Transfer Abilities  4. Decreased Ambulation Ability/Technique  5. Decreased Balance  6. Increased Pain  7. Decreased Activity Tolerance  8. Decreased Pacing Skills  9. Increased Fatigue  10. Decreased Knowledge of Precautions  11. Decreased [place] with Home Exercise Program    INTERVENTIONS PLANNED: (Benefits and precautions of physical therapy have been discussed with the patient.)  1. Balance Exercise  2. Bed Mobility  3. Family Education  4. Gait Training  5. Home Exercise Program (HEP)  6. Neuromuscular Re-education/Strengthening  7. Range of Motion (ROM)  8. Therapeutic Activites  9. Therapeutic Exercise/Strengthening  10. Transfer Training  11. Group Therapy      TREATMENT PLAN: Frequency/Duration: twice daily for duration of hospital stay  Rehabilitation Potential For Stated Goals: GOOD      RECOMMENDED REHABILITATION/EQUIPMENT: (at time of discharge pending progress): Continue Skilled Therapy and Home Health: Physical Therapy. HISTORY:   History of Present Injury/Illness (Reason for Referral):  S/p LEFT L2-3 OPEN TLIF WITH REVISION OF HARDWARE  Past Medical History/Comorbidities:   Ms. Skye Blevins  has a past medical history of Anxiety; Arthritis; Atypical ductal hyperplasia of right breast (12/12/2016); Cancer ([address] Carpal tunnel syndrome; Chronic obstructive pulmonary disease ([address] Chronic pain; Claustrophobia; DDD (degenerative disc disease), lumbar; Ductal carcinoma in situ (DCIS) of right breast (1/17/2017); History of cigar smoking; History of peptic ulcer; History of URI (upper respiratory infection); History of vitamin D deficiency; Insomnia; Lateral epicondylitis  of elbow; Lumbago; Lumbar radiculopathy; Lumbar stenosis with neurogenic claudication (6/11/2015); Memory difficulty;  Mixed hyperlipidemia; Myopathy; Neuroma of foot; Nicotine vapor product user; Obesity (BMI 30.0-34.9); Other hyperalimentation; Papule; Poor historian; Pure hypercholesterolemia; Recurrent major depressive disorder, in full remission ([facility] [address] (04/27/2016); Seizure disorder ([facility] Smoker; and Tobacco abuse. She also has no past medical history of Adverse effect of anesthesia; Difficult intubation; Malignant hyperthermia due to anesthesia; Nausea & vomiting; or Pseudocholinesterase deficiency. Ms. Salvador Martin  has a past surgical history that includes cyst removal (Right); carpal tunnel release (Bilateral); colonoscopy (04/2010); marcella biopsy breast stereotactic (Right, 11/28/2016); bunionectomy (Left); tubal ligation; breast biopsy (Bilateral); breast lumpectomy (Right, 1/6/2017); breast biopsy (Right, 1/6/2017); heent; breast lumpectomy (Right, 2/16/2017); back surgery (6/15 latest); and back surgery (11/2016). Social History/Living Environment:   Home Environment: Private residence  # Steps to Enter: 1  Rails to Enter: No  One/Two Story Residence: One story  Living Alone: No  Support Systems: Spouse/Significant Other/Partner  Patient Expects to be Discharged to[de-identified] Private residence  Current DME Used/Available at Home: Wheelchair, [address], straight  Tub or Shower Type: Shower  Prior Level of Function/Work/Activity:  Lives with , indep with household gait and ADLs, 3 falls, drives, gait distance limited secondary to pain prior to surgery. Number of Personal Factors/Comorbidities that affect the Plan of Care: 3+: HIGH COMPLEXITY   EXAMINATION:   Most Recent Physical Functioning:   Gross Assessment:                  Posture:     Balance:  Sitting: Intact; With support  Standing - Static: Good;Constant support  Standing - Dynamic : Fair Bed Mobility:  Supine to Sit: Stand-by asssistance; Additional time  Wheelchair Mobility:     Transfers:  Sit to Stand: Stand-by asssistance;Contact guard assistance  Stand to Sit: Stand-by asssistance  Gait:     Base of Support: Narrowed  Speed/Yessica: Pace decreased (<100 feet/min)  Step Length: Left shortened;Right shortened  Gait Abnormalities: Decreased step clearance;Trunk sway increased  Distance (ft): 340 Feet (ft)  Assistive Device: Walker, rolling  Ambulation - Level of Assistance: Contact guard assistance  Interventions: Safety awareness training;Verbal cues       Body Structures Involved:  1. Nerves  2. Heart  3. Lungs  4. Bones  5. Joints  6. Muscles  7. Ligaments Body Functions Affected:  1. Sensory/Pain  2. Cardio  3. Respiratory  4. Neuromusculoskeletal  5. Movement Related Activities and Participation Affected:  1. General Tasks and Demands  2. Mobility  3. Self Care  4. Domestic Life  5. Interpersonal Interactions and Relationships  6. Community, Social and [place]   Number of elements that affect the Plan of Care: 4+: HIGH COMPLEXITY   CLINICAL PRESENTATION:   Presentation: Stable and uncomplicated: LOW COMPLEXITY   CLINICAL DECISION MAKIN [facility] Mobility Inpatient Short Form  How much difficulty does the patient currently have. .. Unable A Lot A Little None   1. Turning over in bed (including adjusting bedclothes, sheets and blankets)? [ ] 1   [ ] 2   [X] 3   [ ] 4   2. Sitting down on and standing up from a chair with arms ( e.g., wheelchair, bedside commode, etc.)   [ ] 1   [ ] 2   [X] 3   [ ] 4   3. Moving from lying on back to sitting on the side of the bed? [ ] 1   [ ] 2   [X] 3   [ ] 4   How much help from another person does the patient currently need. .. Total A Lot A Little None   4. Moving to and from a bed to a chair (including a wheelchair)? [ ] 1   [ ] 2   [X] 3   [ ] 4   5. Need to walk in hospital room? [ ] 1   [X] 2   [ ] 3   [ ] 4   6. Climbing 3-5 steps with a railing? [ ] 1   [X] 2   [ ] 3   [ ] 4   © , Trustees of [address] [place] [address] [address] [address], under license to [employer].  All rights reserved    Score:  Initial: 16 Most Recent: X (Date: -- )     Interpretation of Tool:  Represents activities that are increasingly more difficult (i.e. Bed mobility, Transfers, Gait). Score 24 23 22-20 19-15 14-10 9-7 6       Modifier CH CI CJ CK CL CM CN         · Mobility - Walking and Moving Around:               - CURRENT STATUS:    CK - 40%-59% impaired, limited or restricted               - GOAL STATUS:           CJ - 20%-39% impaired, limited or restricted               - D/C STATUS:                       ---------------To be determined---------------  Payor: [employer] / Plan: [place] [employer] / Product Type:  /       Medical Necessity:     · Patient is expected to demonstrate progress in strength, range of motion, balance, coordination and functional technique to decrease assistance required with gait, transfers, and functional mobility. Reason for Services/Other Comments:  · Patient continues to require skilled intervention due to decreased strength, decreased balance, decreased functional tolerance, decreased cardiopulmonary endurance affecting participation in basic ADLs and functional tasks. Use of outcome tool(s) and clinical judgement create a POC that gives a: Clear prediction of patient's progress: LOW COMPLEXITY                 TREATMENT:      Pre-treatment Symptoms/Complaints:  Back pain  Pain: Initial:      Post Session:  Increased 9/10 with mobility      Therapeutic Activity: (    17 minutes): Therapeutic activities including Bed transfers and Ambulation on level ground to improve mobility, strength, balance and coordination. Required minimal Safety awareness training;Verbal cues to promote dynamic balance in standing. Braces/Orthotics/Lines/Etc:   · IV and drain MYCHAL   Treatment/Session Assessment:    · Response to Treatment:  Increased ambulation this morning.   · Interdisciplinary Collaboration:  · Physical Therapist  · Registered Nurse  · After treatment position/precautions:  · Up in chair, Bed/Chair-wheels locked, Call light within reach and RN notified  · Compliance with Program/Exercises: Will assess as treatment progresses. · Recommendations/Intent for next treatment session: \"Next visit will focus on advancements to more challenging activities and reduction in assistance provided\".   Total Treatment Duration:PT Patient Time In/Time Out  Time In: 0930  Time Out: [address], PTA

## 2017-08-19 NOTE — PROGRESS NOTES
Problem: Mobility Impaired (Adult and Pediatric)  Goal: *Acute Goals and Plan of Care (Insert Text)  Discharge Goals:  (1.)Ms. Hamilton will move from supine to sit and sit to supine , scoot up and down and roll side to side with INDEPENDENT within 7 day(s). (2.)Ms. Hamilton will transfer from bed to chair and chair to bed with MODIFIED INDEPENDENCE using the least restrictive device within 7 day(s). (3.)Ms. Hamilton will ambulate with SUPERVISION for 500+ feet with the least restrictive device within 7 day(s). ________________________________________________________________________________________________      PHYSICAL THERAPY: Daily Note, Treatment Day: 1st and PM 8/19/2017  INPATIENT: Hospital Day: 3  Payor: David / Plan: [facility] / Product Type: [employer] /      NAME/AGE/GENDER: Padmini Medel is a 64 y.o. female   PRIMARY DIAGNOSIS: Lumbar spinal stenosis [M48.06] <principal problem not specified> <principal problem not specified>  Procedure(s) (LRB):  LEFT L2-3 OPEN TLIF WITH REVISION OF HARDWARE (Left)  2 Days Post-Op  ICD-10: Treatment Diagnosis:       · Generalized Muscle Weakness (M62.81)  · Difficulty in walking, Not elsewhere classified (R26.2)  · History of falling (Z91.81)   Precaution/Allergies:  Levaquin [levofloxacin]; Lyrica [pregabalin]; and Sulfa (sulfonamide antibiotics)       ASSESSMENT:      Ms. Skye Blevins is supine in bed on contact and agreeable to therapy. Pt performed supine to sit with CGA/SBA. Pt donned brace with set up and was able to increase her ambulation to 340' using rolling walker and CGA. Pt with improved ambulation, however does have a little shaky movement in her knees during ambulation. Pt reports she has this shakiness each time she has surgery but it resolves as she heals. Pt returned to room and was left up in chair with needs in reach. Pt is making good progress and hopes to go home tomorrow. Will continue with POC.     PM-pt was able to perform supine to sit, stood and ambulated about 370' with rolling walker and CGA with brace on. Pt returned to room and was left up in chair. Pt is making good progress. Will continue with POC. This section established at most recent assessment   PROBLEM LIST (Impairments causing functional limitations):  1. Decreased Strength  2. Decreased ADL/Functional Activities  3. Decreased Transfer Abilities  4. Decreased Ambulation Ability/Technique  5. Decreased Balance  6. Increased Pain  7. Decreased Activity Tolerance  8. Decreased Pacing Skills  9. Increased Fatigue  10. Decreased Knowledge of Precautions  11. Decreased [place] with Home Exercise Program    INTERVENTIONS PLANNED: (Benefits and precautions of physical therapy have been discussed with the patient.)  1. Balance Exercise  2. Bed Mobility  3. Family Education  4. Gait Training  5. Home Exercise Program (HEP)  6. Neuromuscular Re-education/Strengthening  7. Range of Motion (ROM)  8. Therapeutic Activites  9. Therapeutic Exercise/Strengthening  10. Transfer Training  11. Group Therapy      TREATMENT PLAN: Frequency/Duration: twice daily for duration of hospital stay  Rehabilitation Potential For Stated Goals: GOOD      RECOMMENDED REHABILITATION/EQUIPMENT: (at time of discharge pending progress): Continue Skilled Therapy and Home Health: Physical Therapy. HISTORY:   History of Present Injury/Illness (Reason for Referral):  S/p LEFT L2-3 OPEN TLIF WITH REVISION OF HARDWARE  Past Medical History/Comorbidities:   Ms. Clayton Palomares  has a past medical history of Anxiety; Arthritis; Atypical ductal hyperplasia of right breast (12/12/2016); Cancer ([facility] [address] Carpal tunnel syndrome; Chronic obstructive pulmonary disease ([address] Chronic pain; Claustrophobia; DDD (degenerative disc disease), lumbar; Ductal carcinoma in situ (DCIS) of right breast (1/17/2017); History of cigar smoking; History of peptic ulcer;  History of URI (upper respiratory infection); History of vitamin D deficiency; Insomnia; Lateral epicondylitis  of elbow; Lumbago; Lumbar radiculopathy; Lumbar stenosis with neurogenic claudication (6/11/2015); Memory difficulty; Mixed hyperlipidemia; Myopathy; Neuroma of foot; Nicotine vapor product user; Obesity (BMI 30.0-34.9); Other hyperalimentation; Papule; Poor historian; Pure hypercholesterolemia; Recurrent major depressive disorder, in full remission ([facility] [address] (04/27/2016); Seizure disorder ([facility] Smoker; and Tobacco abuse. She also has no past medical history of Adverse effect of anesthesia; Difficult intubation; Malignant hyperthermia due to anesthesia; Nausea & vomiting; or Pseudocholinesterase deficiency. Ms. Cammy Krishnamurthy  has a past surgical history that includes cyst removal (Right); carpal tunnel release (Bilateral); colonoscopy (04/2010); marcella biopsy breast stereotactic (Right, 11/28/2016); bunionectomy (Left); tubal ligation; breast biopsy (Bilateral); breast lumpectomy (Right, 1/6/2017); breast biopsy (Right, 1/6/2017); heent; breast lumpectomy (Right, 2/16/2017); back surgery (6/15 latest); and back surgery (11/2016). Social History/Living Environment:   Home Environment: Private residence  # Steps to Enter: 1  Rails to Enter: No  One/Two Story Residence: One story  Living Alone: No  Support Systems: Spouse/Significant Other/Partner  Patient Expects to be Discharged to[de-identified] Private residence  Current DME Used/Available at Home: Wheelchair, [address], straight  Tub or Shower Type: Shower  Prior Level of Function/Work/Activity:  Lives with , indep with household gait and ADLs, 3 falls, drives, gait distance limited secondary to pain prior to surgery. Number of Personal Factors/Comorbidities that affect the Plan of Care: 3+: HIGH COMPLEXITY   EXAMINATION:   Most Recent Physical Functioning:   Gross Assessment:                  Posture:     Balance:  Sitting: Intact; With support  Standing - Static: Good;Constant support  Standing - Dynamic : Fair Bed Mobility:  Supine to Sit: Stand-by asssistance; Additional time  Wheelchair Mobility:     Transfers:  Sit to Stand: Stand-by asssistance;Contact guard assistance  Stand to Sit: Stand-by asssistance  Gait:     Base of Support: Narrowed  Speed/Yessica: Pace decreased (<100 feet/min)  Step Length: Left shortened;Right shortened  Gait Abnormalities: Decreased step clearance;Trunk sway increased  Distance (ft): 370 Feet (ft)  Assistive Device: Walker, rolling  Ambulation - Level of Assistance: Contact guard assistance  Interventions: Safety awareness training;Verbal cues       Body Structures Involved:  1. Nerves  2. Heart  3. Lungs  4. Bones  5. Joints  6. Muscles  7. Ligaments Body Functions Affected:  1. Sensory/Pain  2. Cardio  3. Respiratory  4. Neuromusculoskeletal  5. Movement Related Activities and Participation Affected:  1. General Tasks and Demands  2. Mobility  3. Self Care  4. Domestic Life  5. Interpersonal Interactions and Relationships  6. Community, Social and [place]   Number of elements that affect the Plan of Care: 4+: HIGH COMPLEXITY   CLINICAL PRESENTATION:   Presentation: Stable and uncomplicated: LOW COMPLEXITY   CLINICAL DECISION MAKIN [facility] Inpatient Short Form  How much difficulty does the patient currently have. .. Unable A Lot A Little None   1. Turning over in bed (including adjusting bedclothes, sheets and blankets)? [ ] 1   [ ] 2   [X] 3   [ ] 4   2. Sitting down on and standing up from a chair with arms ( e.g., wheelchair, bedside commode, etc.)   [ ] 1   [ ] 2   [X] 3   [ ] 4   3. Moving from lying on back to sitting on the side of the bed? [ ] 1   [ ] 2   [X] 3   [ ] 4   How much help from another person does the patient currently need. .. Total A Lot A Little None   4. Moving to and from a bed to a chair (including a wheelchair)? [ ] 1   [ ] 2   [X] 3   [ ] 4   5. Need to walk in hospital room?    [ ] 1   [X] 2   [ ] 3   [ ] 4   6. Climbing 3-5 steps with a railing? [ ] 1   [X] 2   [ ] 3   [ ] 4   © 2007, Trustees of [address] [place] [address] [address] [address], under license to [employer]. All rights reserved    Score:  Initial: 16 Most Recent: X (Date: -- )     Interpretation of Tool:  Represents activities that are increasingly more difficult (i.e. Bed mobility, Transfers, Gait). Score 24 23 22-20 19-15 14-10 9-7 6       Modifier CH CI CJ CK CL CM CN         · Mobility - Walking and Moving Around:               - CURRENT STATUS:    CK - 40%-59% impaired, limited or restricted               - GOAL STATUS:           CJ - 20%-39% impaired, limited or restricted               - D/C STATUS:                       ---------------To be determined---------------  Payor: [employer] / Plan: [place] [employer] / Product Type:  /       Medical Necessity:     · Patient is expected to demonstrate progress in strength, range of motion, balance, coordination and functional technique to decrease assistance required with gait, transfers, and functional mobility. Reason for Services/Other Comments:  · Patient continues to require skilled intervention due to decreased strength, decreased balance, decreased functional tolerance, decreased cardiopulmonary endurance affecting participation in basic ADLs and functional tasks. Use of outcome tool(s) and clinical judgement create a POC that gives a: Clear prediction of patient's progress: LOW COMPLEXITY                 TREATMENT:      Pre-treatment Symptoms/Complaints:  Back pain  Pain: Initial:      Post Session:  Increased 9/10 with mobility      Therapeutic Activity: (    15 minutes): Therapeutic activities including Bed transfers and Ambulation on level ground to improve mobility, strength, balance and coordination. Required minimal Safety awareness training;Verbal cues to promote dynamic balance in standing.        Braces/Orthotics/Lines/Etc:   · IV and drain MYCHAL   Treatment/Session Assessment:    · Response to Treatment:  Increased ambulation   · Interdisciplinary Collaboration:  · Physical Therapy Assistant and Registered Nurse  · After treatment position/precautions:  · Up in chair, Bed/Chair-wheels locked, Call light within reach and RN notified  · Compliance with Program/Exercises: Will assess as treatment progresses. · Recommendations/Intent for next treatment session: \"Next visit will focus on advancements to more challenging activities and reduction in assistance provided\".   Total Treatment Duration:PT Patient Time In/Time Out  Time In: 6016  Time Out: 113[facility]

## 2017-08-20 VITALS
OXYGEN SATURATION: 92 % | WEIGHT: 199 LBS | SYSTOLIC BLOOD PRESSURE: 128 MMHG | TEMPERATURE: 98.4 F | HEIGHT: 65 IN | BODY MASS INDEX: 33.15 KG/M2 | HEART RATE: 89 BPM | RESPIRATION RATE: 16 BRPM | DIASTOLIC BLOOD PRESSURE: 82 MMHG

## 2017-08-20 PROCEDURE — 01NB0ZZ RELEASE LUMBAR NERVE, OPEN APPROACH: ICD-10-PCS | Performed by: NEUROLOGICAL SURGERY

## 2017-08-20 PROCEDURE — 97530 THERAPEUTIC ACTIVITIES: CPT

## 2017-08-20 PROCEDURE — 74011250637 HC RX REV CODE- 250/637: Performed by: NEUROLOGICAL SURGERY

## 2017-08-20 PROCEDURE — 74011250636 HC RX REV CODE- 250/636: Performed by: NEUROLOGICAL SURGERY

## 2017-08-20 PROCEDURE — 0ST20ZZ RESECTION OF LUMBAR VERTEBRAL DISC, OPEN APPROACH: ICD-10-PCS | Performed by: NEUROLOGICAL SURGERY

## 2017-08-20 PROCEDURE — 0SG00AJ FUSION OF LUMBAR VERTEBRAL JOINT WITH INTERBODY FUSION DEVICE, POSTERIOR APPROACH, ANTERIOR COLUMN, OPEN APPROACH: ICD-10-PCS | Performed by: NEUROLOGICAL SURGERY

## 2017-08-20 RX ADMIN — Medication 10 ML: at 10:09

## 2017-08-20 RX ADMIN — STANDARDIZED SENNA CONCENTRATE AND DOCUSATE SODIUM 2 TABLET: 8.6; 5 TABLET, FILM COATED ORAL at 10:08

## 2017-08-20 RX ADMIN — HEPARIN SODIUM 5000 UNITS: 5000 INJECTION, SOLUTION INTRAVENOUS; SUBCUTANEOUS at 10:08

## 2017-08-20 RX ADMIN — ESCITALOPRAM OXALATE 20 MG: 10 TABLET ORAL at 10:08

## 2017-08-20 RX ADMIN — OXYCODONE HYDROCHLORIDE AND ACETAMINOPHEN 2 TABLET: 5; 325 TABLET ORAL at 10:08

## 2017-08-20 RX ADMIN — Medication 10 ML: at 06:15

## 2017-08-20 RX ADMIN — LEVETIRACETAM 500 MG: 500 TABLET ORAL at 10:08

## 2017-08-20 RX ADMIN — OXYCODONE HYDROCHLORIDE AND ACETAMINOPHEN 2 TABLET: 5; 325 TABLET ORAL at 06:14

## 2017-08-20 NOTE — PROGRESS NOTES
Problem: Mobility Impaired (Adult and Pediatric)  Goal: *Acute Goals and Plan of Care (Insert Text)  Discharge Goals:  (1.)Ms. Hamilton will move from supine to sit and sit to supine , scoot up and down and roll side to side with INDEPENDENT within 7 day(s). (2.)Ms. Hamilton will transfer from bed to chair and chair to bed with MODIFIED INDEPENDENCE using the least restrictive device within 7 day(s). (3.)Ms. Hamilton will ambulate with SUPERVISION for 500+ feet with the least restrictive device within 7 day(s). ________________________________________________________________________________________________      PHYSICAL THERAPY: Daily Note, Treatment Day: 2nd and AM 8/20/2017  INPATIENT: Hospital Day: 4  Payor: Enrrique Ricardo / Plan: [facility] / Product Type: Kaley Quan /      NAME/AGE/GENDER: David Hendrix is a 64 y.o. female   PRIMARY DIAGNOSIS: Lumbar spinal stenosis [M48.06] <principal problem not specified> <principal problem not specified>  Procedure(s) (LRB):  LEFT L2-3 OPEN TLIF WITH REVISION OF HARDWARE (Left)  3 Days Post-Op  ICD-10: Treatment Diagnosis:       · Generalized Muscle Weakness (M62.81)  · Difficulty in walking, Not elsewhere classified (R26.2)  · History of falling (Z91.81)   Precaution/Allergies:  Levaquin [levofloxacin]; Lyrica [pregabalin]; and Sulfa (sulfonamide antibiotics)       ASSESSMENT:      Ms. Lupe Boykin is supine in bed on contact and agreeable to therapy. Pt performed supine to sit with modified independence and additional time. Pt stood, donned brace independently, and then ambulated about 450' with rolling walker and SBA. Pt with much less of the shaky movements today during standing and ambulation. Pt is making great progress, increased ambulation and less assistance required. Pt planning on going home later today. Will continue with POC. This section established at most recent assessment   PROBLEM LIST (Impairments causing functional limitations):  1.  Decreased Strength  2. Decreased ADL/Functional Activities  3. Decreased Transfer Abilities  4. Decreased Ambulation Ability/Technique  5. Decreased Balance  6. Increased Pain  7. Decreased Activity Tolerance  8. Decreased Pacing Skills  9. Increased Fatigue  10. Decreased Knowledge of Precautions  11. Decreased [place] with Home Exercise Program    INTERVENTIONS PLANNED: (Benefits and precautions of physical therapy have been discussed with the patient.)  1. Balance Exercise  2. Bed Mobility  3. Family Education  4. Gait Training  5. Home Exercise Program (HEP)  6. Neuromuscular Re-education/Strengthening  7. Range of Motion (ROM)  8. Therapeutic Activites  9. Therapeutic Exercise/Strengthening  10. Transfer Training  11. Group Therapy      TREATMENT PLAN: Frequency/Duration: twice daily for duration of hospital stay  Rehabilitation Potential For Stated Goals: GOOD      RECOMMENDED REHABILITATION/EQUIPMENT: (at time of discharge pending progress): Continue Skilled Therapy and Home Health: Physical Therapy. HISTORY:   History of Present Injury/Illness (Reason for Referral):  S/p LEFT L2-3 OPEN TLIF WITH REVISION OF HARDWARE  Past Medical History/Comorbidities:   Ms. Fran Hernandez  has a past medical history of Anxiety; Arthritis; Atypical ductal hyperplasia of right breast (12/12/2016); Cancer ([facility] [address] Carpal tunnel syndrome; Chronic obstructive pulmonary disease ([facility] [address] Chronic pain; Claustrophobia; DDD (degenerative disc disease), lumbar; Ductal carcinoma in situ (DCIS) of right breast (1/17/2017); History of cigar smoking; History of peptic ulcer; History of URI (upper respiratory infection); History of vitamin D deficiency; Insomnia; Lateral epicondylitis  of elbow; Lumbago; Lumbar radiculopathy; Lumbar stenosis with neurogenic claudication (6/11/2015); Memory difficulty; Mixed hyperlipidemia; Myopathy; Neuroma of foot; Nicotine vapor product user; Obesity (BMI 30.0-34.9);  Other hyperalimentation; Papule; Poor historian; Pure hypercholesterolemia; Recurrent major depressive disorder, in full remission ([facility] [address] (04/27/2016); Seizure disorder ([facility] [address] Smoker; and Tobacco abuse. She also has no past medical history of Adverse effect of anesthesia; Difficult intubation; Malignant hyperthermia due to anesthesia; Nausea & vomiting; or Pseudocholinesterase deficiency. Ms. Anne-Marie Calloway  has a past surgical history that includes cyst removal (Right); carpal tunnel release (Bilateral); colonoscopy (04/2010); marcella biopsy breast stereotactic (Right, 11/28/2016); bunionectomy (Left); tubal ligation; breast biopsy (Bilateral); breast lumpectomy (Right, 1/6/2017); breast biopsy (Right, 1/6/2017); heent; breast lumpectomy (Right, 2/16/2017); back surgery (6/15 latest); and back surgery (11/2016). Social History/Living Environment:   Home Environment: Private residence  # Steps to Enter: 1  Rails to Enter: No  One/Two Story Residence: One story  Living Alone: No  Support Systems: Spouse/Significant Other/Partner  Patient Expects to be Discharged to[de-identified] Private residence  Current DME Used/Available at Home: Wheelchair, [address], straight  Tub or Shower Type: Shower  Prior Level of Function/Work/Activity:  Lives with , indep with household gait and ADLs, 3 falls, drives, gait distance limited secondary to pain prior to surgery. Number of Personal Factors/Comorbidities that affect the Plan of Care: 3+: HIGH COMPLEXITY   EXAMINATION:   Most Recent Physical Functioning:   Gross Assessment:                  Posture:     Balance:  Sitting: Intact  Standing - Static: Good  Standing - Dynamic : Fair (+) Bed Mobility:  Supine to Sit: Modified independent; Additional time  Wheelchair Mobility:     Transfers:  Sit to Stand: Supervision  Stand to Sit: Supervision  Gait:     Base of Support: Narrowed  Speed/Yessica: Pace decreased (<100 feet/min)  Step Length: Left shortened;Right shortened  Gait Abnormalities: Decreased step clearance  Distance (ft): 450 Feet (ft)  Assistive Device: Walker, rolling  Ambulation - Level of Assistance: Stand-by asssistance       Body Structures Involved:  1. Nerves  2. Heart  3. Lungs  4. Bones  5. Joints  6. Muscles  7. Ligaments Body Functions Affected:  1. Sensory/Pain  2. Cardio  3. Respiratory  4. Neuromusculoskeletal  5. Movement Related Activities and Participation Affected:  1. General Tasks and Demands  2. Mobility  3. Self Care  4. Domestic Life  5. Interpersonal Interactions and Relationships  6. Community, Social and [place]   Number of elements that affect the Plan of Care: 4+: HIGH COMPLEXITY   CLINICAL PRESENTATION:   Presentation: Stable and uncomplicated: LOW COMPLEXITY   CLINICAL DECISION MAKIN [facility] Inpatient Short Form  How much difficulty does the patient currently have. .. Unable A Lot A Little None   1. Turning over in bed (including adjusting bedclothes, sheets and blankets)? [ ] 1   [ ] 2   [X] 3   [ ] 4   2. Sitting down on and standing up from a chair with arms ( e.g., wheelchair, bedside commode, etc.)   [ ] 1   [ ] 2   [X] 3   [ ] 4   3. Moving from lying on back to sitting on the side of the bed? [ ] 1   [ ] 2   [X] 3   [ ] 4   How much help from another person does the patient currently need. .. Total A Lot A Little None   4. Moving to and from a bed to a chair (including a wheelchair)? [ ] 1   [ ] 2   [X] 3   [ ] 4   5. Need to walk in hospital room? [ ] 1   [X] 2   [ ] 3   [ ] 4   6. Climbing 3-5 steps with a railing? [ ] 1   [X] 2   [ ] 3   [ ] 4   © , Trustees of [address] [place] [address] [address] [address], under license to [employer]. All rights reserved    Score:  Initial: 16 Most Recent: X (Date: -- )     Interpretation of Tool:  Represents activities that are increasingly more difficult (i.e. Bed mobility, Transfers, Gait).        Score 24 23 22-20 19-15 14-10 9-7 6       Modifier CH CI CJ CK CL CM CN         · Mobility - Walking and Moving Around:               - CURRENT STATUS:    CK - 40%-59% impaired, limited or restricted               - GOAL STATUS:           CJ - 20%-39% impaired, limited or restricted               - D/C STATUS:                       ---------------To be determined---------------  Payor: [employer] / Plan: [place] [employer] / Product Type:  /       Medical Necessity:     · Patient is expected to demonstrate progress in strength, range of motion, balance, coordination and functional technique to decrease assistance required with gait, transfers, and functional mobility. Reason for Services/Other Comments:  · Patient continues to require skilled intervention due to decreased strength, decreased balance, decreased functional tolerance, decreased cardiopulmonary endurance affecting participation in basic ADLs and functional tasks. Use of outcome tool(s) and clinical judgement create a POC that gives a: Clear prediction of patient's progress: LOW COMPLEXITY                 TREATMENT:      Pre-treatment Symptoms/Complaints:  Reports feeling better and ready to go home. Pain: Initial:      Post Session:        Therapeutic Activity: (    13 minutes): Therapeutic activities including Bed transfers and Ambulation on level ground to improve mobility, strength, balance and coordination. Required minimal   to promote dynamic balance in standing. Braces/Orthotics/Lines/Etc:   · IV and drain MYCHAL   Treatment/Session Assessment:    · Response to Treatment:  Increased ambulation   · Interdisciplinary Collaboration:  · Physical Therapy Assistant and Registered Nurse  · After treatment position/precautions:  · Up in chair, Bed/Chair-wheels locked, Call light within reach and RN notified  · Compliance with Program/Exercises: Will assess as treatment progresses. · Recommendations/Intent for next treatment session:   \"Next visit will focus on advancements to more challenging activities and reduction in assistance provided\".   Total Treatment Duration:PT Patient Time In/Time Out  Time In: 1010  Time Out: [address], PTA

## 2017-08-20 NOTE — PROGRESS NOTES
NS  POD#3  AFEBRILE  DRY DRESSINGS  5/5 POWER  MYCHAL   DECREASED OUTPUT  A/P  PULL MYCHAL Alcantara MD

## 2017-08-24 NOTE — OP NOTES
@7HNYW@   [facility] [id number]  [id number]    1960  64 y.o.  female       Operative Report    Date of Surgery: 8/17/2017     Preoperative Diagnosis: L2-3 STENOSIS    Postoperative Diagnosis: SAME    Surgeon(s) and Role:     * Heladio Barron MD - Primary    Anesthesia: General     Procedure:   1. L2-L3 total facetectomies, laminectomy, and foraminotomies for decompression of nerves. 2. Left L2-L3 transfacet lumbar interbody fusion with local autograft, BMP, and interbody cage (Staxx XD, Spine Wave). 3. [place] of local autograft. 4. Revision of existing hardware with placement of new pedicle screws at L2 bilaterally and application of [place] hardware (Spine Wave). 5. Intraoperative fluoroscopic guidance.     Procedure in Detail:   After appropriate informed consent was obtained, the patient was taken to the operating suite where satisfactory anesthesia was induced. The patient was then turned into the prone position on the operating table on chest rolls. All pressure points were carefully padded. Perioperative antibiotics were given. The thoracolumbar region was prepped and draped in the usual sterile fashion. Intraoperative fluoroscopy was used to localize the L2-L4 levels. The skin was infiltrated with 1% Lidocaine with epinephrine. A vertical linear incision was then made in the midline extending from L1 to L4. Dissection was carried down through the subcutaneous tissue. The fascia was incised longitudinally. The spinous processes were identified. Intraoperative fluoroscopy confirmed the appropriate levels. The paraspinal muscles were swept laterally away from the bony elements of the spine on both sides. Dissection was carried out laterally over the transverse processes on both sides. The existing Legacy (Medtronic) pedicle screw hardware was exposed on both sides down to L4. Meticulous hemostasis was obtained. Self-retaining retractors were placed in the wound.  The wound was then irrigated copiously with antibiotic solution.     Next, entry points for pedicle screws at L2 were identified using intraoperative fluoroscopy. Divots were placed at each of these entry points using the 538 Shukri drill. [place] were driven down each of these pedicles under fluoroscopic guidance. These guide holes were then tapped, again under fluoroscopic guidance. Each of these holes was then probed, and good solid bone was found on all four sides of each hole. Legacy pedicle screws of appropriate size were then driven down each pedicle under fluoroscopic guidance. The bony purchase on each side was found to be satisfactory.       Next, the wound was irrigated once more copiously with antibiotic solution. Meticulous hemostasis was obtained. The Midas-Neptali drill and Kerrison rongeur were used to perform a total laminectomy and total facetectomies at L2. Bone was harvested during this part of the procedure for use as local autograft later in the case. The [place] rongeur was used to remove the thickened underlying ligament flavum with care being given to protection of the nerve roots and the thecal sac. The left L2 and L3 nerve roots were both identified and were mobilized. Epidural veins were coagulated and sharply divided. The pedicles were identified both superiorly and inferiorly. The L2-L3 disc space was identified and was incised sharply. Disc material was removed from the disc space in a piecemeal fashion using the pituitary rongeurs. The nerves were inspected and were found to be completely decompressed. Next, the sequential distractors were inserted into the disc space under fluoroscopic guidance. Additional disc material was removed. The end plates were carefully prepared using reverse angle curettes and pituitary rongeurs.  Once the disc space had been completely cleaned of disc material and cartilaginous end plate, local autograft harvested earlier in the case was packed into the disc space far to the right side. BMP sponges which had been prepared at the beginning of the case were also packed into the disc space with the autograft. Next, a Staxx XD cage was brought up to the field and soaked in antibiotic solution. The cage was carefully tapped into the disc space under fluoroscopic guidance. The cage was expanded appropriately. Intraoperative fluoroscopy confirmed excellent cage placement.     Generous foraminotomies were then carried out over the exiting and traversing nerve roots on the right side.     The wound was then irrigated copiously with antibiotic solution. [place] rods of appropriate size were then settled into the pedicle screw heads, and the [place] hooks were clamped onto the existing rods between L3 and L4. Set screws were then tightened down into the construct, securing the rods and [place] hooks in place.       The self-retaining retractors were removed from the wound. Meticulous hemostasis was obtained. A 10 Maltese round fluted Darryl drain was placed in the wound and was brought out through a separate stab incision. The fascia was then closed using interrupted 0 Vicryl sutures. The subcutaneous tissue was closed in a multi-layered fashion. The skin edges were approximated using Dermabond. The drain was hooked to bulb suction and was secured to the skin using a suture. A sterile dressing was applied overall. The patient was then turned back into the supine position on the stretcher were satisfactory anesthesia was reversed. The patient was then taken to the recovery room in satisfactory condition.     Estimated Blood Loss:   75cc    Implants:   Implant Name Type Inv.  Item Serial No.  Lot No. LRB No. Used Action   GRAFT BNE RECOMB HUM INFUSE XS --  - [id number]  GRAFT BNE RECOMB HUM INFUSE XS --   [employer] [facility] CATIA [id number] Left 1 Implanted   CAGE SPNE CONVX EXP LP [id number] -- Mariajose Ego MARKER - [id number]  CAGE SPNE CONVX EXP LP [id number] -- STAXX XD W/TANTALUM MARKER  SPINEWAVE [id number] Left 1 Implanted   SCR SPNE MA 5.5 LEG M8 6.5X50 -- TI - [id number]  SCR SPNE MA 5.5 LEG M8 6.5X50 -- TI  MEDTRONIC [facility] CATIA [id number] Left 2 Implanted   Break Off Screws     [id number] Left 2 Implanted   annex 50mm     [id number] Left 1 Implanted   annex 45mm     [id number] Left 1 Implanted   set screws         [id number] Left 2 Implanted               Specimens: * No specimens in log *        Drains: MYCHAL x 1 to lumbar region                Complications: None    Counts: Sponge and needle counts were correct times two.     Signed By:  Joesph Ramsey MD     August 24, 2017

## 2017-09-06 NOTE — DISCHARGE SUMMARY
Discharge Summary     Patient ID:  Marcelino John  [id number]   59 y.o.  1960    Admit date: 8/17/2017    Discharge Date: 8/20/2017    Admitting Physician: Joesph Ramsey MD     Discharge Physician: Joesph Ramsey MD    Admission Diagnoses: Lumbar spinal stenosis [M48.06]    Last Procedure: Procedure(s):  LEFT L2-3 OPEN TLIF WITH REVISION OF HARDWARE    Discharge Diagnoses: Principal Problem:    Lumbar stenosis with neurogenic claudication (6/11/2015)    Active Problems:    Seizure disorder ([address] ()      Overview: No Sz since late 2011. Sees Dr. Silvia Elise. Pure hypercholesterolemia ()      Asthma ()         Consults: None    Significant Diagnostic Studies: None     Hospital Course:   Normal hospital course for this procedure. Disposition: Home    Patient Instructions:   Cannot display discharge medications since this patient is not currently admitted. Diet: Reference my discharge instructions. Activity: Reference my discharge instructions. Follow-up Appointments   Procedures    FOLLOW UP VISIT Appointment in: Two Weeks     Standing Status:   Standing     Number of Occurrences:   1     Order Specific Question:   Appointment in     Answer: Two Weeks        Total time discharging patient took greater than 30 minutes.     Signed:  Joesph Ramsye MD  September 6, 2017  11:25 AM

## 2017-11-02 ENCOUNTER — HOSPITAL ENCOUNTER (OUTPATIENT)
Dept: MAMMOGRAPHY | Age: 57
Discharge: HOME | End: 2017-11-02
Attending: INTERNAL MEDICINE
Payer: OTHER GOVERNMENT

## 2017-11-02 DIAGNOSIS — D05.11 DUCTAL CARCINOMA IN SITU (DCIS) OF RIGHT BREAST: ICD-10-CM

## 2017-11-02 PROCEDURE — 77067 SCR MAMMO BI INCL CAD: CPT

## 2017-11-15 ENCOUNTER — HOSPITAL ENCOUNTER (OUTPATIENT)
Dept: GENERAL RADIOLOGY | Age: 57
Discharge: HOME | End: 2017-11-15
Payer: OTHER GOVERNMENT

## 2017-11-15 DIAGNOSIS — M54.16 LUMBAR RADICULOPATHY: ICD-10-CM

## 2017-11-15 PROCEDURE — 72100 X-RAY EXAM L-S SPINE 2/3 VWS: CPT

## 2017-11-20 ENCOUNTER — HOSPITAL ENCOUNTER (OUTPATIENT)
Dept: LAB | Age: 57
Discharge: HOME | End: 2017-11-20
Payer: OTHER GOVERNMENT

## 2017-11-20 DIAGNOSIS — D05.11 DUCTAL CARCINOMA IN SITU (DCIS) OF RIGHT BREAST: ICD-10-CM

## 2017-11-20 LAB
ALBUMIN SERPL-MCNC: 3.7 G/DL (ref 3.5–5)
ALBUMIN/GLOB SERPL: 1 {RATIO} (ref 1.2–3.5)
ALP SERPL-CCNC: 156 U/L (ref 50–136)
ALT SERPL-CCNC: 36 U/L (ref 12–65)
ANION GAP SERPL CALC-SCNC: 9 MMOL/L (ref 7–16)
AST SERPL-CCNC: 23 U/L (ref 15–37)
BASOPHILS # BLD: 0.1 K/UL (ref 0–0.2)
BASOPHILS NFR BLD: 1 % (ref 0–2)
BILIRUB SERPL-MCNC: 0.4 MG/DL (ref 0.2–1.1)
BUN SERPL-MCNC: 16 MG/DL (ref 6–23)
CALCIUM SERPL-MCNC: 9.2 MG/DL (ref 8.3–10.4)
CHLORIDE SERPL-SCNC: 106 MMOL/L (ref 98–107)
CHOLEST SERPL-MCNC: 174 MG/DL
CO2 SERPL-SCNC: 25 MMOL/L (ref 21–32)
CREAT SERPL-MCNC: 0.72 MG/DL (ref 0.6–1)
DIFFERENTIAL METHOD BLD: ABNORMAL
EOSINOPHIL # BLD: 0.1 K/UL (ref 0–0.8)
EOSINOPHIL NFR BLD: 1 % (ref 0.5–7.8)
ERYTHROCYTE [DISTWIDTH] IN BLOOD BY AUTOMATED COUNT: 16.1 % (ref 11.9–14.6)
GLOBULIN SER CALC-MCNC: 3.8 G/DL (ref 2.3–3.5)
GLUCOSE SERPL-MCNC: 95 MG/DL (ref 65–100)
HCT VFR BLD AUTO: 42.6 % (ref 35.8–46.3)
HDLC SERPL-MCNC: 41 MG/DL (ref 40–60)
HDLC SERPL: 4.2 {RATIO}
HGB BLD-MCNC: 13.9 G/DL (ref 11.7–15.4)
LDLC SERPL CALC-MCNC: 93.8 MG/DL
LIPID PROFILE,FLP: ABNORMAL
LYMPHOCYTES # BLD: 2.8 K/UL (ref 0.5–4.6)
LYMPHOCYTES NFR BLD: 18 % (ref 13–44)
MCH RBC QN AUTO: 27 PG (ref 26.1–32.9)
MCHC RBC AUTO-ENTMCNC: 32.6 G/DL (ref 31.4–35)
MCV RBC AUTO: 82.7 FL (ref 79.6–97.8)
MONOCYTES # BLD: 0.9 K/UL (ref 0.1–1.3)
MONOCYTES NFR BLD: 6 % (ref 4–12)
NEUTS SEG # BLD: 11.6 K/UL (ref 1.7–8.2)
NEUTS SEG NFR BLD: 75 % (ref 43–78)
NRBC # BLD: 0 K/UL (ref 0–0.2)
PLATELET # BLD AUTO: 384 K/UL (ref 150–450)
PMV BLD AUTO: 8.3 FL (ref 10.8–14.1)
POTASSIUM SERPL-SCNC: 4 MMOL/L (ref 3.5–5.1)
PROT SERPL-MCNC: 7.5 G/DL (ref 6.3–8.2)
RBC # BLD AUTO: 5.15 M/UL (ref 4.05–5.25)
SODIUM SERPL-SCNC: 140 MMOL/L (ref 136–145)
TRIGL SERPL-MCNC: 196 MG/DL (ref 35–150)
VLDLC SERPL CALC-MCNC: 39.2 MG/DL (ref 6–23)
WBC # BLD AUTO: 15.5 K/UL (ref 4.3–11.1)

## 2017-11-20 PROCEDURE — 36415 COLL VENOUS BLD VENIPUNCTURE: CPT | Performed by: INTERNAL MEDICINE

## 2017-11-20 PROCEDURE — 80053 COMPREHEN METABOLIC PANEL: CPT | Performed by: INTERNAL MEDICINE

## 2017-11-20 PROCEDURE — 85025 COMPLETE CBC W/AUTO DIFF WBC: CPT | Performed by: INTERNAL MEDICINE

## 2017-11-20 PROCEDURE — 80061 LIPID PANEL: CPT | Performed by: INTERNAL MEDICINE

## 2017-12-08 PROBLEM — N60.09 BENIGN BREAST CYST IN FEMALE: Status: ACTIVE | Noted: 2017-12-08

## 2017-12-14 ENCOUNTER — HOSPITAL ENCOUNTER (OUTPATIENT)
Dept: RADIATION ONCOLOGY | Age: 57
Discharge: HOME | End: 2017-12-14
Payer: OTHER GOVERNMENT

## 2017-12-14 VITALS
BODY MASS INDEX: 33.41 KG/M2 | HEART RATE: 108 BPM | DIASTOLIC BLOOD PRESSURE: 92 MMHG | SYSTOLIC BLOOD PRESSURE: 150 MMHG | OXYGEN SATURATION: 97 % | WEIGHT: 200.8 LBS | TEMPERATURE: 98.9 F

## 2017-12-14 PROCEDURE — 99211 OFF/OP EST MAY X REQ PHY/QHP: CPT

## 2017-12-14 RX ORDER — ZONISAMIDE 50 MG/1
50 CAPSULE ORAL DAILY
COMMUNITY
End: 2018-04-11 | Stop reason: ALTCHOICE

## 2017-12-14 NOTE — PROGRESS NOTES
Patient: Cesar Cespedes MRN: [id number]  SSN: [id number]    YOB: 1960  Age: 64 y.o. Sex: female      Other Providers:  Petar Gonsalez MD, Sugey Arriola MD    CHIEF COMPLAINT: Breast cancer    DIAGNOSIS: Right breast DCIS, [id number], Stage 0, Intermediate grade. ER 99%, SD 35%. PREVIOUS TREATMENT:  1) Right lumpecomty 1/6/17  2) Re-Excision 2/16/17  3) Radiation treatment of the right breast with 16 fractions of 4256 cGy planned, 5 boost of 1000 cGy planned. Treatment dates: 04/17/17 through 05/15/17    HISTORY OF PRESENT ILLNESS:  Cesar Cespedes is a 64 y.o. female initially seen by Dr. Maite Chang 03/15/17 at the request of Dr. Gregory Morin. She has a pertinent history of multiple back surgeries, a seizure disorder related to a distant trauma, depression (on lexapro), dyslipidemia, osteopenia, COPD (not requiring O2), and bilateral carpal tunnel syndrome. She completed routine screening mammogram followed by bilateral diagnostic mammograms and ultrasound on 11/22/16 which revealed right posterior microcalcifications at 10:30 position for which biopsy was recommended. Needle biopsy on 11/18/16 showed fibrocystic mastopathy having atypical IDH and microcalcifications. She underwent right lumpectomy on 1/6/17 with final pathology showing 1 cm x 0.4 cm intermediate grade DCIS with superior margin being close at 0.1 cm. ER was 99%, SD 35%. Re-excision on 2/16/17 did not show any residual disease. She has no family history of breast cancer. She met with Dr. Gregory Morin 3/9/17 who discussed anti-estrogen therapy with her and referred her to see us in radiation. Of note, on 3/23/17, Dr. Mary Beth Alan has her scheduled for revision of spinal hardware. INTERVAL HISTORY: Mrs Charlee Padilla returns today for routine follow up. She is now 7 months following completion of radiation therapy for her right breast DCIS. Skin changes secondary to radiation therapy have healed. She reports occasional, mild breast tenderness.  She has some limitation with upper extremity ROM secondary to back pain. She is currently working with PT. She has a chronic cough, mostly in mornings that is unchanged. She is scheduled for screening chest CT in January. She was started on Arimidex after radiation under the direction of Dr. Sho Anand and is tolerating well. She has a history of seizures - followed by neurology. OBSTETRIC HISTORY:    Menarche at the age of 15.    G4,P3. Oral Contraceptive Pills:  As a teenager  Hormone Replacement Therapy:  None  Menopause [age]. PAST MEDICAL HISTORY:    Past Medical History:   Diagnosis Date    Anxiety     daily meds    Arthritis     Atypical ductal hyperplasia of right breast 12/12/2016    Cancer [facility])     right breast    Carpal tunnel syndrome     right     Chronic obstructive pulmonary disease (HCC)     does not have inhalers at this time     Chronic pain     Claustrophobia     DDD (degenerative disc disease), lumbar     pt denies    Ductal carcinoma in situ (DCIS) of right breast 1/17/2017    Seeing Dr. Chip Galvez.  History of cigar smoking     History of peptic ulcer     years ago    History of URI (upper respiratory infection)     History of vitamin D deficiency     Insomnia     Lateral epicondylitis  of elbow     Lumbago     Lumbar radiculopathy     Lumbar stenosis with neurogenic claudication 6/11/2015    Memory difficulty     Mixed hyperlipidemia     Myopathy     Neuroma of foot     right     Nicotine vapor product user     Obesity (BMI 30.0-34.9)     33.7    Other hyperalimentation     Papule     Poor historian     Pure hypercholesterolemia     diet controlled     Recurrent major depressive disorder, in full remission ([facility] 04/27/2016    Sees Dr. Cyrus Powell. H/o cutting. Therapist Florinda Giron.      Seizure disorder ([facility]     last seizure -last grand mal july-2017 x 5-; managed with medication; followed by Dr Mak Mei (neuro)    Smoker     40 yr hx  1 1/2 pk per day    Tobacco abuse        The patient denies history of collagen vascular diseases, pacemaker insertion, prior radiation or prior chemotherapy. PAST SURGICAL HISTORY:   Past Surgical History:   Procedure Laterality Date    HX BACK SURGERY  6/15 latest    x4: 2 ruptured one was sciatic nerve; sees Dr. Yifan Griffin  11/2016    dr Froilan Martinezous frequency ablation    HX BACK SURGERY  08/2017    HX BREAST BIOPSY Bilateral     benign    HX BREAST BIOPSY Right 1/6/2017    RIGHT BREAST NEEDLE LOCALIZED BIOPSY/ ARRIVE @ 1000 TO PREOP/ BREAST CENTER @ 1130 FOR RIGHT MAMMO NEEDLE LOC performed by Pravin Olivarez MD at [address] [place] [address] HX BREAST LUMPECTOMY Right 1/6/2017    RIGHT BREAST LUMPECTOMY performed by Pravin Olivarez MD at [address] [place] [address] HX BREAST LUMPECTOMY Right 2/16/2017    EXCISION OF SUPERIOR LUMPECTOMY MARGIN/ RIGHT BREAST performed by Pravin Olivarez MD at [facility] MAIN OR    HX BUNIONECTOMY Left     as well as other procedures- bone spur    HX CARPAL TUNNEL RELEASE Bilateral     HX COLONOSCOPY  04/2010    HX CYST REMOVAL Right     breast     HX HEENT      cyst and polyps from vocal cords    HX TUBAL LIGATION      TOMÁS BIOPSY BREAST STEREOTACTIC Right 11/28/2016       MEDICATIONS:     Current Outpatient Prescriptions:     albuterol (PROVENTIL HFA, VENTOLIN HFA, PROAIR HFA) 90 mcg/actuation inhaler, Take  by inhalation. , Disp: , Rfl:     zonisamide (ZONEGRAN) 100 mg capsule, Take 200 mg by mouth daily. , Disp: , Rfl:     predniSONE (DELTASONE) 20 mg tablet, Three tabs po every day x 3 days then 2 tabs po every day x 3 days then one tab po every day x 3 days, Disp: 18 Tab, Rfl: 0    cefUROXime (CEFTIN) 500 mg tablet, Take 1 Tab by mouth two (2) times a day for 10 days. , Disp: 20 Tab, Rfl: 0    albuterol (PROVENTIL HFA, VENTOLIN HFA, PROAIR HFA) 90 mcg/actuation inhaler, Take 1 Puff by inhalation every four (4) hours as needed for Wheezing., Disp: 1 Inhaler, Rfl: 11    albuterol (PROVENTIL VENTOLIN) 2.5 mg /3 mL (0.083 %) nebulizer solution, 3 mL by Nebulization route every four (4) hours as needed for Wheezing., Disp: 24 Each, Rfl: 11    DULoxetine (CYMBALTA) 20 mg capsule, Take 20 mg by mouth daily. , Disp: , Rfl:     DIPHENHYDRAMINE HCL (BENADRYL PO), Take  by mouth., Disp: , Rfl:     Calcium Carbonate-Vit D3-Min 600 mg calcium- 400 unit tab, Take 1 Tab by mouth daily. , Disp: , Rfl:     anastrozole (ARIMIDEX) 1 mg tablet, Take 1 Tab by mouth daily. (Patient taking differently: Take 1 mg by mouth every morning.), Disp: 90 Tab, Rfl: 3    traMADol (ULTRAM) 50 mg tablet, Take 1 Tab by mouth every six (6) hours as needed for Pain. Max Daily Amount: 200 mg., Disp: 90 Tab, Rfl: 1    traZODone (DESYREL) 100 mg tablet, Take 200 mg by mouth nightly. Indications: major depressive disorder, Disp: , Rfl:     lacosamide (VIMPAT) 200 mg tab tablet, Take 200 mg by mouth two (2) times a day. Indications: take on the dos, Disp: , Rfl:     ALLERGIES:   Allergies   Allergen Reactions    Levaquin [Levofloxacin] Rash    Lyrica [Pregabalin] Swelling    Oxycodone Itching     Pt states not allergic to    Sulfa (Sulfonamide Antibiotics) Itching and Nausea Only       SOCIAL HISTORY:   Social History     Social History    Marital status:      Spouse name: N/A    Number of children: N/A    Years of education: N/A     Occupational History    Not on file.      Social History Main Topics    Smoking status: Current Every Day Smoker     Packs/day: 1.50     Years: 40.00    Smokeless tobacco: Never Used      Comment: tobacco and e-cigarette    Alcohol use No    Drug use: No    Sexual activity: Not on file     Other Topics Concern    Not on file     Social History Narrative       FAMILY HISTORY:   Family History   Problem Relation Age of Onset   [employer] Cancer Brother      Esophageal    Hypertension Mother     Heart Disease Father     Pacemaker Father     Hypertension Father     Alcohol abuse Father    Emanuel Prieto Disease Father      copd    Diabetes Brother     Stroke Brother        REVIEW OF SYSTEMS: Please see the completed review of systems sheet in the chart that I have reviewed today. PHYSICAL EXAMINATION:   ECOG Performance status 1  VITAL SIGNS: blood pressure 150/92  Pulse 108  Temperature  98.9  Weight  200.8       GENERAL: The patient is well-developed, ambulatory, alert and in no acute distress. Lungs clear. Heart rate in NSR. BREASTS: The lumpectomy cavity appears well healed with good aesthetics and symmetry. Well healed surgical incision in right lateral breast .      PATHOLOGY:    1/6/17:   DIAGNOSIS   RIGHT BREAST, NEEDLE LOCALIZED WIDE EXCISION: DUCTAL CARCINOMA IN SITU, MICROPAPILLARY TYPE (INTERMEDIATE GRADE). TUMOR IS APPROXIMATELY 0.1 CM FROM MARKED SUPERIOR MARGIN AND APPROXIMATELY 0.6 CM FROM MARKED INFERIOR MARGIN. OTHER MARGINS OF RESECTION ARE GREATER THAN 1 CM FROM TUMOR. Interpretation   uniRow Multiple Marker Panel:   TEST NAME: RESULTS: INTERNAL CONTROLS:   ESTROGEN RECEPTOR: Positive (99.7%) Present, Positive   PROGESTERONE RECEPTOR: Positive (35.4%) Present, Positive     LABORATORY: none today    RADIOLOGY:      Bilateral digital screening mammogram  11/2/2017     INDICATION:  Screening;  patient had right lumpectomy for DCIS and ADH on  1/6/2017, subsequent radiation     COMPARISON:  11/22/2016 and 11/28/2016 mammography; MRI 1/13/2017,         FINDINGS: Bilateral digital screening mammography was performed, and is  interpreted in conjunction with a computer assisted detection (CAD) system.       The breast parenchyma is heterogeneously dense, which may limit detection of  small masses. Mild right scarring compatible with surgery. A few typically  benign calcifications are again scattered bilaterally.     No developing masses or suspicious calcifications, or nonsurgical architectural  distortion are identified.   There is no unexpected skin thickening or nipple  retraction.      IMPRESSION:  No mammographic evidence of malignancy.       Recommend annual mammogram in one year. A reminder letter will be scheduled.     BI-RADS Assessment Category 2: Benign finding.       IMPRESSION: Rudolph Mcginnis is a 64 y.o. female with right breast DCIS, [id number], Stage 0, Intermediate grade. ER 99%, MO 35% S/P right lumpecomty, 1/6/17 and re-Excision, 2/16/17. She completed radiation therapy 5/15/17. She was started on Arimidex immediately after completing radiation therapy under the direction of Dr. Fidel Mejía. She is now 7 months following completion of radiation therapy. She is recovering as anticipated from radiation. She continues on Arimidex under the management of Dr Fidel Mejía and is tolerating well. Dexa in May 2017 shows osteopenia in which she takes vitamin D and Calcium. Mammogram on 11/2/17 shows no evidence of malignancy, recommending 1 year follow up mammogram.    She has a history of seizures - followed by neurology    PLAN  1) Annual bilateral mammogram - 11/2018  2) Endocrine therapy under the management of Dr. Fidel Mejía  3) Follow up every 6 months x2 years then yearly generally  4) She is scheduled by Dr. Fidel Mejía for Ct lung screening (smoker)   5) I spent 25 minutes in the care of Ms Danni Clifford today, over 50% of which was in direct counseling and ongoing management of her breast cancer. All questions were answered to the best of my ability. Ally Sweeney NP   December 14, 2017       Portions of this note were copied from prior encounters and reviewed for accuracy, currency, and represent documentation and tasks completed during this encounter. I verify and attest these portions to be unchanged from prior visits.

## 2017-12-14 NOTE — PROGRESS NOTES
6 month f/u DCIS right breast.  Lumpectomy 1/17. RT end 5-15-17. Taking Arimidex. Mammogram 11-2-17. F/u Dr. Marquise Issa 1-29-18. Pt has chronic back pain. Surgery in August. Number 5. Using cane for ambulation assistance. Seizure history. Pt sees psychiatry and therapist for mental health issues.     Shyla Wen RN

## 2018-01-10 ENCOUNTER — HOSPITAL ENCOUNTER (OUTPATIENT)
Dept: CT IMAGING | Age: 58
Discharge: HOME | End: 2018-01-10
Attending: INTERNAL MEDICINE
Payer: OTHER GOVERNMENT

## 2018-01-10 DIAGNOSIS — Z12.2 ENCOUNTER FOR SCREENING FOR LUNG CANCER: ICD-10-CM

## 2018-01-10 DIAGNOSIS — Z87.891 HISTORY OF SMOKING 30 OR MORE PACK YEARS: ICD-10-CM

## 2018-01-10 DIAGNOSIS — D72.829 LEUKOCYTOSIS, UNSPECIFIED TYPE: ICD-10-CM

## 2018-01-10 PROCEDURE — G0297 LDCT FOR LUNG CA SCREEN: HCPCS

## 2018-01-19 ENCOUNTER — HOSPITAL ENCOUNTER (OUTPATIENT)
Dept: LAB | Age: 58
Discharge: HOME | End: 2018-01-19
Payer: OTHER GOVERNMENT

## 2018-01-19 DIAGNOSIS — D72.829 LEUKOCYTOSIS, UNSPECIFIED TYPE: ICD-10-CM

## 2018-01-19 DIAGNOSIS — Z87.891 HISTORY OF SMOKING 30 OR MORE PACK YEARS: ICD-10-CM

## 2018-01-19 DIAGNOSIS — Z12.2 ENCOUNTER FOR SCREENING FOR LUNG CANCER: ICD-10-CM

## 2018-01-19 LAB
ALBUMIN SERPL-MCNC: 3.7 G/DL (ref 3.5–5)
ALBUMIN/GLOB SERPL: 1.1 {RATIO} (ref 1.2–3.5)
ALP SERPL-CCNC: 148 U/L (ref 50–136)
ALT SERPL-CCNC: 24 U/L (ref 12–65)
ANION GAP SERPL CALC-SCNC: 10 MMOL/L (ref 7–16)
AST SERPL-CCNC: 11 U/L (ref 15–37)
BASOPHILS # BLD: 0.1 K/UL (ref 0–0.2)
BASOPHILS NFR BLD: 1 % (ref 0–2)
BILIRUB SERPL-MCNC: 0.2 MG/DL (ref 0.2–1.1)
BUN SERPL-MCNC: 17 MG/DL (ref 6–23)
CALCIUM SERPL-MCNC: 8.4 MG/DL (ref 8.3–10.4)
CHLORIDE SERPL-SCNC: 110 MMOL/L (ref 98–107)
CO2 SERPL-SCNC: 23 MMOL/L (ref 21–32)
CREAT SERPL-MCNC: 0.8 MG/DL (ref 0.6–1)
CRP SERPL-MCNC: 0.3 MG/DL (ref 0–0.9)
DIFFERENTIAL METHOD BLD: ABNORMAL
EOSINOPHIL # BLD: 0.1 K/UL (ref 0–0.8)
EOSINOPHIL NFR BLD: 1 % (ref 0.5–7.8)
ERYTHROCYTE [DISTWIDTH] IN BLOOD BY AUTOMATED COUNT: 15.3 % (ref 11.9–14.6)
ERYTHROCYTE [SEDIMENTATION RATE] IN BLOOD: 7 MM/HR (ref 0–30)
GLOBULIN SER CALC-MCNC: 3.5 G/DL (ref 2.3–3.5)
GLUCOSE SERPL-MCNC: 136 MG/DL (ref 65–100)
HCT VFR BLD AUTO: 42.3 % (ref 35.8–46.3)
HGB BLD-MCNC: 14.2 G/DL (ref 11.7–15.4)
LYMPHOCYTES # BLD: 3.5 K/UL (ref 0.5–4.6)
LYMPHOCYTES NFR BLD: 30 % (ref 13–44)
Lab: NORMAL
Lab: NORMAL
MCH RBC QN AUTO: 27.8 PG (ref 26.1–32.9)
MCHC RBC AUTO-ENTMCNC: 33.6 G/DL (ref 31.4–35)
MCV RBC AUTO: 82.9 FL (ref 79.6–97.8)
MONOCYTES # BLD: 0.7 K/UL (ref 0.1–1.3)
MONOCYTES NFR BLD: 6 % (ref 4–12)
NEUTS SEG # BLD: 7.3 K/UL (ref 1.7–8.2)
NEUTS SEG NFR BLD: 63 % (ref 43–78)
NRBC # BLD: 0 K/UL (ref 0–0.2)
PLATELET # BLD AUTO: 375 K/UL (ref 150–450)
PMV BLD AUTO: 8.6 FL (ref 10.8–14.1)
POTASSIUM SERPL-SCNC: 3.6 MMOL/L (ref 3.5–5.1)
PROT SERPL-MCNC: 7.2 G/DL (ref 6.3–8.2)
RBC # BLD AUTO: 5.1 M/UL (ref 4.05–5.25)
REFERENCE LAB,REFLB: NORMAL
REFERENCE LAB,REFLB: NORMAL
SODIUM SERPL-SCNC: 143 MMOL/L (ref 136–145)
TEST DESCRIPTION:,ATST: NORMAL
TEST DESCRIPTION:,ATST: NORMAL
WBC # BLD AUTO: 11.7 K/UL (ref 4.3–11.1)

## 2018-01-19 PROCEDURE — 85025 COMPLETE CBC W/AUTO DIFF WBC: CPT | Performed by: INTERNAL MEDICINE

## 2018-01-19 PROCEDURE — 86140 C-REACTIVE PROTEIN: CPT | Performed by: INTERNAL MEDICINE

## 2018-01-19 PROCEDURE — 88184 FLOWCYTOMETRY/ TC 1 MARKER: CPT | Performed by: INTERNAL MEDICINE

## 2018-01-19 PROCEDURE — 36415 COLL VENOUS BLD VENIPUNCTURE: CPT | Performed by: INTERNAL MEDICINE

## 2018-01-19 PROCEDURE — 85652 RBC SED RATE AUTOMATED: CPT | Performed by: INTERNAL MEDICINE

## 2018-01-19 PROCEDURE — 80053 COMPREHEN METABOLIC PANEL: CPT | Performed by: INTERNAL MEDICINE

## 2018-01-19 PROCEDURE — 88185 FLOWCYTOMETRY/TC ADD-ON: CPT | Performed by: INTERNAL MEDICINE

## 2018-01-20 LAB
Lab: NORMAL
REFERENCE LAB,REFLB: NORMAL
TEST DESCRIPTION:,ATST: NORMAL

## 2018-01-22 LAB — PATH REV BLD -IMP: NORMAL

## 2018-01-29 ENCOUNTER — HOSPITAL ENCOUNTER (OUTPATIENT)
Dept: LAB | Age: 58
Discharge: HOME | End: 2018-01-29
Payer: OTHER GOVERNMENT

## 2018-01-29 DIAGNOSIS — D05.11 DUCTAL CARCINOMA IN SITU (DCIS) OF RIGHT BREAST: ICD-10-CM

## 2018-01-29 LAB
ALBUMIN SERPL-MCNC: 3.9 G/DL (ref 3.5–5)
ALBUMIN/GLOB SERPL: 1.1 {RATIO} (ref 1.2–3.5)
ALP SERPL-CCNC: 161 U/L (ref 50–136)
ALT SERPL-CCNC: 24 U/L (ref 12–65)
ANION GAP SERPL CALC-SCNC: 5 MMOL/L (ref 7–16)
AST SERPL-CCNC: 13 U/L (ref 15–37)
BASOPHILS # BLD: 0.1 K/UL (ref 0–0.2)
BASOPHILS NFR BLD: 0 % (ref 0–2)
BILIRUB SERPL-MCNC: 0.3 MG/DL (ref 0.2–1.1)
BUN SERPL-MCNC: 12 MG/DL (ref 6–23)
CALCIUM SERPL-MCNC: 9.4 MG/DL (ref 8.3–10.4)
CHLORIDE SERPL-SCNC: 107 MMOL/L (ref 98–107)
CO2 SERPL-SCNC: 27 MMOL/L (ref 21–32)
CREAT SERPL-MCNC: 0.92 MG/DL (ref 0.6–1)
DIFFERENTIAL METHOD BLD: ABNORMAL
EOSINOPHIL # BLD: 0.1 K/UL (ref 0–0.8)
EOSINOPHIL NFR BLD: 1 % (ref 0.5–7.8)
ERYTHROCYTE [DISTWIDTH] IN BLOOD BY AUTOMATED COUNT: 15.1 % (ref 11.9–14.6)
GLOBULIN SER CALC-MCNC: 3.7 G/DL (ref 2.3–3.5)
GLUCOSE SERPL-MCNC: 101 MG/DL (ref 65–100)
HCT VFR BLD AUTO: 43.8 % (ref 35.8–46.3)
HGB BLD-MCNC: 15 G/DL (ref 11.7–15.4)
LYMPHOCYTES # BLD: 3.1 K/UL (ref 0.5–4.6)
LYMPHOCYTES NFR BLD: 22 % (ref 13–44)
MCH RBC QN AUTO: 28.8 PG (ref 26.1–32.9)
MCHC RBC AUTO-ENTMCNC: 34.2 G/DL (ref 31.4–35)
MCV RBC AUTO: 84.2 FL (ref 79.6–97.8)
MONOCYTES # BLD: 1 K/UL (ref 0.1–1.3)
MONOCYTES NFR BLD: 7 % (ref 4–12)
NEUTS SEG # BLD: 9.9 K/UL (ref 1.7–8.2)
NEUTS SEG NFR BLD: 70 % (ref 43–78)
NRBC # BLD: 0 K/UL (ref 0–0.2)
PLATELET # BLD AUTO: 380 K/UL (ref 150–450)
PMV BLD AUTO: 8.4 FL (ref 10.8–14.1)
POTASSIUM SERPL-SCNC: 4.1 MMOL/L (ref 3.5–5.1)
PROT SERPL-MCNC: 7.6 G/DL (ref 6.3–8.2)
RBC # BLD AUTO: 5.2 M/UL (ref 4.05–5.25)
SODIUM SERPL-SCNC: 139 MMOL/L (ref 136–145)
WBC # BLD AUTO: 14.2 K/UL (ref 4.3–11.1)

## 2018-01-29 PROCEDURE — 80053 COMPREHEN METABOLIC PANEL: CPT | Performed by: NURSE PRACTITIONER

## 2018-01-29 PROCEDURE — 85025 COMPLETE CBC W/AUTO DIFF WBC: CPT | Performed by: NURSE PRACTITIONER

## 2018-01-29 PROCEDURE — 36415 COLL VENOUS BLD VENIPUNCTURE: CPT | Performed by: NURSE PRACTITIONER

## 2018-04-18 ENCOUNTER — HOSPITAL ENCOUNTER (OUTPATIENT)
Dept: ULTRASOUND IMAGING | Age: 58
Discharge: HOME | End: 2018-04-18
Attending: FAMILY MEDICINE
Payer: OTHER GOVERNMENT

## 2018-04-18 ENCOUNTER — HOSPITAL ENCOUNTER (OUTPATIENT)
Dept: NUCLEAR MEDICINE | Age: 58
Discharge: HOME | End: 2018-04-18
Attending: FAMILY MEDICINE
Payer: OTHER GOVERNMENT

## 2018-04-18 VITALS — BODY MASS INDEX: 32.78 KG/M2 | WEIGHT: 197 LBS

## 2018-04-18 DIAGNOSIS — R10.11 RUQ PAIN: ICD-10-CM

## 2018-04-18 PROCEDURE — 78227 HEPATOBIL SYST IMAGE W/DRUG: CPT

## 2018-04-18 PROCEDURE — 74011250636 HC RX REV CODE- 250/636: Performed by: FAMILY MEDICINE

## 2018-04-18 PROCEDURE — 76700 US EXAM ABDOM COMPLETE: CPT

## 2018-04-18 RX ORDER — SODIUM CHLORIDE 0.9 % (FLUSH) 0.9 %
10 SYRINGE (ML) INJECTION
Status: COMPLETED | OUTPATIENT
Start: 2018-04-18 | End: 2018-04-18

## 2018-04-18 RX ADMIN — SINCALIDE 1.79 MCG: 5 INJECTION, POWDER, LYOPHILIZED, FOR SOLUTION INTRAVENOUS at 14:41

## 2018-04-18 RX ADMIN — Medication 10 ML: at 13:44

## 2018-04-18 NOTE — PROGRESS NOTES
Let patient know ultrasound does show fatty liver. We will continue to monitor her liver blood work.

## 2018-05-04 PROBLEM — R00.2 PALPITATIONS: Status: ACTIVE | Noted: 2018-05-04

## 2018-06-20 ENCOUNTER — HOSPITAL ENCOUNTER (OUTPATIENT)
Dept: RADIATION ONCOLOGY | Age: 58
Discharge: HOME | End: 2018-06-20
Payer: OTHER GOVERNMENT

## 2018-06-20 VITALS
RESPIRATION RATE: 18 BRPM | HEART RATE: 85 BPM | WEIGHT: 189 LBS | DIASTOLIC BLOOD PRESSURE: 78 MMHG | OXYGEN SATURATION: 97 % | HEIGHT: 65 IN | BODY MASS INDEX: 31.49 KG/M2 | SYSTOLIC BLOOD PRESSURE: 126 MMHG | TEMPERATURE: 98.1 F

## 2018-06-20 PROCEDURE — 99211 OFF/OP EST MAY X REQ PHY/QHP: CPT

## 2018-06-20 NOTE — PROGRESS NOTES
Patient: Matthew Kwok MRN: [id number]  SSN: [id number]    YOB: 1960  Age: 62 y.o. Sex: female      Other Providers:  Hans Carrera MD, Joana Vazquez MD    CHIEF COMPLAINT: Breast cancer    DIAGNOSIS: Right breast DCIS, [id number], Stage 0, Intermediate grade. ER 99%, SC 35%. PREVIOUS TREATMENT:  1) Right lumpecomty 1/6/17  2) Re-Excision 2/16/17  3) Radiation treatment of the right breast with 16 fractions of 4256 cGy planned, 5 boost of 1000 cGy planned. Treatment dates: 04/17/17 through 05/15/17    HISTORY OF PRESENT ILLNESS:  Matthew Kwok is a 62 y.o. female initially seen by Dr. Sara Hinton 03/15/17 at the request of Dr. Cheo Lin. She has a pertinent history of multiple back surgeries, a seizure disorder related to a distant trauma, depression (on lexapro), dyslipidemia, osteopenia, COPD (not requiring O2), and bilateral carpal tunnel syndrome. She completed routine screening mammogram followed by bilateral diagnostic mammograms and ultrasound on 11/22/16 which revealed right posterior microcalcifications at 10:30 position for which biopsy was recommended. Needle biopsy on 11/18/16 showed fibrocystic mastopathy having atypical IDH and microcalcifications. She underwent right lumpectomy on 1/6/17 with final pathology showing 1 cm x 0.4 cm intermediate grade DCIS with superior margin being close at 0.1 cm. ER was 99%, SC 35%. Re-excision on 2/16/17 did not show any residual disease. She has no family history of breast cancer. She met with Dr. Cheo Lin 3/9/17 who discussed anti-estrogen therapy with her and referred her to see us in radiation. Of note, on 3/23/17, Dr. Agustina Padilla has her scheduled for revision of spinal hardware. INTERVAL HISTORY: Mrs Kristian iRvero returns today for routine follow up. She is now 12 months following completion of radiation therapy for her right breast DCIS. She reports occasional, mild breast tenderness.  She reports that she has undergone multiple test under the direction of cardiology for complaint of sharp pain of the right chest wall (under the breast) when leaning over only. She is scheduled next week to discuss results. She underwent a screening chest CT in January which was negative. She has some limitation with upper extremity ROM secondary to chronic back pain. She has a chronic cough, mostly in mornings that is unchanged. She continues to smoke. She was started on Arimidex after radiation under the direction of Dr. Emiliano Lyles and is tolerating well. She has a history of seizures - followed by neurology. OBSTETRIC HISTORY:    Menarche at the age of 15.    G4,P3. Oral Contraceptive Pills:  As a teenager  Hormone Replacement Therapy:  None  Menopause [age]. PAST MEDICAL HISTORY:    Past Medical History:   Diagnosis Date    Anxiety     daily meds    Arthritis     Atypical ductal hyperplasia of right breast 12/12/2016    Cancer [facility])     right breast    Carpal tunnel syndrome     right     Chronic obstructive pulmonary disease (HCC)     does not have inhalers at this time     Chronic pain     Claustrophobia     DDD (degenerative disc disease), lumbar     pt denies    Ductal carcinoma in situ (DCIS) of right breast 1/17/2017    Seeing Dr. Sussy Bennett.  History of cigar smoking     History of peptic ulcer     years ago    History of URI (upper respiratory infection)     History of vitamin D deficiency     Insomnia     Lateral epicondylitis  of elbow     Lumbago     Lumbar radiculopathy     Lumbar stenosis with neurogenic claudication 6/11/2015    Memory difficulty     Mixed hyperlipidemia     Myopathy     Neuroma of foot     right     Nicotine vapor product user     Obesity (BMI 30.0-34.9)     33.7    Other hyperalimentation     Papule     Poor historian     Pure hypercholesterolemia     diet controlled     Recurrent major depressive disorder, in full remission ([facility] [address] 04/27/2016    Sees Dr. Poonam Valadez. H/o cutting. Therapist Willi Gonzalez Daniel.  Seizure disorder ([facility] [address]     last seizure -last grand mal july-2017 x 5-; managed with medication; followed by Dr Marcia Hansen (neuro)    Smoker     40 yr hx  1 1/2 pk per day    Tobacco abuse      The patient denies history of collagen vascular diseases, pacemaker insertion, prior radiation or prior chemotherapy. PAST SURGICAL HISTORY:   Past Surgical History:   Procedure Laterality Date    HX BACK SURGERY  6/15 latest    x4: 2 ruptured one was sciatic nerve; sees Dr. Klaudia Anderson  11/2016    dr Svetlana Dempsey frequency ablation    HX BACK SURGERY  08/2017    HX BREAST BIOPSY Bilateral     benign    HX BREAST BIOPSY Right 1/6/2017    RIGHT BREAST NEEDLE LOCALIZED BIOPSY/ ARRIVE @ 1000 TO PREOP/ BREAST CENTER @ 1130 FOR RIGHT MAMMO NEEDLE LOC performed by Colleen Gonzalez MD at [address] HX BREAST LUMPECTOMY Right 1/6/2017    RIGHT BREAST [facility] [address] [facility] LUM[facility]OM[facility] Right 2/16/2017    EXCISION OF SUPERIOR LUMPECTOMY MARGIN/ RIGHT BREAST performed by Colleen Gonzalez MD at [facility] MA[facility] OR    HX BUNIONECTOMY Left     as well as other procedures- bone spur    HX CARPAL TUNNEL RELEASE Bilateral     HX COLONOSCOPY  04/2010    HX CYST REMOVAL Right     breast     HX HEENT      cyst and polyps from vocal cords    HX TUBAL LIGATION      TOM[place]S BIOPSY BREAST STEREOTACTIC Right 11/28/2016     MEDICATIONS:     Current Outpatient Prescriptions:     metoprolol succinate (TOPROL-XL) 50 mg XL tablet, Take 1 Tab by mouth daily. , Disp: 90 Tab, Rfl: 3    naproxen sodium (ALEVE) 220 mg cap, Take  by mouth as needed. , Disp: , Rfl:     cyclobenzaprine (FLEXERIL) 10 mg tablet, Take 1 Tab by mouth three (3) times daily as needed for Muscle Spasm(s). , Disp: 20 Tab, Rfl: 0    famotidine (PEPCID) 40 mg tablet, Take 1 Tab by mouth daily. , Disp: 30 Tab, Rfl: 11    DULoxetine (CYMBALTA) 60 mg capsule, Take 60 mg by mouth daily. , Disp: , Rfl:   traZODone (DESYREL) 50 mg tablet, Take 250 mg by mouth nightly., Disp: , Rfl:     zolpidem CR (AMBIEN CR) 12.5 mg tablet, Take 12.5 mg by mouth nightly as needed for Sleep., Disp: , Rfl:     zonisamide (ZONEGRAN) 100 mg capsule, Take 200 mg by mouth daily. titrating up. Not taking yet. , Disp: , Rfl:     albuterol (PROVENTIL HFA, VENTOLIN HFA, PROAIR HFA) 90 mcg/actuation inhaler, Take 1 Puff by inhalation every four (4) hours as needed for Wheezing., Disp: 1 Inhaler, Rfl: 11    albuterol (PROVENTIL VENTOLIN) 2.5 mg /3 mL (0.083 %) nebulizer solution, 3 mL by Nebulization route every four (4) hours as needed for Wheezing., Disp: 24 Each, Rfl: 11    Calcium Carbonate-Vit D3-Min 600 mg calcium- 400 unit tab, Take 1 Tab by mouth daily. , Disp: , Rfl:     anastrozole (ARIMIDEX) 1 mg tablet, Take 1 Tab by mouth daily. (Patient taking differently: Take 1 mg by mouth every morning.), Disp: 90 Tab, Rfl: 3    traMADol (ULTRAM) 50 mg tablet, Take 1 Tab by mouth every six (6) hours as needed for Pain. Max Daily Amount: 200 mg., Disp: 90 Tab, Rfl: 1    lacosamide (VIMPAT) 200 mg tab tablet, Take 200 mg by mouth two (2) times a day. Indications: take on the dos, Disp: , Rfl:     ALLERGIES:   Allergies   Allergen Reactions    Levaquin [Levofloxacin] Rash    Lyrica [Pregabalin] Swelling    Oxycodone Itching     Pt states not allergic to    Sulfa (Sulfonamide Antibiotics) Itching and Nausea Only     SOCIAL HISTORY:   Social History     Social History    Marital status:      Spouse name: N/A    Number of children: N/A    Years of education: N/A     Occupational History    Not on file.      Social History Main Topics    Smoking status: Current Every Day Smoker     Packs/day: 1.50     Years: 40.00    Smokeless tobacco: Never Used      Comment: tobacco and e-cigarette    Alcohol use No    Drug use: No    Sexual activity: Not on file     Other Topics Concern    Not on file     Social History Narrative FAMILY HISTORY:   Family History   Problem Relation Age of Onset   [facility] Cancer Brother      Esophageal    Hypertension Mother     Heart Disease Father     Pacemaker Father     Hypertension Father     Alcohol abuse Father     Lung Disease Father      copd    Diabetes Brother     Stroke Brother      REVIEW OF SYSTEMS: Please see the completed review of systems sheet in the chart that I have reviewed today. PHYSICAL EXAMINATION:   ECOG Performance status 1  VITAL SIGNS: blood pressure 126/78  Pulse  85  Temperature  98.1  Weight  189     GENERAL: The patient is well-developed, ambulatory, alert and in no acute distress. Lungs clear. Heart rate in NSR. BREASTS: The lumpectomy cavity appears well healed with good aesthetics and symmetry. Well healed surgical incision in right lateral breast .      PATHOLOGY:    1/6/17:   DIAGNOSIS   RIGHT BREAST, NEEDLE LOCALIZED WIDE EXCISION: DUCTAL CARCINOMA IN SITU, MICROPAPILLARY TYPE (INTERMEDIATE GRADE). TUMOR IS APPROXIMATELY 0.1 CM FROM MARKED SUPERIOR MARGIN AND APPROXIMATELY 0.6 CM FROM MARKED INFERIOR MARGIN. OTHER MARGINS OF RESECTION ARE GREATER THAN 1 CM FROM TUMOR. Interpretation   [employer] Multiple Marker Panel:   TEST NAME: RESULTS: INTERNAL CONTROLS:   ESTROGEN RECEPTOR: Positive (99.7%) Present, Positive   PROGESTERONE RECEPTOR: Positive (35.4%) Present, Positive     LABORATORY: none today    RADIOLOGY:      Bilateral digital screening mammogram  11/2/2017     INDICATION:  Screening;  patient had right lumpectomy for DCIS and ADH on  1/6/2017, subsequent radiation     COMPARISON:  11/22/2016 and 11/28/2016 mammography; MRI 1/13/2017,         FINDINGS: Bilateral digital screening mammography was performed, and is  interpreted in conjunction with a computer assisted detection (CAD) system.       The breast parenchyma is heterogeneously dense, which may limit detection of  small masses. Mild right scarring compatible with surgery.  A few typically  benign calcifications are again scattered bilaterally.     No developing masses or suspicious calcifications, or nonsurgical architectural  distortion are identified. There is no unexpected skin thickening or nipple  retraction.      IMPRESSION:  No mammographic evidence of malignancy.       Recommend annual mammogram in one year. A reminder letter will be scheduled.     BI-RADS Assessment Category 2: Benign finding.       IMPRESSION: Nhung Pimentel is a 62 y.o. female with right breast DCIS, [id number], Stage 0, Intermediate grade. ER 99%, CT 35% S/P right lumpecomty, 1/6/17 and re-Excision, 2/16/17. She completed radiation therapy 5/15/17. She was started on Arimidex immediately after completing radiation therapy under the direction of Dr. Khushi Fitzgerald. She is now 12 months following completion of radiation therapy. She is recovering as anticipated from radiation. She continues on Arimidex under the management of Dr Khushi Fitzgerald and is tolerating well. Dexa in May 2017 shows osteopenia in which she takes vitamin D and Calcium. Mammogram, 11/2/17 shows no evidence of malignancy, recommending 1 year follow up mammogram which she reports is already scheduled. She has a history of seizures - followed by neurology    PLAN  1) Annual bilateral mammogram - scheduled for 11/2018  2) Endocrine therapy under the management of Dr. Khushi Fitzgerald  3) Follow up every 6 months x2 years then yearly generally - Since she is closely followed by medical oncology, we discussed no further follow up after our next visit if doing well. 4) I spent 25 minutes in the care of Ms Rosana Barriga today, over 50% of which was in direct counseling and ongoing management of her breast cancer. All questions were answered to the best of my ability.       Augustine Galan NP   June 20, 2018       Portions of this note were copied from prior encounters and reviewed for accuracy, currency, and represent documentation and tasks completed during this encounter. I verify and attest these portions to be unchanged from prior visits.

## 2018-06-20 NOTE — PROGRESS NOTES
Pt here today for FUP post RT to the right breast for DCIS which ended 5/15/17. Pt is s/p a right lumpectomy 1/6/17. Pt is taking Arimidex as ordered. Pt is scheduled for her next Mammogram for 11/2018.   Pt is c/o occ. stabbing pain in her right breast.

## 2018-06-26 PROBLEM — R07.2 PRECORDIAL PAIN: Status: ACTIVE | Noted: 2018-06-26

## 2018-07-30 ENCOUNTER — HOSPITAL ENCOUNTER (OUTPATIENT)
Dept: LAB | Age: 58
Discharge: HOME | End: 2018-07-30
Payer: MEDICARE

## 2018-07-30 DIAGNOSIS — D05.11 DUCTAL CARCINOMA IN SITU (DCIS) OF RIGHT BREAST: ICD-10-CM

## 2018-07-30 LAB
ALBUMIN SERPL-MCNC: 3.7 G/DL (ref 3.5–5)
ALBUMIN/GLOB SERPL: 1.1 {RATIO} (ref 1.2–3.5)
ALP SERPL-CCNC: 143 U/L (ref 50–136)
ALT SERPL-CCNC: 25 U/L (ref 12–65)
ANION GAP SERPL CALC-SCNC: 8 MMOL/L (ref 7–16)
AST SERPL-CCNC: 14 U/L (ref 15–37)
BASOPHILS # BLD: 0.1 K/UL (ref 0–0.2)
BASOPHILS NFR BLD: 1 % (ref 0–2)
BILIRUB SERPL-MCNC: 0.2 MG/DL (ref 0.2–1.1)
BUN SERPL-MCNC: 10 MG/DL (ref 6–23)
CALCIUM SERPL-MCNC: 9 MG/DL (ref 8.3–10.4)
CHLORIDE SERPL-SCNC: 110 MMOL/L (ref 98–107)
CO2 SERPL-SCNC: 25 MMOL/L (ref 21–32)
CREAT SERPL-MCNC: 0.89 MG/DL (ref 0.6–1)
DIFFERENTIAL METHOD BLD: ABNORMAL
EOSINOPHIL # BLD: 0.1 K/UL (ref 0–0.8)
EOSINOPHIL NFR BLD: 1 % (ref 0.5–7.8)
ERYTHROCYTE [DISTWIDTH] IN BLOOD BY AUTOMATED COUNT: 14.2 % (ref 11.9–14.6)
GLOBULIN SER CALC-MCNC: 3.4 G/DL (ref 2.3–3.5)
GLUCOSE SERPL-MCNC: 113 MG/DL (ref 65–100)
HCT VFR BLD AUTO: 43.8 % (ref 35.8–46.3)
HGB BLD-MCNC: 14.7 G/DL (ref 11.7–15.4)
LYMPHOCYTES # BLD: 3.5 K/UL (ref 0.5–4.6)
LYMPHOCYTES NFR BLD: 24 % (ref 13–44)
MCH RBC QN AUTO: 29.3 PG (ref 26.1–32.9)
MCHC RBC AUTO-ENTMCNC: 33.6 G/DL (ref 31.4–35)
MCV RBC AUTO: 87.3 FL (ref 79.6–97.8)
MONOCYTES # BLD: 0.8 K/UL (ref 0.1–1.3)
MONOCYTES NFR BLD: 6 % (ref 4–12)
NEUTS SEG # BLD: 9.9 K/UL (ref 1.7–8.2)
NEUTS SEG NFR BLD: 69 % (ref 43–78)
NRBC # BLD: 0.01 K/UL (ref 0–0.2)
PLATELET # BLD AUTO: 342 K/UL (ref 150–450)
PMV BLD AUTO: 8.5 FL (ref 10.8–14.1)
POTASSIUM SERPL-SCNC: 3.7 MMOL/L (ref 3.5–5.1)
PROT SERPL-MCNC: 7.1 G/DL (ref 6.3–8.2)
RBC # BLD AUTO: 5.02 M/UL (ref 4.05–5.25)
SODIUM SERPL-SCNC: 143 MMOL/L (ref 136–145)
WBC # BLD AUTO: 14.3 K/UL (ref 4.3–11.1)

## 2018-07-30 PROCEDURE — 36415 COLL VENOUS BLD VENIPUNCTURE: CPT | Performed by: INTERNAL MEDICINE

## 2018-07-30 PROCEDURE — 85025 COMPLETE CBC W/AUTO DIFF WBC: CPT | Performed by: INTERNAL MEDICINE

## 2018-07-30 PROCEDURE — 80053 COMPREHEN METABOLIC PANEL: CPT | Performed by: INTERNAL MEDICINE

## 2018-08-07 ENCOUNTER — HOSPITAL ENCOUNTER (OUTPATIENT)
Dept: REGISTRATION | Age: 58
Discharge: HOME | End: 2018-08-07
Payer: MEDICARE

## 2018-08-07 DIAGNOSIS — F33.42 RECURRENT MAJOR DEPRESSIVE DISORDER, IN FULL REMISSION (HCC): ICD-10-CM

## 2018-08-07 DIAGNOSIS — M51.36 DDD (DEGENERATIVE DISC DISEASE), LUMBAR: ICD-10-CM

## 2018-08-07 DIAGNOSIS — M48.062 LUMBAR STENOSIS WITH NEUROGENIC CLAUDICATION: ICD-10-CM

## 2018-08-07 DIAGNOSIS — R41.3 MEMORY DIFFICULTY: ICD-10-CM

## 2018-08-07 DIAGNOSIS — G40.909 SEIZURE DISORDER (HCC): ICD-10-CM

## 2018-08-07 PROCEDURE — 96101 PR PSYCHOLOGIC TESTING BY PSYCH/PHYS: CPT | Performed by: PSYCHOLOGIST

## 2018-08-07 NOTE — PROGRESS NOTES
CONFIDENTIAL PSYCHOLOGICAL EVALUATION  Name: Bennett Lr  MRN: [id number]  : 1962  Date of Evaluation: 2018       Duration of Evaluation: 4 hours  Diagnosis Code: M41.16, M96.1   Psychiatric Diagnoses:  Depression, Dysthymia, Seizure Disorder  Education: 11 years   Reason for Evaluation: Ms. Louis Chatterjee was referred for evaluation to evaluate the patients psychological appropriateness for possible implant of a spinal cord stimulator. Measures:  Review of Medical Record, Clinical Interview, Mini-Mental Status Examination (MMSE), and Personality Assessment Screener (PAS)  Clinical Interview:  Ms. Louis Chatterjee was alert, oriented, attentive, and prepared for todays evaluation. She reported no difficulty attending to test stimuli. Patient arrived ambulatory with cane. Ms. Louis Chatterjee was quite pleasant, forthcoming, and cooperative throughout this evaluation. Patient has a 12-year educational history with no history of learning disabilities. Ms. Ernesto Tamez chronic back pain is described as stemming from a progressive injury, and her pain difficulties began in . She describes her pain as a constant throbbing, aching, shocking pain, primarily in her right leg tail bone. Patient has just recently been approved for social security disability for medical.  She noted 2 pending legal issues: 1) Pt just cleared from bankruptcy                                                                                      2) Just applied for disability  Patient has been  for 30 years to her , who has been retired from the [employer], and fought in [place] and [place]. Together, they raised 3 children and she was supportive of his extended [employer]. For hobbies, patient enjoys staying active with craft projects, such as painting crush [employer], croquet, houseplants, and working in her yard. She enjoys watching criminal minds, and, non-fiction scary movies to pass time.   She does not have a  history. Ms. Mikala Tsang denies a history of inpatient psychiatric admission and denies history of suicidality/homicidality. She is seeing an outpatient psychotherapist as well as psychiatrist for chronic depression. She is a faithful person who attends [employer] regularly. Patient denies lifelong drug and alcohol abuse and/or substance dependence. She has never violated a pain management contract. Patient evidenced an understanding of the relative risks and benefits of spinal cord stimulation surgery. She was able to correctly phrase in her own words the process of spinal cord stimulator surgery, and the purpose of the implant. She is aware that spinal stimulation will not alleviate 100% of his pain. Results:  Ms. Ira Parr MMSE was average (30/30) for her age, indicating no areas of gross cognitive impairment in the areas of orientation, attention, working memory, registration, recall, language, or visuospatial skills. Results indicate that she has capacity to make her own decisions. Patients PAS profile was valid, indicating no concerns with impression management or malingering. There are no manifestations of serious mental illness or dementia that would impair her ability to consent to the spinal cord stimulator procedure. Conclusion:  On the basis of the current evaluation, Mrs. Nicky Ramsey has the capacity to make decisions and consent to a spinal cord stimulator implant. She is psychologically cleared to move forward if she wishes. I have discussed these results with the patient. 1) It is recommended that the results of this evaluation be considered by the referring physician for relevant treatment planning. Thank you for the referral of this patient. 2) It is recommended that Ms. Hamilton continue to engage in follow-up with her psychiatric and therapist.    Adi Rodriguez Psy.D.           Clinical Psychologist

## 2018-11-03 ENCOUNTER — HOSPITAL ENCOUNTER (OUTPATIENT)
Dept: MAMMOGRAPHY | Age: 58
Discharge: HOME | End: 2018-11-03
Attending: INTERNAL MEDICINE

## 2018-11-03 DIAGNOSIS — Z12.31 SCREENING MAMMOGRAM, ENCOUNTER FOR: ICD-10-CM

## 2018-11-06 ENCOUNTER — HOSPITAL ENCOUNTER (OUTPATIENT)
Dept: MAMMOGRAPHY | Age: 58
Discharge: HOME | End: 2018-11-06
Attending: INTERNAL MEDICINE
Payer: MEDICARE

## 2018-11-06 DIAGNOSIS — D05.11 DUCTAL CARCINOMA IN SITU (DCIS) OF RIGHT BREAST: ICD-10-CM

## 2018-11-06 DIAGNOSIS — N64.4 PAIN OF RIGHT BREAST: ICD-10-CM

## 2018-11-06 PROCEDURE — 77066 DX MAMMO INCL CAD BI: CPT

## 2018-11-19 ENCOUNTER — HOSPITAL ENCOUNTER (OUTPATIENT)
Dept: LAB | Age: 58
Discharge: HOME | End: 2018-11-19
Payer: MEDICARE

## 2018-11-19 DIAGNOSIS — D05.11 DUCTAL CARCINOMA IN SITU (DCIS) OF RIGHT BREAST: ICD-10-CM

## 2018-11-19 DIAGNOSIS — D72.829 LEUKOCYTOSIS, UNSPECIFIED TYPE: ICD-10-CM

## 2018-11-19 LAB
ALBUMIN SERPL-MCNC: 4.1 G/DL (ref 3.5–5)
ALBUMIN/GLOB SERPL: 1.2 {RATIO} (ref 1.2–3.5)
ALP SERPL-CCNC: 124 U/L (ref 50–136)
ALT SERPL-CCNC: 24 U/L (ref 12–65)
ANION GAP SERPL CALC-SCNC: 5 MMOL/L (ref 7–16)
APTT PPP: 29.6 SEC (ref 23.2–35.3)
AST SERPL-CCNC: 16 U/L (ref 15–37)
BASOPHILS # BLD: 0 K/UL (ref 0–0.2)
BASOPHILS NFR BLD: 0 % (ref 0–2)
BILIRUB SERPL-MCNC: 0.3 MG/DL (ref 0.2–1.1)
BUN SERPL-MCNC: 15 MG/DL (ref 6–23)
CALCIUM SERPL-MCNC: 9.2 MG/DL (ref 8.3–10.4)
CHLORIDE SERPL-SCNC: 108 MMOL/L (ref 98–107)
CO2 SERPL-SCNC: 25 MMOL/L (ref 21–32)
CREAT SERPL-MCNC: 0.84 MG/DL (ref 0.6–1)
DIFFERENTIAL METHOD BLD: ABNORMAL
EOSINOPHIL # BLD: 0.1 K/UL (ref 0–0.8)
EOSINOPHIL NFR BLD: 1 % (ref 0.5–7.8)
ERYTHROCYTE [DISTWIDTH] IN BLOOD BY AUTOMATED COUNT: 13.3 % (ref 11.9–14.6)
GLOBULIN SER CALC-MCNC: 3.4 G/DL (ref 2.3–3.5)
GLUCOSE SERPL-MCNC: 96 MG/DL (ref 65–100)
HCT VFR BLD AUTO: 45 % (ref 35.8–46.3)
HGB BLD-MCNC: 15.1 G/DL (ref 11.7–15.4)
IMM GRANULOCYTES # BLD: 0 K/UL (ref 0–0.5)
IMM GRANULOCYTES NFR BLD AUTO: 0 % (ref 0–5)
INR PPP: 1
LYMPHOCYTES # BLD: 3.1 K/UL (ref 0.5–4.6)
LYMPHOCYTES NFR BLD: 31 % (ref 13–44)
MCH RBC QN AUTO: 29.5 PG (ref 26.1–32.9)
MCHC RBC AUTO-ENTMCNC: 33.6 G/DL (ref 31.4–35)
MCV RBC AUTO: 88.1 FL (ref 79.6–97.8)
MONOCYTES # BLD: 0.5 K/UL (ref 0.1–1.3)
MONOCYTES NFR BLD: 5 % (ref 4–12)
NEUTS SEG # BLD: 6.2 K/UL (ref 1.7–8.2)
NEUTS SEG NFR BLD: 62 % (ref 43–78)
NRBC # BLD: 0 K/UL (ref 0–0.2)
PLATELET # BLD AUTO: 278 K/UL (ref 150–450)
PMV BLD AUTO: 8.4 FL (ref 9.4–12.3)
POTASSIUM SERPL-SCNC: 4.2 MMOL/L (ref 3.5–5.1)
PROT SERPL-MCNC: 7.5 G/DL (ref 6.3–8.2)
PROTHROMBIN TIME: 12.5 SEC (ref 11.5–14.5)
RBC # BLD AUTO: 5.11 M/UL (ref 4.05–5.25)
SODIUM SERPL-SCNC: 138 MMOL/L (ref 136–145)
WBC # BLD AUTO: 9.9 K/UL (ref 4.3–11.1)

## 2018-11-19 PROCEDURE — 85730 THROMBOPLASTIN TIME PARTIAL: CPT

## 2018-11-19 PROCEDURE — 36415 COLL VENOUS BLD VENIPUNCTURE: CPT

## 2018-11-19 PROCEDURE — 85025 COMPLETE CBC W/AUTO DIFF WBC: CPT

## 2018-11-19 PROCEDURE — 85610 PROTHROMBIN TIME: CPT

## 2018-11-19 PROCEDURE — 80053 COMPREHEN METABOLIC PANEL: CPT

## 2018-11-20 ENCOUNTER — HOSPITAL ENCOUNTER (OUTPATIENT)
Dept: CT IMAGING | Age: 58
Discharge: HOME | End: 2018-11-20
Attending: INTERNAL MEDICINE
Payer: MEDICARE

## 2018-11-20 ENCOUNTER — HOSPITAL ENCOUNTER (OUTPATIENT)
Dept: LAB | Age: 58
Discharge: HOME | End: 2018-11-20
Attending: INTERNAL MEDICINE
Payer: MEDICARE

## 2018-11-20 VITALS
DIASTOLIC BLOOD PRESSURE: 68 MMHG | HEART RATE: 79 BPM | OXYGEN SATURATION: 97 % | RESPIRATION RATE: 16 BRPM | WEIGHT: 185 LBS | BODY MASS INDEX: 30.82 KG/M2 | TEMPERATURE: 98.1 F | SYSTOLIC BLOOD PRESSURE: 127 MMHG | HEIGHT: 65 IN

## 2018-11-20 DIAGNOSIS — D72.829 LEUKOCYTOSIS, UNSPECIFIED TYPE: ICD-10-CM

## 2018-11-20 LAB
BASOPHILS # BLD: 0 K/UL (ref 0–0.2)
BASOPHILS NFR BLD: 0 % (ref 0–2)
BONE MARROW PREP & W,BMA: NORMAL
DIFFERENTIAL METHOD BLD: ABNORMAL
EOSINOPHIL # BLD: 0.1 K/UL (ref 0–0.8)
EOSINOPHIL NFR BLD: 1 % (ref 0.5–7.8)
ERYTHROCYTE [DISTWIDTH] IN BLOOD BY AUTOMATED COUNT: 13.2 %
HCT VFR BLD AUTO: 41.6 % (ref 35.8–46.3)
HGB BLD-MCNC: 13.9 G/DL (ref 11.7–15.4)
IMM GRANULOCYTES # BLD: 0 K/UL (ref 0–0.5)
IMM GRANULOCYTES NFR BLD AUTO: 0 % (ref 0–5)
LYMPHOCYTES # BLD: 4.5 K/UL (ref 0.5–4.6)
LYMPHOCYTES NFR BLD: 46 % (ref 13–44)
MCH RBC QN AUTO: 30.1 PG (ref 26.1–32.9)
MCHC RBC AUTO-ENTMCNC: 33.4 G/DL (ref 31.4–35)
MCV RBC AUTO: 90 FL (ref 79.6–97.8)
MONOCYTES # BLD: 0.6 K/UL (ref 0.1–1.3)
MONOCYTES NFR BLD: 6 % (ref 4–12)
NEUTS SEG # BLD: 4.6 K/UL (ref 1.7–8.2)
NEUTS SEG NFR BLD: 47 % (ref 43–78)
NRBC # BLD: 0 K/UL (ref 0–0.2)
PLATELET # BLD AUTO: 277 K/UL (ref 150–450)
PMV BLD AUTO: 8.6 FL (ref 9.4–12.3)
RBC # BLD AUTO: 4.62 M/UL (ref 4.05–5.2)
WBC # BLD AUTO: 9.9 K/UL (ref 4.3–11.1)

## 2018-11-20 PROCEDURE — 36415 COLL VENOUS BLD VENIPUNCTURE: CPT

## 2018-11-20 PROCEDURE — 88305 TISSUE EXAM BY PATHOLOGIST: CPT

## 2018-11-20 PROCEDURE — 88311 DECALCIFY TISSUE: CPT

## 2018-11-20 PROCEDURE — 88184 FLOWCYTOMETRY/ TC 1 MARKER: CPT

## 2018-11-20 PROCEDURE — 74011250636 HC RX REV CODE- 250/636: Performed by: RADIOLOGY

## 2018-11-20 PROCEDURE — 88312 SPECIAL STAINS GROUP 1: CPT

## 2018-11-20 PROCEDURE — 99152 MOD SED SAME PHYS/QHP 5/>YRS: CPT

## 2018-11-20 PROCEDURE — 88264 CHROMOSOME ANALYSIS 20-25: CPT

## 2018-11-20 PROCEDURE — 85025 COMPLETE CBC W/AUTO DIFF WBC: CPT

## 2018-11-20 PROCEDURE — 88185 FLOWCYTOMETRY/TC ADD-ON: CPT

## 2018-11-20 PROCEDURE — 88313 SPECIAL STAINS GROUP 2: CPT

## 2018-11-20 PROCEDURE — 88237 TISSUE CULTURE BONE MARROW: CPT

## 2018-11-20 PROCEDURE — 74011250636 HC RX REV CODE- 250/636

## 2018-11-20 PROCEDURE — 77030028872 CT BX BONE MARROW AND ASPIRATION

## 2018-11-20 RX ORDER — IBUPROFEN 200 MG
600 TABLET ORAL
Status: DISCONTINUED | OUTPATIENT
Start: 2018-11-20 | End: 2018-11-24 | Stop reason: HOSPADM

## 2018-11-20 RX ORDER — HYDROCODONE BITARTRATE AND ACETAMINOPHEN 5; 325 MG/1; MG/1
2 TABLET ORAL
Status: DISCONTINUED | OUTPATIENT
Start: 2018-11-20 | End: 2018-11-24 | Stop reason: HOSPADM

## 2018-11-20 RX ORDER — TRAZODONE HYDROCHLORIDE 100 MG/1
100 TABLET ORAL 3 TIMES DAILY
COMMUNITY

## 2018-11-20 RX ORDER — FENTANYL CITRATE 50 UG/ML
25-50 INJECTION, SOLUTION INTRAMUSCULAR; INTRAVENOUS
Status: DISCONTINUED | OUTPATIENT
Start: 2018-11-20 | End: 2018-11-20 | Stop reason: ALTCHOICE

## 2018-11-20 RX ORDER — MIDAZOLAM HYDROCHLORIDE 1 MG/ML
.5-2 INJECTION, SOLUTION INTRAMUSCULAR; INTRAVENOUS
Status: DISCONTINUED | OUTPATIENT
Start: 2018-11-20 | End: 2018-11-20 | Stop reason: ALTCHOICE

## 2018-11-20 RX ORDER — LIDOCAINE HYDROCHLORIDE 20 MG/ML
20-200 INJECTION, SOLUTION EPIDURAL; INFILTRATION; INTRACAUDAL; PERINEURAL ONCE
Status: COMPLETED | OUTPATIENT
Start: 2018-11-20 | End: 2018-11-20

## 2018-11-20 RX ORDER — SODIUM CHLORIDE 9 MG/ML
150 INJECTION, SOLUTION INTRAVENOUS CONTINUOUS
Status: ACTIVE | OUTPATIENT
Start: 2018-11-20 | End: 2018-11-21

## 2018-11-20 RX ADMIN — MIDAZOLAM HYDROCHLORIDE 1 MG: 1 INJECTION, SOLUTION INTRAMUSCULAR; INTRAVENOUS at 15:33

## 2018-11-20 RX ADMIN — FENTANYL CITRATE 50 MCG: 50 INJECTION, SOLUTION INTRAMUSCULAR; INTRAVENOUS at 15:38

## 2018-11-20 RX ADMIN — LIDOCAINE HYDROCHLORIDE 100 MG: 20 INJECTION, SOLUTION EPIDURAL; INFILTRATION; INTRACAUDAL; PERINEURAL at 15:44

## 2018-11-20 RX ADMIN — FENTANYL CITRATE 50 MCG: 50 INJECTION, SOLUTION INTRAMUSCULAR; INTRAVENOUS at 15:33

## 2018-11-20 RX ADMIN — MIDAZOLAM HYDROCHLORIDE 1 MG: 1 INJECTION, SOLUTION INTRAMUSCULAR; INTRAVENOUS at 15:38

## 2018-11-20 NOTE — PROGRESS NOTES
TRANSFER - IN REPORT:    Verbal report received from Farideh Singer RN on Modesta Bench  being received from IR for routine progression of care      Report consisted of patients Situation, Background, Assessment and   Recommendations(SBAR). Information from the following report(s) SBAR and MAR was reviewed with the receiving nurse. Opportunity for questions and clarification was provided. Assessment completed upon patients arrival to unit and care assumed.

## 2018-11-20 NOTE — DISCHARGE INSTRUCTIONS
[address]  Department of Interventional Radiology  [facility]  [phone] Office  [phone] Fax    BIOPSY DISCHARGE INSTRUCTIONS    General Instructions:     A biopsy is the removal of a small piece of tissue for microscopic examination or testing. Healthy tissue can be obtained for the purpose of tissue-type matching for transplants. Unhealthy tissues are more commonly biopsied to diagnose disease. General Biopsy:     A mass can grow in any area of the body, and we are taking a specimen as ordered by your doctor. The risks are the same. They include bleeding, pain, and infection. Home Care Instructions: You may resume your regular diet and medication regimen. Do not drink alcohol, drive, or make any important legal decisions in the next 24 hours. Do not lift anything heavier than a gallon of milk until the soreness goes away. You may use over the counter acetaminophen or ibuprofen for the soreness. You may apply an ice pack to the affected area for 20-30 minutes at time for the first 24 hours. After that, you may apply a heat pack. Call If: You should call your Physician and/or the Radiology Nurse if you have any questions or concerns about the biopsy site. Call if you should have increased pain, fever, redness, drainage, or bleeding more than a small spot on the bandage. Follow-Up Instructions: Please see your ordering doctor as he/she has requested. To Reach Us: If you have any questions about your procedure, please call the Interventional Radiology department at [phone]. After business hours (5pm) and weekends, call the answering service at [phone] and ask for the Radiologist on call to be paged.      Interventional Radiology General Nurse Discharge    After general anesthesia or intravenous sedation, for 24 hours or while taking prescription Narcotics:  · Limit your activities  · Do not drive and operate hazardous machinery  · Do not make important personal or business decisions  · Do  not drink alcoholic beverages  · If you have not urinated within 8 hours after discharge, please contact your surgeon on call. * Please give a list of your current medications to your Primary Care Provider. * Please update this list whenever your medications are discontinued, doses are     changed, or new medications (including over-the-counter products) are added. * Please carry medication information at all times in case of emergency situations. These are general instructions for a healthy lifestyle:    No smoking/ No tobacco products/ Avoid exposure to second hand smoke  Surgeon General's Warning:  Quitting smoking now greatly reduces serious risk to your health. Obesity, smoking, and sedentary lifestyle greatly increases your risk for illness  A healthy diet, regular physical exercise & weight monitoring are important for maintaining a healthy lifestyle    You may be retaining fluid if you have a history of heart failure or if you experience any of the following symptoms:  Weight gain of 3 pounds or more overnight or 5 pounds in a week, increased swelling in our hands or feet or shortness of breath while lying flat in bed. Please call your doctor as soon as you notice any of these symptoms; do not wait until your next office visit. Recognize signs and symptoms of STROKE:  F-face looks uneven    A-arms unable to move or move unevenly    S-speech slurred or non-existent    T-time-call 911 as soon as signs and symptoms begin-DO NOT go       Back to bed or wait to see if you get better-TIME IS BRAIN.         Patient Signature:  Date: 11/20/2018  Discharging Nurse: Dilan Perdue RN

## 2018-11-20 NOTE — H&P
Department of Interventional Radiology  [phone]    History and Physical    Patient:  Tika Burn MRN:  [id number]  SSN:  [id number]    YOB: 1960  Age:  62 y.o. Sex:  female      Primary Care Provider:  Deboraha Dandy, MD  Referring Physician:  Joseph Collier MD    Subjective:     Chief Complaint: biopsy    History of the Present Illness: The patient is a 62 y.o. female who presents for bone marrow biopsy. Leukocytosis, thrombophilia. NPO x meds. Past Medical History:   Diagnosis Date    Anxiety     daily meds    Arthritis     Atypical ductal hyperplasia of right breast 12/12/2016    Breast cancer ([facility]     Cancer ([facility]     right breast    Carpal tunnel syndrome     right     Chronic obstructive pulmonary disease (HCC)     does not have inhalers at this time     Chronic pain     Claustrophobia     DDD (degenerative disc disease), lumbar     pt denies    Ductal carcinoma in situ (DCIS) of right breast 1/17/2017    Seeing Dr. Tasha Vazquez.  History of cigar smoking     History of peptic ulcer     years ago    History of URI (upper respiratory infection)     History of vitamin D deficiency     Insomnia     Lateral epicondylitis  of elbow     Lumbago     Lumbar radiculopathy     Lumbar stenosis with neurogenic claudication 6/11/2015    Memory difficulty     Mixed hyperlipidemia     Myopathy     Neuroma of foot     right     Nicotine vapor product user     Obesity (BMI 30.0-34.9)     33.7    Other hyperalimentation     Papule     Poor historian     Pure hypercholesterolemia     diet controlled     Recurrent major depressive disorder, in full remission ([facility] 04/27/2016    Sees Dr. Jeni Tee. H/o cutting. Therapist Ephraim Crimes.      Seizure disorder ([facility]     last seizure -last grand mal july-2017 x 5-; managed with medication; followed by Dr Jose Martinez (neuro)    Smoker     36 yr hx  1 1/2 pk per day    Tobacco abuse      Past Surgical History: Procedure Laterality Date    HX BACK SURGERY  6/15 latest    x4: 2 ruptured one was sciatic nerve; sees Dr. Zavala Guard  11/2016    dr Tobin Garcia frequency ablation    HX BACK SURGERY  08/2017    HX BREAST BIOPSY Bilateral     benign    HX BUNIONECTOMY Left     as well as other procedures- bone spur    HX CARPAL TUNNEL RELEASE Bilateral     HX COLONOSCOPY  04/2010    HX CYST REMOVAL Right     breast     HX HEENT      cyst and polyps from vocal cords    HX TUBAL LIGATION      TOMÁS BIOPSY BREAST STEREOTACTIC Right 11/28/2016        Review of Systems:    Pertinent items are noted in the History of Present Illness. Current Outpatient Medications   Medication Sig    traZODone (DESYREL) 50 mg tablet Take 50 mg by mouth nightly.  cyclobenzaprine (FLEXERIL) 10 mg tablet Take 1 Tab by mouth three (3) times daily as needed for Muscle Spasm(s).  anastrozole (ARIMIDEX) 1 mg tablet TAKE ONE TABLET BY MOUTH EVERY DAY    aspirin delayed-release 81 mg tablet Take  by mouth daily.  metoprolol succinate (TOPROL-XL) 50 mg XL tablet Take 1 Tab by mouth daily.  naproxen sodium (ALEVE) 220 mg cap Take  by mouth as needed.  famotidine (PEPCID) 40 mg tablet Take 1 Tab by mouth daily.  DULoxetine (CYMBALTA) 60 mg capsule Take 60 mg by mouth daily.  zolpidem CR (AMBIEN CR) 12.5 mg tablet Take 12.5 mg by mouth nightly as needed for Sleep.  zonisamide (ZONEGRAN) 100 mg capsule Take 200 mg by mouth daily. titrating up. Not taking yet.  albuterol (PROVENTIL HFA, VENTOLIN HFA, PROAIR HFA) 90 mcg/actuation inhaler Take 1 Puff by inhalation every four (4) hours as needed for Wheezing.  albuterol (PROVENTIL VENTOLIN) 2.5 mg /3 mL (0.083 %) nebulizer solution 3 mL by Nebulization route every four (4) hours as needed for Wheezing.  Calcium Carbonate-Vit D3-Min 600 mg calcium- 400 unit tab Take 1 Tab by mouth daily.     lacosamide (VIMPAT) 200 mg tab tablet Take 200 mg by mouth two (2) times a day. Indications: take on the dos    traMADol (ULTRAM) 50 mg tablet Take 1 Tab by mouth every six (6) hours as needed for Pain. Max Daily Amount: 200 mg. No current facility-administered medications for this encounter. Allergies   Allergen Reactions    Levaquin [Levofloxacin] Rash    Lyrica [Pregabalin] Swelling    Oxycodone Itching     Pt states not allergic to    Sulfa (Sulfonamide Antibiotics) Itching and Nausea Only       Family History   Problem Relation Age of Onset    Cancer Brother         Esophageal    Hypertension Mother     Heart Disease Father     Pacemaker Father     Hypertension Father     Alcohol abuse Father     Lung Disease Father         copd    Diabetes Brother     Stroke Brother      Social History     Tobacco Use    Smoking status: Current Every Day Smoker     Packs/day: 1.00     Years: 40.00     Pack years: 40.00    Smokeless tobacco: Never Used    Tobacco comment: tobacco and e-cigarette   Substance Use Topics    Alcohol use: No        Objective:       Physical Examination:    Vitals:    11/20/18 1227 11/20/18 1246   BP: 144/77    Pulse: 88    Resp: 16    Temp: 98.1 °F (36.7 °C)    SpO2: 96%    Weight:  83.9 kg (185 lb)   Height:  5' 5\" (1.651 m)     Blood pressure 144/77, pulse 88, temperature 98.1 °F (36.7 °C), resp. rate 16, height 5' 5\" (1.651 m), weight 83.9 kg (185 lb), last menstrual period 06/07/2015, SpO2 96 %.   HEART: regular rate and rhythm  LUNG: coarse bilaterally  ABDOMEN: normal findings: soft, non-tender  EXTREMITIES: warm, no edema    Laboratory:     Lab Results   Component Value Date/Time    Sodium 138 11/19/2018 11:43 AM    Sodium 143 07/30/2018 12:52 PM    Potassium 4.2 11/19/2018 11:43 AM    Potassium 3.7 07/30/2018 12:52 PM    Chloride 108 (H) 11/19/2018 11:43 AM    Chloride 110 (H) 07/30/2018 12:52 PM    CO2 25 11/19/2018 11:43 AM    CO2 25 07/30/2018 12:52 PM    Anion gap 5 (L) 11/19/2018 11:43 AM    Anion gap 8 07/30/2018 12:52 PM Glucose 96 11/19/2018 11:43 AM    Glucose 113 (H) 07/30/2018 12:52 PM    BUN 15 11/19/2018 11:43 AM    BUN 10 07/30/2018 12:52 PM    Creatinine 0.84 11/19/2018 11:43 AM    Creatinine 0.89 07/30/2018 12:52 PM    GFR est AA >60 11/19/2018 11:43 AM    GFR est AA >60 07/30/2018 12:52 PM    GFR est non-AA >60 11/19/2018 11:43 AM    GFR est non-AA >60 07/30/2018 12:52 PM    Calcium 9.2 11/19/2018 11:43 AM    Calcium 9.0 07/30/2018 12:52 PM    Albumin 4.1 11/19/2018 11:43 AM    Albumin 3.7 07/30/2018 12:52 PM    Protein, total 7.5 11/19/2018 11:43 AM    Protein, total 7.1 07/30/2018 12:52 PM    Globulin 3.4 11/19/2018 11:43 AM    Globulin 3.4 07/30/2018 12:52 PM    A-G Ratio 1.2 11/19/2018 11:43 AM    A-G Ratio 1.1 (L) 07/30/2018 12:52 PM    AST (SGOT) 16 11/19/2018 11:43 AM    AST (SGOT) 14 (L) 07/30/2018 12:52 PM    ALT (SGPT) 24 11/19/2018 11:43 AM    ALT (SGPT) 25 07/30/2018 12:52 PM     Lab Results   Component Value Date/Time    WBC 9.9 11/19/2018 11:43 AM    WBC 14.3 (H) 07/30/2018 12:52 PM    HGB 15.1 11/19/2018 11:43 AM    HGB 14.7 07/30/2018 12:52 PM    HCT 45.0 11/19/2018 11:43 AM    HCT 43.8 07/30/2018 12:52 PM    PLATELET 228 26/08/8478 11:43 AM    PLATELET 286 86/69/4620 12:52 PM     Lab Results   Component Value Date/Time    aPTT 29.6 11/19/2018 11:43 AM    Prothrombin time 12.5 11/19/2018 11:43 AM    INR 1.0 11/19/2018 11:43 AM       Assessment:     Leukocytosis, neutrophilia    Plan:     Planned Procedure:  Bone marrow biopsy    Risks, benefits, and alternatives reviewed with patient and she agrees to proceed with the procedure.       Signed By: Eugenio Linton PA-C     November 20, 2018

## 2018-11-20 NOTE — PROGRESS NOTES
Recovery period without difficulty. Pt alert and oriented and denies pain. Dressing is clean, dry, and intact. Reviewed discharge instructions with patient and , both verbalized understanding. Pt escorted to lobby discharge area via wheelchair. Vital signs and Janet score completed.

## 2018-11-20 NOTE — PROCEDURES
Department of Interventional Radiology  [phone]        Interventional Radiology Brief Procedure Note    Patient: Deepthi Mayo MRN: [id number]  SSN: [id number]    YOB: 1960  Age: 62 y.o. Sex: female      Date of Procedure: 11/20/2018    Pre-Procedure Diagnosis: Leukocytosis. Post-Procedure Diagnosis: SAME    Procedure(s): Image Guided Biopsy    Brief Description of Procedure: Right iliac bone marrow aspiration and core biopsy. Performed By: Chayo Chatman MD     Assistants: None    Anesthesia:Moderate Sedation    Estimated Blood Loss: Less than 10ml    Specimens:  Pathology    Implants:  None    Findings: Unremarkable.      Complications: None    Recommendations: 1 hour bedrest.       Follow Up: Dr Woody Hurd    Signed By: Chayo Chatman MD     November 20, 2018

## 2018-11-20 NOTE — PROGRESS NOTES
IR Nurse Pre-Procedure Checklist Part 2          Consent signed: Yes    H&P complete:  Yes    Antibiotics: No    Airway/Mallampati Done: Yes    Shaved:  No    Pregnancy Form:No    Patient Position: Yes    MD Side: Yes     Biopsy Worksheet: No    Specimen Medium: Yes

## 2018-12-20 ENCOUNTER — HOSPITAL ENCOUNTER (OUTPATIENT)
Dept: RADIATION ONCOLOGY | Age: 58
Discharge: HOME | End: 2018-12-20
Payer: MEDICARE

## 2018-12-20 VITALS
OXYGEN SATURATION: 97 % | SYSTOLIC BLOOD PRESSURE: 141 MMHG | BODY MASS INDEX: 32.23 KG/M2 | WEIGHT: 193.7 LBS | RESPIRATION RATE: 18 BRPM | HEART RATE: 94 BPM | TEMPERATURE: 98.8 F | DIASTOLIC BLOOD PRESSURE: 91 MMHG

## 2018-12-20 PROCEDURE — 99211 OFF/OP EST MAY X REQ PHY/QHP: CPT

## 2018-12-20 NOTE — PROGRESS NOTES
Patient: Sheba Coats MRN: [id number]  SSN: [id number]    YOB: 1960  Age: 62 y.o. Sex: female      Other Providers:  Dillon John MD, Tano Rendon MD    CHIEF COMPLAINT: Breast cancer    DIAGNOSIS: Right breast DCIS, [id number], Stage 0, Intermediate grade. ER 99%, VT 35%. PREVIOUS TREATMENT:  1) Right lumpecomty 1/6/17  2) Re-Excision 2/16/17  3) Radiation treatment of the right breast with 16 fractions of 4256 cGy planned, 5 boost of 1000 cGy planned. Treatment dates: 04/17/17 through 05/15/17    HISTORY OF PRESENT ILLNESS:  Sheba Coats is a 62 y.o. female initially seen by Dr. Savannah Stubbs 03/15/17 at the request of Dr. Sumaya Benjamin. She has a pertinent history of multiple back surgeries, a seizure disorder related to a distant trauma, depression (on lexapro), dyslipidemia, osteopenia, COPD (not requiring O2), and bilateral carpal tunnel syndrome. She completed routine screening mammogram followed by bilateral diagnostic mammograms and ultrasound on 11/22/16 which revealed right posterior microcalcifications at 10:30 position for which biopsy was recommended. Needle biopsy on 11/18/16 showed fibrocystic mastopathy having atypical IDH and microcalcifications. She underwent right lumpectomy on 1/6/17 with final pathology showing 1 cm x 0.4 cm intermediate grade DCIS with superior margin being close at 0.1 cm. ER was 99%, VT 35%. Re-excision on 2/16/17 did not show any residual disease. She has no family history of breast cancer. She met with Dr. Sumaya Benjamin 3/9/17 who discussed anti-estrogen therapy with her and referred her to see us in radiation. Of note, on 3/23/17, Dr. Lorrie Aguila has her scheduled for revision of spinal hardware. INTERVAL HISTORY: Mrs Caleb Curtis returns today for routine follow up. She is now 20 months following completion of radiation therapy for her right breast DCIS. She reports occasional, mild right breast tenderness, otherwise no issues.  Some limitation with upper extremity ROM secondary to chronic back pain, unchanged. Chronic cough, mostly in mornings that is unchanged. She continues to smoke. Continues on Arimidex. OBSTETRIC HISTORY:    Menarche at the age of 15.    G4,P3. Oral Contraceptive Pills:  As a teenager  Hormone Replacement Therapy:  None  Menopause [age]. PAST MEDICAL HISTORY:    Past Medical History:   Diagnosis Date    Anxiety     daily meds    Arthritis     Atypical ductal hyperplasia of right breast 12/12/2016    [facility] cancer ([facility]     Cancer ([facility]     right breast    Carpal tunnel syndrome     right     Chronic obstructive pulmonary disease (HCC)     does not have inhalers at this time     Chronic pain     Claustrophobia     DDD (degenerative disc disease), lumbar     pt denies    Ductal carcinoma in situ (DCIS) of right breast 1/17/2017    Seeing Dr. Kervin Simpson.  History of cigar smoking     History of peptic ulcer     years ago    History of URI (upper respiratory infection)     History of vitamin D deficiency     Insomnia     Lateral epicondylitis  of elbow     Lumbago     Lumbar radiculopathy     Lumbar stenosis with neurogenic claudication 6/11/2015    Memory difficulty     Mixed hyperlipidemia     Myopathy     Neuroma of foot     right     Nicotine vapor product user     Obesity (BMI 30.0-34.9)     33.7    Other hyperalimentation     Papule     Poor historian     Pure hypercholesterolemia     diet controlled     Recurrent major depressive disorder, in full remission ([facility] 04/27/2016    Sees Dr. Felix Regan. H/o cutting. Therapist Pino Rm.  Seizure disorder ([facility]     last seizure -last grand mal july-2017 x 5-; managed with medication; followed by Dr Vandana Caicedo (neuro)    Smoker     40 yr hx  1 1/2 pk per day    Tobacco abuse      The patient denies history of collagen vascular diseases, pacemaker insertion, prior radiation or prior chemotherapy.      PAST SURGICAL HISTORY:   Past Surgical History: Procedure Laterality Date    HX BACK SURGERY  6/15 latest    x4: 2 ruptured one was sciatic nerve; sees Dr. Gaye Martinez  11/2016    dr Ernest Dakins frequency ablation    HX BACK SURGERY  08/2017    HX BREAST BIOPSY Bilateral     benign    HX BREAST BIOPSY Right 1/6/2017    RIGHT BREAST NEEDLE LOCALIZED BIOPSY/ ARRIVE @ 1000 TO PREOP/ BREAST CENTER @ 1130 FOR RIGHT MAMMO NEEDLE LOC performed by Valentin Conner MD at [address] HX BREAST LUMPECTOMY Right 1/6/2017    RIGHT BREAST LUMPECTOMY performed by Valentin Conner MD at [address] HX BREAST LUMPECTOMY Right 2/16/2017    EXCISION OF SUPERIOR LUMPECTOMY MARGIN/ RIGHT BREAST performed by [facility]in [facility] at [facility] MAIN OR    HX BUNIONECTOMY Left     as well as other procedures- bone spur    HX CARPAL TUNNEL RELEASE Bilateral     HX COLONOSCOPY  04/2010    HX CYST REMOVAL Right     breast     HX HEENT      cyst and polyps from vocal cords    HX TUBAL LIGATION      TOMÁS BIOPSY BREAST STEREOTACTIC Right 11/28/2016     MEDICATIONS:     Current Outpatient Medications:     traZODone (DESYREL) 50 mg tablet, Take 50 mg by mouth nightly., Disp: , Rfl:     cyclobenzaprine (FLEXERIL) 10 mg tablet, Take 1 Tab by mouth three (3) times daily as needed for Muscle Spasm(s). , Disp: 20 Tab, Rfl: 0    anastrozole (ARIMIDEX) 1 mg tablet, TAKE ONE TABLET BY MOUTH EVERY DAY, Disp: 90 Tab, Rfl: 3    aspirin delayed-release 81 mg tablet, Take  by mouth daily. , Disp: , Rfl:     metoprolol succinate (TOPROL-XL) 50 mg XL tablet, Take 1 Tab by mouth daily. , Disp: 90 Tab, Rfl: 3    naproxen sodium (ALEVE) 220 mg cap, Take  by mouth as needed. , Disp: , Rfl:     famotidine (PEPCID) 40 mg tablet, Take 1 Tab by mouth daily. , Disp: 30 Tab, Rfl: 11    DULoxetine (CYMBALTA) 60 mg capsule, Take 60 mg by mouth daily. , Disp: , Rfl:     zolpidem CR (AMBIEN CR) 12.5 mg tablet, Take 12.5 mg by mouth nightly as needed for Sleep., Disp: , Rfl:    zonisamide (ZONEGRAN) 100 mg capsule, Take 200 mg by mouth daily. titrating up. Not taking yet. , Disp: , Rfl:     albuterol (PROVENTIL HFA, VENTOLIN HFA, PROAIR HFA) 90 mcg/actuation inhaler, Take 1 Puff by inhalation every four (4) hours as needed for Wheezing., Disp: 1 Inhaler, Rfl: 11    albuterol (PROVENTIL VENTOLIN) 2.5 mg /3 mL (0.083 %) nebulizer solution, 3 mL by Nebulization route every four (4) hours as needed for Wheezing., Disp: 24 Each, Rfl: 11    Calcium Carbonate-Vit D3-Min 600 mg calcium- 400 unit tab, Take 1 Tab by mouth daily. , Disp: , Rfl:     traMADol (ULTRAM) 50 mg tablet, Take 1 Tab by mouth every six (6) hours as needed for Pain. Max Daily Amount: 200 mg., Disp: 90 Tab, Rfl: 1    lacosamide (VIMPAT) 200 mg tab tablet, Take 200 mg by mouth two (2) times a day.  Indications: take on the dos, Disp: , Rfl:     ALLERGIES:   Allergies   Allergen Reactions    Levaquin [Levofloxacin] Rash    Lyrica [Pregabalin] Swelling    Oxycodone Itching     Pt states not allergic to    Sulfa (Sulfonamide Antibiotics) Itching and Nausea Only     SOCIAL HISTORY:   Social History     Socioeconomic History    Marital status:      Spouse name: Not on file    Number of children: Not on file    Years of education: Not on file    Highest education level: Not on file   Social Needs    Financial resource strain: Not on file    Food insecurity - worry: Not on file    Food insecurity - inability: Not on file   [employer] needs - medical: Not on file   [employer] needs - non-medical: Not on file   Occupational History    Not on file   Tobacco Use    Smoking status: Current Every Day Smoker     Packs/day: 1.00     Years: 40.00     Pack years: 40.00    Smokeless tobacco: Never Used    Tobacco comment: tobacco and e-cigarette   Substance and Sexual Activity    Alcohol use: No    Drug use: No    Sexual activity: Not on file   Other Topics Concern   [address] Service Not Asked    Blood Transfusions Not Asked    Caffeine Concern Not Asked    Occupational Exposure Not Asked    Hobby Hazards Not Asked    Sleep Concern Not Asked    Stress Concern Not Asked    Weight Concern Not Asked    Special Diet Not Asked    Back Care Not Asked    Exercise Not Asked    Bike Helmet Not Asked   [address] Not Asked    Self-Exams Not Asked   Social History Narrative    Not on file     FAMILY HISTORY:   Family History   Problem Relation Age of Onset    Cancer Brother         Esophageal    Hypertension Mother     Heart Disease Father     Pacemaker Father     Hypertension Father     Alcohol abuse Father     Lung Disease Father         copd    Diabetes Brother     Stroke Brother      REVIEW OF SYSTEMS: Please see the completed review of systems sheet in the chart that I have reviewed today. PHYSICAL EXAMINATION:   ECOG Performance status 1  VITAL SIGNS: blood pressure 141/91 Pulse  94  Temperature  98.8  Weight 193.7     GENERAL: The patient is well-developed, ambulatory, alert and in no acute distress. Lungs clear. BREASTS: The lumpectomy cavity appears well healed with good aesthetics and symmetry. Well healed surgical incision in right lateral breast .      PATHOLOGY:    1/6/17:   DIAGNOSIS   RIGHT BREAST, NEEDLE LOCALIZED WIDE EXCISION: DUCTAL CARCINOMA IN SITU, MICROPAPILLARY TYPE (INTERMEDIATE GRADE). TUMOR IS APPROXIMATELY 0.1 CM FROM MARKED SUPERIOR MARGIN AND APPROXIMATELY 0.6 CM FROM MARKED INFERIOR MARGIN. OTHER MARGINS OF RESECTION ARE GREATER THAN 1 CM FROM TUMOR.      Interpretation   [email or web address] Multiple Marker Panel:   TEST NAME: RESULTS: INTERNAL CONTROLS:   ESTROGEN RECEPTOR: Positive (99.7%) Present, Positive   PROGESTERONE RECEPTOR: Positive (35.4%) Present, Positive     LABORATORY: none today    RADIOLOGY:      Bilateral digital screening mammogram  11/2/2017     INDICATION:  Screening;  patient had right lumpectomy for DCIS and ADH on  1/6/2017, subsequent radiation     COMPARISON:  11/22/2016 and 11/28/2016 mammography; MRI 1/13/2017,         FINDINGS: Bilateral digital screening mammography was performed, and is  interpreted in conjunction with a computer assisted detection (CAD) system.       The breast parenchyma is heterogeneously dense, which may limit detection of  small masses. Mild right scarring compatible with surgery. A few typically  benign calcifications are again scattered bilaterally.     No developing masses or suspicious calcifications, or nonsurgical architectural  distortion are identified. There is no unexpected skin thickening or nipple  retraction.      IMPRESSION:  No mammographic evidence of malignancy.       Recommend annual mammogram in one year. A reminder letter will be scheduled.     BI-RADS Assessment Category 2: Benign finding.     BILATERAL DIAGNOSTIC DIGITAL MAMMOGRAPHY:  11/6/2018     CLINICAL HISTORY: Right upper outer quadrant pain since August 218 and  27-year-old status post right lumpectomy in January 2017 with subsequent  radiation therapy.       COMPARISON:   Bilateral screening mammogram of November 2, 2017 and multiple  prior studies including MRI of January 13, 2017.     FINDINGS:  Right lumpectomy bed appears unchanged. There is heterogeneously  dense fibroglandular tissue in a fairly symmetric pattern elsewhere bilaterally. No new or enlarging soft tissue mass, suspicious calcifications, or other  definite evidence for malignancy is seen. Scattered typically benign bilateral  calcifications and small axillary tail lymph nodes are again noted. No  significant change is seen from prior study. Nevertheless it should be noted  that mammography fails to detect small percentage of carcinoma, and therefore  any area of persistent painful concern must be managed clinically.     IMPRESSION:          1.   STABLE POST-TREATMENT APPEARANCE OF THE RIGHT BREAST WITH NO SPECIFIC  MAMMOGRAPHIC EVIDENCE FOR MALIGNANCY ON EITHER SIDE.  FOLLOW-UP BILATERAL  SCREENING MAMMOGRAPHY IS RECOMMENDED IN ONE YEAR (SEE ABOVE).     2. MRI SHOULD ALSO BE CONSIDERED 18 MONTHS AFTER COMPLETION OF RADIATION  THERAPY IN ORDER TO ESTABLISH THE NEW POSTTREATMENT BASELINE IN THIS 62YEAR-OLD  WITH HETEROGENEOUSLY DENSE PARENCHYMA      IMPRESSION: Maciej Yu is a 62 y.o. female with right breast DCIS, [id number], Stage 0, Intermediate grade. ER 99%, VT 35% S/P right lumpectomy, 1/6/17 and re-Excision, 2/16/17. She completed radiation therapy 5/15/17. She was started on Arimidex immediately after completing radiation therapy under the direction of Dr. Danyel Bonilla. Now 20 months following completion of radiation therapy. She is recovering as anticipated from radiation. She continues with occasional right lateral breast tenderness, otherwise no issues. Recent mammogram, negative for malignancy. Radiology recommending a post treatment breast MRI, scheduled by medical oncology. There is no further intervention from a radiation standpoint needed at this time. To continue close followup with oncology. We are available if needed      Kaushik Bassett NP   December 20, 2018       Portions of this note were copied from prior encounters and reviewed for accuracy, currency, and represent documentation and tasks completed during this encounter. I verify and attest these portions to be unchanged from prior visits.

## 2018-12-20 NOTE — PROGRESS NOTES
Pt here today for FUP post RT to the right breast DCIS which ended 5/15/17. Pt is s/p a right lumpectomy 1/6/17. Pt stated she is taking Arimidex as ordered. The 11/6/18 mammogram was negative. An MRI Breast was ordered for 1/28/19.

## 2019-01-30 ENCOUNTER — HOSPITAL ENCOUNTER (OUTPATIENT)
Dept: MRI IMAGING | Age: 59
Discharge: HOME | End: 2019-01-30
Attending: INTERNAL MEDICINE

## 2019-01-30 DIAGNOSIS — D05.11 DUCTAL CARCINOMA IN SITU (DCIS) OF RIGHT BREAST: ICD-10-CM

## 2019-01-30 RX ORDER — SODIUM CHLORIDE 0.9 % (FLUSH) 0.9 %
10 SYRINGE (ML) INJECTION
Status: ACTIVE | OUTPATIENT
Start: 2019-01-30 | End: 2019-01-31

## 2019-01-30 RX ORDER — GADOTERATE MEGLUMINE 376.9 MG/ML
18 INJECTION INTRAVENOUS
Status: ACTIVE | OUTPATIENT
Start: 2019-01-30 | End: 2019-01-31

## 2019-01-30 NOTE — PROGRESS NOTES
Patient has a [address] spinal stimulator that we are unable to meet conditions for to scan breast MRI. Will cancel breast MRI, patient is aware.

## 2019-01-31 ENCOUNTER — HOSPITAL ENCOUNTER (OUTPATIENT)
Dept: CT IMAGING | Age: 59
Discharge: HOME | End: 2019-01-31
Attending: INTERNAL MEDICINE
Payer: MEDICARE

## 2019-01-31 VITALS — WEIGHT: 190 LBS | HEIGHT: 65 IN | BODY MASS INDEX: 31.65 KG/M2

## 2019-01-31 DIAGNOSIS — Z87.891 HISTORY OF SMOKING 30 OR MORE PACK YEARS: ICD-10-CM

## 2019-01-31 PROCEDURE — G0297 LDCT FOR LUNG CA SCREEN: HCPCS

## 2019-02-07 ENCOUNTER — HOSPITAL ENCOUNTER (OUTPATIENT)
Dept: LAB | Age: 59
Discharge: HOME | End: 2019-02-07
Payer: MEDICARE

## 2019-02-07 DIAGNOSIS — D50.9 IRON DEFICIENCY ANEMIA, UNSPECIFIED IRON DEFICIENCY ANEMIA TYPE: ICD-10-CM

## 2019-02-07 DIAGNOSIS — D05.11 DUCTAL CARCINOMA IN SITU (DCIS) OF RIGHT BREAST: ICD-10-CM

## 2019-02-07 LAB
ALBUMIN SERPL-MCNC: 3.9 G/DL (ref 3.5–5)
ALBUMIN/GLOB SERPL: 1.1 {RATIO} (ref 1.2–3.5)
ALP SERPL-CCNC: 133 U/L (ref 50–136)
ALT SERPL-CCNC: 18 U/L (ref 12–65)
ANION GAP SERPL CALC-SCNC: 4 MMOL/L (ref 7–16)
AST SERPL-CCNC: 10 U/L (ref 15–37)
BASOPHILS # BLD: 0.1 K/UL (ref 0–0.2)
BASOPHILS NFR BLD: 1 % (ref 0–2)
BILIRUB SERPL-MCNC: 0.3 MG/DL (ref 0.2–1.1)
BUN SERPL-MCNC: 17 MG/DL (ref 6–23)
CALCIUM SERPL-MCNC: 8.6 MG/DL (ref 8.3–10.4)
CHLORIDE SERPL-SCNC: 109 MMOL/L (ref 98–107)
CO2 SERPL-SCNC: 27 MMOL/L (ref 21–32)
CREAT SERPL-MCNC: 0.83 MG/DL (ref 0.6–1)
DIFFERENTIAL METHOD BLD: ABNORMAL
EOSINOPHIL # BLD: 0.2 K/UL (ref 0–0.8)
EOSINOPHIL NFR BLD: 1 % (ref 0.5–7.8)
ERYTHROCYTE [DISTWIDTH] IN BLOOD BY AUTOMATED COUNT: 13.4 % (ref 11.9–14.6)
FERRITIN SERPL-MCNC: 16 NG/ML (ref 8–388)
GLOBULIN SER CALC-MCNC: 3.6 G/DL (ref 2.3–3.5)
GLUCOSE SERPL-MCNC: 87 MG/DL (ref 65–100)
HCT VFR BLD AUTO: 45.1 % (ref 35.8–46.3)
HGB BLD-MCNC: 15.2 G/DL (ref 11.7–15.4)
HGB RETIC QN AUTO: 36 PG (ref 29–35)
IMM GRANULOCYTES # BLD AUTO: 0.1 K/UL (ref 0–0.5)
IMM GRANULOCYTES NFR BLD AUTO: 0 % (ref 0–5)
IMM RETICS NFR: 13.7 % (ref 3–15.9)
IRON SATN MFR SERPL: 11 %
IRON SERPL-MCNC: 46 UG/DL (ref 35–150)
LYMPHOCYTES # BLD: 3.9 K/UL (ref 0.5–4.6)
LYMPHOCYTES NFR BLD: 30 % (ref 13–44)
MCH RBC QN AUTO: 30.6 PG (ref 26.1–32.9)
MCHC RBC AUTO-ENTMCNC: 33.7 G/DL (ref 31.4–35)
MCV RBC AUTO: 90.9 FL (ref 79.6–97.8)
MONOCYTES # BLD: 0.7 K/UL (ref 0.1–1.3)
MONOCYTES NFR BLD: 5 % (ref 4–12)
NEUTS SEG # BLD: 8.3 K/UL (ref 1.7–8.2)
NEUTS SEG NFR BLD: 63 % (ref 43–78)
NRBC # BLD: 0 K/UL (ref 0–0.2)
PLATELET # BLD AUTO: 368 K/UL (ref 150–450)
PMV BLD AUTO: 8 FL (ref 9.4–12.3)
POTASSIUM SERPL-SCNC: 3.9 MMOL/L (ref 3.5–5.1)
PROT SERPL-MCNC: 7.5 G/DL (ref 6.3–8.2)
RBC # BLD AUTO: 4.96 M/UL (ref 4.05–5.25)
RETICS # AUTO: 0.1 M/UL (ref 0.03–0.1)
RETICS/RBC NFR AUTO: 2 % (ref 0.3–2)
SODIUM SERPL-SCNC: 140 MMOL/L (ref 136–145)
TIBC SERPL-MCNC: 431 UG/DL (ref 250–450)
WBC # BLD AUTO: 13.3 K/UL (ref 4.3–11.1)

## 2019-02-07 PROCEDURE — 80053 COMPREHEN METABOLIC PANEL: CPT

## 2019-02-07 PROCEDURE — 36415 COLL VENOUS BLD VENIPUNCTURE: CPT

## 2019-02-07 PROCEDURE — 82728 ASSAY OF FERRITIN: CPT

## 2019-02-07 PROCEDURE — 85046 RETICYTE/HGB CONCENTRATE: CPT

## 2019-02-07 PROCEDURE — 83540 ASSAY OF IRON: CPT

## 2019-02-07 PROCEDURE — 85025 COMPLETE CBC W/AUTO DIFF WBC: CPT

## 2019-02-14 ENCOUNTER — HOSPITAL ENCOUNTER (OUTPATIENT)
Dept: INFUSION THERAPY | Age: 59
Discharge: HOME | End: 2019-02-14
Payer: MEDICARE

## 2019-02-14 VITALS
HEART RATE: 102 BPM | SYSTOLIC BLOOD PRESSURE: 140 MMHG | RESPIRATION RATE: 18 BRPM | TEMPERATURE: 98.3 F | DIASTOLIC BLOOD PRESSURE: 88 MMHG | WEIGHT: 194.8 LBS | BODY MASS INDEX: 32.42 KG/M2 | OXYGEN SATURATION: 93 %

## 2019-02-14 DIAGNOSIS — D50.9 IRON DEFICIENCY ANEMIA, UNSPECIFIED IRON DEFICIENCY ANEMIA TYPE: Primary | ICD-10-CM

## 2019-02-14 PROCEDURE — 96365 THER/PROPH/DIAG IV INF INIT: CPT

## 2019-02-14 PROCEDURE — 74011250636 HC RX REV CODE- 250/636: Performed by: INTERNAL MEDICINE

## 2019-02-14 RX ORDER — HEPARIN 100 UNIT/ML
300-500 SYRINGE INTRAVENOUS AS NEEDED
Status: ACTIVE | OUTPATIENT
Start: 2019-02-14 | End: 2019-02-15

## 2019-02-14 RX ADMIN — FERRIC CARBOXYMALTOSE INJECTION 750 MG: 50 INJECTION, SOLUTION INTRAVENOUS at 13:52

## 2019-02-14 RX ADMIN — SODIUM CHLORIDE 500 ML: 900 INJECTION, SOLUTION INTRAVENOUS at 13:52

## 2019-02-14 NOTE — PROGRESS NOTES
[facility]fer completed. Patient tolerated well. Any issues or concerns during appointment: no. 
Patient aware of next infusion appointment on 2/21 (date) at 2 (time). Discharged home.

## 2019-02-21 ENCOUNTER — HOSPITAL ENCOUNTER (OUTPATIENT)
Dept: INFUSION THERAPY | Age: 59
Discharge: HOME | End: 2019-02-21
Payer: MEDICARE

## 2019-02-21 VITALS
OXYGEN SATURATION: 98 % | DIASTOLIC BLOOD PRESSURE: 75 MMHG | SYSTOLIC BLOOD PRESSURE: 134 MMHG | HEART RATE: 89 BPM | WEIGHT: 194 LBS | TEMPERATURE: 98.5 F | BODY MASS INDEX: 32.28 KG/M2 | RESPIRATION RATE: 18 BRPM

## 2019-02-21 DIAGNOSIS — D50.9 IRON DEFICIENCY ANEMIA, UNSPECIFIED IRON DEFICIENCY ANEMIA TYPE: Primary | ICD-10-CM

## 2019-02-21 PROCEDURE — 96361 HYDRATE IV INFUSION ADD-ON: CPT

## 2019-02-21 PROCEDURE — 74011250636 HC RX REV CODE- 250/636: Performed by: INTERNAL MEDICINE

## 2019-02-21 PROCEDURE — 96365 THER/PROPH/DIAG IV INF INIT: CPT

## 2019-02-21 RX ORDER — SODIUM CHLORIDE 0.9 % (FLUSH) 0.9 %
10 SYRINGE (ML) INJECTION AS NEEDED
Status: ACTIVE | OUTPATIENT
Start: 2019-02-21 | End: 2019-02-22

## 2019-02-21 RX ADMIN — Medication 10 ML: at 14:20

## 2019-02-21 RX ADMIN — FERRIC CARBOXYMALTOSE INJECTION 750 MG: 50 INJECTION, SOLUTION INTRAVENOUS at 14:20

## 2019-02-21 RX ADMIN — SODIUM CHLORIDE 500 ML: 900 INJECTION, SOLUTION INTRAVENOUS at 14:40

## 2019-02-21 NOTE — PROGRESS NOTES
Arrived to the [facility]. Injectafer completed. Patient tolerated well. Any issues or concerns during appointment: none. No future appointments scheduled with infusion at this time. Discharged ambulatory.

## 2019-03-12 ENCOUNTER — ANESTHESIA EVENT (OUTPATIENT)
Dept: ENDOSCOPY | Age: 59
End: 2019-03-12
Payer: MEDICARE

## 2019-03-12 RX ORDER — SODIUM CHLORIDE, SODIUM LACTATE, POTASSIUM CHLORIDE, CALCIUM CHLORIDE 600; 310; 30; 20 MG/100ML; MG/100ML; MG/100ML; MG/100ML
100 INJECTION, SOLUTION INTRAVENOUS CONTINUOUS
Status: CANCELLED | OUTPATIENT
Start: 2019-03-12

## 2019-03-12 RX ORDER — SODIUM CHLORIDE 0.9 % (FLUSH) 0.9 %
5-40 SYRINGE (ML) INJECTION EVERY 8 HOURS
Status: CANCELLED | OUTPATIENT
Start: 2019-03-12

## 2019-03-12 RX ORDER — SODIUM CHLORIDE 0.9 % (FLUSH) 0.9 %
5-40 SYRINGE (ML) INJECTION AS NEEDED
Status: CANCELLED | OUTPATIENT
Start: 2019-03-12

## 2019-03-13 ENCOUNTER — ANESTHESIA (OUTPATIENT)
Dept: ENDOSCOPY | Age: 59
End: 2019-03-13
Payer: MEDICARE

## 2019-03-13 ENCOUNTER — HOSPITAL ENCOUNTER (OUTPATIENT)
Age: 59
Setting detail: OUTPATIENT SURGERY
Discharge: HOME | End: 2019-03-13
Attending: INTERNAL MEDICINE | Admitting: INTERNAL MEDICINE
Payer: MEDICARE

## 2019-03-13 VITALS
SYSTOLIC BLOOD PRESSURE: 126 MMHG | WEIGHT: 192 LBS | OXYGEN SATURATION: 97 % | BODY MASS INDEX: 31.99 KG/M2 | DIASTOLIC BLOOD PRESSURE: 72 MMHG | HEIGHT: 65 IN | HEART RATE: 86 BPM | RESPIRATION RATE: 16 BRPM | TEMPERATURE: 97.8 F

## 2019-03-13 PROCEDURE — 88305 TISSUE EXAM BY PATHOLOGIST: CPT

## 2019-03-13 PROCEDURE — 77030020018 HC MRKR ENDOSC SPOT 5ML SYR GISP -B: Performed by: INTERNAL MEDICINE

## 2019-03-13 PROCEDURE — 76060000032 HC ANESTHESIA 0.5 TO 1 HR: Performed by: INTERNAL MEDICINE

## 2019-03-13 PROCEDURE — 77030029929: Performed by: INTERNAL MEDICINE

## 2019-03-13 PROCEDURE — 74011000250 HC RX REV CODE- 250: Performed by: ANESTHESIOLOGY

## 2019-03-13 PROCEDURE — 74011250637 HC RX REV CODE- 250/637: Performed by: ANESTHESIOLOGY

## 2019-03-13 PROCEDURE — 76040000026: Performed by: INTERNAL MEDICINE

## 2019-03-13 PROCEDURE — 77030013992 HC SNR POLYP ENDOSC BSC -B: Performed by: INTERNAL MEDICINE

## 2019-03-13 PROCEDURE — 74011250636 HC RX REV CODE- 250/636

## 2019-03-13 PROCEDURE — 74011250636 HC RX REV CODE- 250/636: Performed by: ANESTHESIOLOGY

## 2019-03-13 RX ORDER — PROPOFOL 10 MG/ML
INJECTION, EMULSION INTRAVENOUS
Status: DISCONTINUED | OUTPATIENT
Start: 2019-03-13 | End: 2019-03-13 | Stop reason: HOSPADM

## 2019-03-13 RX ORDER — SODIUM CHLORIDE, SODIUM LACTATE, POTASSIUM CHLORIDE, CALCIUM CHLORIDE 600; 310; 30; 20 MG/100ML; MG/100ML; MG/100ML; MG/100ML
100 INJECTION, SOLUTION INTRAVENOUS CONTINUOUS
Status: DISCONTINUED | OUTPATIENT
Start: 2019-03-13 | End: 2019-03-13 | Stop reason: HOSPADM

## 2019-03-13 RX ORDER — PROPOFOL 10 MG/ML
INJECTION, EMULSION INTRAVENOUS AS NEEDED
Status: DISCONTINUED | OUTPATIENT
Start: 2019-03-13 | End: 2019-03-13 | Stop reason: HOSPADM

## 2019-03-13 RX ORDER — ERYTHROMYCIN 5 MG/G
OINTMENT OPHTHALMIC
Status: COMPLETED | OUTPATIENT
Start: 2019-03-13 | End: 2019-03-13

## 2019-03-13 RX ORDER — METOPROLOL SUCCINATE 50 MG/1
50 TABLET, EXTENDED RELEASE ORAL
Status: COMPLETED | OUTPATIENT
Start: 2019-03-13 | End: 2019-03-13

## 2019-03-13 RX ORDER — SODIUM CHLORIDE 9 MG/ML
10 INJECTION, SOLUTION INTRAVENOUS CONTINUOUS
Status: DISCONTINUED | OUTPATIENT
Start: 2019-03-13 | End: 2019-03-13 | Stop reason: HOSPADM

## 2019-03-13 RX ORDER — LIDOCAINE HYDROCHLORIDE 20 MG/ML
INJECTION, SOLUTION EPIDURAL; INFILTRATION; INTRACAUDAL; PERINEURAL AS NEEDED
Status: DISCONTINUED | OUTPATIENT
Start: 2019-03-13 | End: 2019-03-13 | Stop reason: HOSPADM

## 2019-03-13 RX ORDER — GENTAMICIN SULFATE 0.3 %
0.5 OINTMENT (GRAM) OPHTHALMIC (EYE) 3 TIMES DAILY
Status: DISCONTINUED | OUTPATIENT
Start: 2019-03-13 | End: 2019-03-13

## 2019-03-13 RX ADMIN — PROPOFOL 27 MG: 10 INJECTION, EMULSION INTRAVENOUS at 08:32

## 2019-03-13 RX ADMIN — SODIUM CHLORIDE, SODIUM LACTATE, POTASSIUM CHLORIDE, AND CALCIUM CHLORIDE 100 ML/HR: 600; 310; 30; 20 INJECTION, SOLUTION INTRAVENOUS at 07:47

## 2019-03-13 RX ADMIN — PROPOFOL 140 MCG/KG/MIN: 10 INJECTION, EMULSION INTRAVENOUS at 08:30

## 2019-03-13 RX ADMIN — PROPOFOL 45 MG: 10 INJECTION, EMULSION INTRAVENOUS at 08:30

## 2019-03-13 RX ADMIN — ERYTHROMYCIN: 5 OINTMENT OPHTHALMIC at 11:00

## 2019-03-13 RX ADMIN — LIDOCAINE HYDROCHLORIDE 100 MG: 20 INJECTION, SOLUTION EPIDURAL; INFILTRATION; INTRACAUDAL; PERINEURAL at 08:30

## 2019-03-13 RX ADMIN — PROPOFOL 27 MG: 10 INJECTION, EMULSION INTRAVENOUS at 08:33

## 2019-03-13 RX ADMIN — METOPROLOL SUCCINATE 50 MG: 50 TABLET, EXTENDED RELEASE ORAL at 08:25

## 2019-03-13 RX ADMIN — FAMOTIDINE 20 MG: 10 INJECTION, SOLUTION INTRAVENOUS at 07:55

## 2019-03-13 NOTE — ROUTINE PROCESS
PATIENT DISCHARGED HOME VIA WHEELCHAIR WITH FAMILY. VSS AT DISCHARGE. DISCHARGE INSTRUCTIONS GIVEN TO FAMILY AND PATIENT.

## 2019-03-13 NOTE — PROGRESS NOTES
Order changed to erythromicin per Dr. Sigrid Us due to pharmacy request.  Waiting on meds. Dr. Sigrid Us ok'd for patient to get dressed and be off monitor.

## 2019-03-13 NOTE — PROCEDURES
PROCEDURE: Colonoscopy with snare polypectomy and submucosal injection for tattoo    ENDOSCOPIST: Jennifer Bernal M.D. PREOPERATIVE DIAGNOSIS: Screening, h/o breast CA    POSTOPERATIVE DIAGNOSIS: Polyps, diverticulosis, internal hemorrhoids    SEDATION: MAC    INSTRUMENT: [id number]    DESCRIPTION OF PROCEDURE:  After informed consent was obtained the patient was placed in the left lateral decubitus position. Intravenous sedation was administered as above. After adequate sedation had been achieved, digital rectal examination was performed. The colonoscope was then inserted through the anus and followed the course of the rectum and colon to the cecum, which was confirmed by visualization of the ileocecal valve and appendiceal orifice. The colonoscope was withdrawn from that point, performing a careful survey of the mucosa. Retroflexion was performed in the rectum. FINDINGS:  Good prep  Terminal Ileum:  normal    Colon: 2 cm complex sessile polyp wrapping around very fatty ileocecal valve, removed piecemeal with hot snare. [place] ink tattoos placed at site. Diminutive polyp of AC removed with cold snare. Examination included retroflexed views of the cecum and ascending. Scattered diverticulosis noted throughout. Rectum: internal hemorrhoids noted. Estimated Blood Loss:  0 cc-min    IMPRESSION:  Large complex polyp removed piecemeal.      PLAN:  Repeat exam in 6 months to confirm complete resection    ZAK Rendon MD

## 2019-03-13 NOTE — ANESTHESIA POSTPROCEDURE EVALUATION
Procedure(s):  COLONOSCOPY/ 32  ENDOSCOPIC POLYPECTOMY  INJECTION. Anesthesia Post Evaluation      Multimodal analgesia: multimodal analgesia not used between 6 hours prior to anesthesia start to PACU discharge  Patient location during evaluation: bedside  Patient participation: complete - patient participated  Level of consciousness: awake  Pain management: adequate  Airway patency: patent  Anesthetic complications: no  Cardiovascular status: acceptable and stable  Respiratory status: acceptable and room air  Hydration status: acceptable  Comments: Pt developed pain in left eye prior to discharge. Both eyes have red sclera with left greater than right. No obvious defect noted on exam.  Garamycin ophthalmic ointment and patch ordered. Discussed with patient and . Dr. Erlinda Cotton aware. Pt to call if not resolved by tomorrow morning. Post anesthesia nausea and vomiting:  none      Visit Vitals  /60   Pulse 94   Temp 36.6 °C (97.8 °F)   Resp 16   Ht 5' 5\" (1.651 m)   Wt 87.1 kg (192 lb)   SpO2 98%   Breastfeeding?  No   BMI 31.95 kg/m²

## 2019-03-13 NOTE — DISCHARGE INSTRUCTIONS
Gastrointestinal Colonoscopy/Flexible Sigmoidoscopy - Lower Exam Discharge Instructions  1. Call Dr. Corina Damon at [phone] for any problems or questions. 2. Contact the doctors office for follow up appointment as directed  3. Medication may cause drowsiness for several hours, therefore, do not drive or operate machinery for remainder of the day. 4. No alcohol today. 5. Ordinarily, you may resume regular diet and activity after exam unless otherwise specified by your physician. 6. Because of air put into your colon during exam, you may experience some abdominal distension, relieved by the passage of gas, for several hours. 7. Contact your physician if you have any of the following:  a. Excessive amount of bleeding - large amount when having a bowel movement. Occasional specks of blood with bowel movement would not be unusual.  b. Severe abdominal pain  c. Fever or Chills  8. Polyp Removal - follow these additional instructions  a. Clear liquid diet for the next meal (jell-o, broth, clear drinks)  b. Soft diet for 24 hours, then resume regular diet   Any additional instructions:  PLAN:  Repeat exam in 6 months to confirm complete resection          Instructions given to Shawn Pepper and other family members.   Instructions given by:

## 2019-03-13 NOTE — ANESTHESIA PREPROCEDURE EVALUATION
Anesthetic History   No history of anesthetic complications            Review of Systems / Medical History  Patient summary reviewed and pertinent labs reviewed    Pulmonary    COPD: moderate      Smoker         Neuro/Psych     seizures (last 2017): well controlled         Cardiovascular              Hyperlipidemia    Exercise tolerance: >4 METS     GI/Hepatic/Renal     GERD: well controlled      PUD     Endo/Other        Morbid obesity and arthritis     Other Findings   Comments: LBP    Spinal Cord Stimulator           Physical Exam    Airway  Mallampati: II  TM Distance: 4 - 6 cm  Neck ROM: normal range of motion   Mouth opening: Normal     Cardiovascular  Regular rate and rhythm,  S1 and S2 normal,  no murmur, click, rub, or gallop  Rhythm: regular  Rate: normal         Dental  No notable dental hx       Pulmonary  Breath sounds clear to auscultation               Abdominal  GI exam deferred       Other Findings            Anesthetic Plan    ASA: 3  Anesthesia type: total IV anesthesia          Induction: Intravenous

## 2019-03-13 NOTE — PROGRESS NOTES
Patient complaining of left eye discomfort. \"feels like a blob is in it\". Spoke with Dr. Bindu Gonzalez about this and she is coming to look. Meantime ok to flush with normal saline. Continuing to monitor.

## 2019-03-13 NOTE — H&P
Gastroenterology Outpatient History and Physical    Patient: Marcia Wheeler    Physician: Rose Marie Castro MD    Vital Signs: Blood pressure (!) 136/97, pulse (!) 108, temperature 98.5 °F (36.9 °C), resp. rate 18, height 5' 5\" (1.651 m), weight 87.1 kg (192 lb), last menstrual period 06/07/2015, SpO2 96 %, not currently breastfeeding. Allergies: Allergies   Allergen Reactions    Levaquin [Levofloxacin] Rash    Lyrica [Pregabalin] Swelling    Oxycodone Itching     Pt states not allergic to    Sulfa (Sulfonamide Antibiotics) Itching and Nausea Only       Chief Complaint: CRC screening    History of Present Illness: remote colonoscopy. H/o breast CA    Justification for Procedure: screening    History:  Past Medical History:   Diagnosis Date    Anxiety     daily meds    Arthritis     Atypical ductal hyperplasia of right breast 12/12/2016    Breast [facility] [address]     Cancer ([facility] [address]     right breast    Carpal tunnel syndrome     right     Chronic obstructive pulmonary disease (HCC)     does have inhalers at this time and nebulizer    Chronic pain     back    Claustrophobia     DDD (degenerative disc disease), lumbar     pt denies    Ductal carcinoma in situ (DCIS) of right breast 1/17/2017    Seeing Dr. Sophie Moreria.     GERD (gastroesophageal reflux disease)     take medication    History of cigar smoking     History of peptic ulcer     years ago    History of URI (upper respiratory infection)     History of vitamin D deficiency     Insomnia     Lateral epicondylitis  of elbow     Lumbago     Lumbar radiculopathy     Lumbar stenosis with neurogenic claudication 6/11/2015    Memory difficulty     Mixed hyperlipidemia     Myopathy     Neuroma of foot     right     Nicotine vapor product user     Obesity (BMI 30.0-34.9)     33.7    Other hyperalimentation     Papule     Poor historian     Pure hypercholesterolemia     diet controlled     Recurrent major depressive disorder, in full remission ([facility] 04/27/2016    Sees Dr. Sanya Joiner. H/o cutting. Therapist Anabel Helms.      Seizure disorder ([facility]     last seizure -last grand mal july-2017 x 5-; managed with medication; followed by Dr Mali Batista (neuro)    Smoker     36 yr hx  1 1/2 pk per day    Tobacco abuse       Past Surgical History:   Procedure Laterality Date    HX BACK SURGERY  6/15 latest    x4: 2 ruptured one was sciatic nerve; sees Dr. Kimberly Barrios  11/2016    dr Viola Quiroz frequency ablation    HX BACK SURGERY  08/2017    HX BACK SURGERY  12/2018    HX BREAST BIOPSY Bilateral     benign    HX BREAST BIOPSY Right 1/6/2017    RIGHT BREAST NEEDLE LOCALIZED BIOPSY/ ARRIVE @ 1000 TO PREOP/ BREAST CENTER @ 36 FOR RIGHT MAMMO NEEDLE LOC performed by Jose Maria Cason MD at [address] HX BREAST LUMPECTOMY Right 1/6/2017    RIGHT BREAST LUMPECTOMY performed by Jose Maria Cason MD at [address] HX BREAST LUMPECTOMY Right 2/16/2017    EXCISION OF SUPERIOR LUMPECTOMY MARGIN/ RIGHT BREAST performed by Jose Maria Cason MD at [address] HX BUNIONECTOMY Left     as well as other procedures- bone spur    HX CARPAL TUNNEL RELEASE Bilateral     HX COLONOSCOPY  04/2010    HX CYST REMOVAL Right     breast     HX HEENT      cyst and polyps from vocal cords    HX TUBAL LIGATION      TOM[facility]S BIOPSY BREAST STEREOTACTIC Right 11/28/2016      Social History     Socioeconomic History    Marital status:      Spouse name: Not on file    Number of children: Not on file    Years of education: Not on file    Highest education level: Not on file   Tobacco Use    Smoking status: Current Every Day Smoker     Packs/day: 0.50     Years: 40.00     Pack years: 20.00    Smokeless tobacco: Never Used    Tobacco comment: tobacco and e-cigarette; recently cut down to 0.5 ppd   Substance and Sexual Activity    Alcohol use: No    Drug use: No   Other Topics Concern      Family History   Problem Relation Age of Onset    Cancer Brother         Esophageal    Hypertension Mother     Heart Disease Father     Pacemaker Father     Hypertension Father     Alcohol abuse Father     Lung Disease Father         copd    Diabetes Brother     Stroke Brother        Medications:   Prior to Admission medications    Medication Sig Start Date End Date Taking? Authorizing Provider   iron chinoep-I-[id number]-Ca-suc (FERREX 150 FORTE PLUS) 064-36-49-6 mg-mg-mcg-mg cap cap Take 1 Cap by mouth daily. Provider, Historical   famotidine (PEPCID) 40 mg tablet Take 1 Tab by mouth daily. 1/16/19   Mango Harrell MD   OTHER Handicapped sticker 1/16/19   Mango Harrell MD   traZODone (DESYREL) 100 mg tablet Take 100 mg by mouth three (3) times daily. Provider, Historical   cyclobenzaprine (FLEXERIL) 10 mg tablet Take 1 Tab by mouth three (3) times daily as needed for Muscle Spasm(s). 7/23/18   Mango Harrell MD   anastrozole (ARIMIDEX) 1 mg tablet TAKE ONE TABLET BY MOUTH EVERY DAY 7/3/18   Nate Cerna MD   metoprolol succinate (TOPROL-XL) 50 mg XL tablet Take 1 Tab by mouth daily. 5/4/18   Elizabeth Lucas MD   naproxen sodium (ALEVE) 220 mg cap Take  by mouth as needed. Provider, Historical   DULoxetine (CYMBALTA) 30 mg capsule Take 30 mg by mouth three (3) times daily. Provider, Historical   zonisamide (ZONEGRAN) 100 mg capsule Take 100 mg by mouth three (3) times daily. titrating up. Not taking yet. Provider, Historical   albuterol (PROVENTIL HFA, VENTOLIN HFA, PROAIR HFA) 90 mcg/actuation inhaler Take 1 Puff by inhalation every four (4) hours as needed for Wheezing. 12/12/17   Mango Harrell MD   albuterol (PROVENTIL VENTOLIN) 2.5 mg /3 mL (0.083 %) nebulizer solution 3 mL by Nebulization route every four (4) hours as needed for Wheezing. 12/12/17   Mango Harrell MD   lacosamide (VIMPAT) 200 mg tab tablet Take 200 mg by mouth two (2) times a day.  Indications: take on the [facility]    Provider, Historical       Physical Exam:   General: alert, cooperative, no distress   HEENT: Head: Normocephalic, no lesions, without obvious abnormality.    Heart: regular rate and rhythm, S1, S2 normal, no murmur, click, rub or gallop   Lungs: harsh breath sounds noted diffusely throughout both lungs   Abdominal: Bowel sounds are normal, liver is not enlarged, spleen is not enlarged   Neurological: Grossly normal   Extremities: extremities normal, atraumatic, no cyanosis or edema     Findings/Diagnosis: high risk CRC screening    Plan of Care/Planned Procedure: colonoscopy    Signed By: Sunshine Elder MD     March 13, 2019

## 2019-07-22 ENCOUNTER — HOSPITAL ENCOUNTER (OUTPATIENT)
Dept: MAMMOGRAPHY | Age: 59
Discharge: HOME | End: 2019-07-22
Attending: INTERNAL MEDICINE
Payer: MEDICARE

## 2019-07-22 DIAGNOSIS — Z78.0 ASYMPTOMATIC MENOPAUSAL STATE: ICD-10-CM

## 2019-07-22 DIAGNOSIS — Z13.820 SCREENING FOR OSTEOPOROSIS: ICD-10-CM

## 2019-07-22 PROCEDURE — 77080 DXA BONE DENSITY AXIAL: CPT

## 2019-08-08 ENCOUNTER — HOSPITAL ENCOUNTER (OUTPATIENT)
Dept: LAB | Age: 59
Discharge: HOME | End: 2019-08-08
Payer: MEDICARE

## 2019-08-08 DIAGNOSIS — D50.9 IRON DEFICIENCY ANEMIA, UNSPECIFIED IRON DEFICIENCY ANEMIA TYPE: ICD-10-CM

## 2019-08-08 LAB
ALBUMIN SERPL-MCNC: 4 G/DL (ref 3.5–5)
ALBUMIN/GLOB SERPL: 1.3 {RATIO} (ref 1.2–3.5)
ALP SERPL-CCNC: 118 U/L (ref 50–136)
ALT SERPL-CCNC: 20 U/L (ref 12–65)
ANION GAP SERPL CALC-SCNC: 6 MMOL/L (ref 7–16)
AST SERPL-CCNC: 11 U/L (ref 15–37)
BASOPHILS # BLD: 0.1 K/UL (ref 0–0.2)
BASOPHILS NFR BLD: 0 % (ref 0–2)
BILIRUB SERPL-MCNC: 0.4 MG/DL (ref 0.2–1.1)
BUN SERPL-MCNC: 12 MG/DL (ref 6–23)
CALCIUM SERPL-MCNC: 8.4 MG/DL (ref 8.3–10.4)
CHLORIDE SERPL-SCNC: 109 MMOL/L (ref 98–107)
CO2 SERPL-SCNC: 24 MMOL/L (ref 21–32)
CREAT SERPL-MCNC: 0.69 MG/DL (ref 0.6–1)
DIFFERENTIAL METHOD BLD: ABNORMAL
EOSINOPHIL # BLD: 0.1 K/UL (ref 0–0.8)
EOSINOPHIL NFR BLD: 1 % (ref 0.5–7.8)
ERYTHROCYTE [DISTWIDTH] IN BLOOD BY AUTOMATED COUNT: 12.2 % (ref 11.9–14.6)
FERRITIN SERPL-MCNC: 197 NG/ML (ref 8–388)
GLOBULIN SER CALC-MCNC: 3 G/DL (ref 2.3–3.5)
GLUCOSE SERPL-MCNC: 90 MG/DL (ref 65–100)
HCT VFR BLD AUTO: 45.4 % (ref 35.8–46.3)
HGB BLD-MCNC: 15.4 G/DL (ref 11.7–15.4)
HGB RETIC QN AUTO: 37 PG (ref 29–35)
IMM GRANULOCYTES # BLD AUTO: 0.1 K/UL (ref 0–0.5)
IMM GRANULOCYTES NFR BLD AUTO: 0 % (ref 0–5)
IMM RETICS NFR: 11.7 % (ref 3–15.9)
IRON SATN MFR SERPL: 21 %
IRON SERPL-MCNC: 65 UG/DL (ref 35–150)
LYMPHOCYTES # BLD: 2.7 K/UL (ref 0.5–4.6)
LYMPHOCYTES NFR BLD: 17 % (ref 13–44)
MCH RBC QN AUTO: 31.8 PG (ref 26.1–32.9)
MCHC RBC AUTO-ENTMCNC: 33.9 G/DL (ref 31.4–35)
MCV RBC AUTO: 93.8 FL (ref 79.6–97.8)
MONOCYTES # BLD: 0.8 K/UL (ref 0.1–1.3)
MONOCYTES NFR BLD: 5 % (ref 4–12)
NEUTS SEG # BLD: 12.3 K/UL (ref 1.7–8.2)
NEUTS SEG NFR BLD: 77 % (ref 43–78)
NRBC # BLD: 0 K/UL (ref 0–0.2)
PLATELET # BLD AUTO: 313 K/UL (ref 150–450)
PMV BLD AUTO: 8.3 FL (ref 9.4–12.3)
POTASSIUM SERPL-SCNC: 3.9 MMOL/L (ref 3.5–5.1)
PROT SERPL-MCNC: 7 G/DL (ref 6.3–8.2)
RBC # BLD AUTO: 4.84 M/UL (ref 4.05–5.25)
RETICS # AUTO: 0.12 M/UL (ref 0.03–0.1)
RETICS/RBC NFR AUTO: 2.4 % (ref 0.3–2)
SODIUM SERPL-SCNC: 139 MMOL/L (ref 136–145)
TIBC SERPL-MCNC: 314 UG/DL (ref 250–450)
WBC # BLD AUTO: 16.1 K/UL (ref 4.3–11.1)

## 2019-08-08 PROCEDURE — 83540 ASSAY OF IRON: CPT

## 2019-08-08 PROCEDURE — 36415 COLL VENOUS BLD VENIPUNCTURE: CPT

## 2019-08-08 PROCEDURE — 85025 COMPLETE CBC W/AUTO DIFF WBC: CPT

## 2019-08-08 PROCEDURE — 85046 RETICYTE/HGB CONCENTRATE: CPT

## 2019-08-08 PROCEDURE — 82728 ASSAY OF FERRITIN: CPT

## 2019-08-08 PROCEDURE — 80053 COMPREHEN METABOLIC PANEL: CPT

## 2019-11-12 ENCOUNTER — HOSPITAL ENCOUNTER (OUTPATIENT)
Dept: MAMMOGRAPHY | Age: 59
Discharge: HOME | End: 2019-11-12
Attending: FAMILY MEDICINE
Payer: MEDICARE

## 2019-11-12 DIAGNOSIS — Z12.31 VISIT FOR SCREENING MAMMOGRAM: ICD-10-CM

## 2019-11-12 PROCEDURE — 77067 SCR MAMMO BI INCL CAD: CPT

## 2019-11-18 ENCOUNTER — HOSPITAL ENCOUNTER (OUTPATIENT)
Dept: LAB | Age: 59
Discharge: HOME | End: 2019-11-18
Payer: MEDICARE

## 2019-11-18 DIAGNOSIS — D05.11 DUCTAL CARCINOMA IN SITU (DCIS) OF RIGHT BREAST: ICD-10-CM

## 2019-11-18 DIAGNOSIS — D50.9 IRON DEFICIENCY ANEMIA, UNSPECIFIED IRON DEFICIENCY ANEMIA TYPE: ICD-10-CM

## 2019-11-18 LAB
ALBUMIN SERPL-MCNC: 3.7 G/DL (ref 3.5–5)
ALBUMIN/GLOB SERPL: 1.1 {RATIO} (ref 1.2–3.5)
ALP SERPL-CCNC: 116 U/L (ref 50–136)
ALT SERPL-CCNC: 17 U/L (ref 12–65)
ANION GAP SERPL CALC-SCNC: 6 MMOL/L (ref 7–16)
AST SERPL-CCNC: 10 U/L (ref 15–37)
BASOPHILS # BLD: 0.1 K/UL (ref 0–0.2)
BASOPHILS NFR BLD: 1 % (ref 0–2)
BILIRUB SERPL-MCNC: 0.4 MG/DL (ref 0.2–1.1)
BUN SERPL-MCNC: 16 MG/DL (ref 6–23)
CALCIUM SERPL-MCNC: 8.4 MG/DL (ref 8.3–10.4)
CHLORIDE SERPL-SCNC: 113 MMOL/L (ref 98–107)
CO2 SERPL-SCNC: 23 MMOL/L (ref 21–32)
CREAT SERPL-MCNC: 0.85 MG/DL (ref 0.6–1)
DIFFERENTIAL METHOD BLD: ABNORMAL
EOSINOPHIL # BLD: 0.1 K/UL (ref 0–0.8)
EOSINOPHIL NFR BLD: 1 % (ref 0.5–7.8)
ERYTHROCYTE [DISTWIDTH] IN BLOOD BY AUTOMATED COUNT: 12.7 % (ref 11.9–14.6)
FERRITIN SERPL-MCNC: 172 NG/ML (ref 8–388)
GLOBULIN SER CALC-MCNC: 3.3 G/DL (ref 2.3–3.5)
GLUCOSE SERPL-MCNC: 81 MG/DL (ref 65–100)
HCT VFR BLD AUTO: 49.8 % (ref 35.8–46.3)
HGB BLD-MCNC: 16.8 G/DL (ref 11.7–15.4)
HGB RETIC QN AUTO: 37 PG (ref 29–35)
IMM GRANULOCYTES # BLD AUTO: 0.1 K/UL (ref 0–0.5)
IMM GRANULOCYTES NFR BLD AUTO: 1 % (ref 0–5)
IMM RETICS NFR: 7.2 % (ref 3–15.9)
IRON SATN MFR SERPL: 38 %
IRON SERPL-MCNC: 125 UG/DL (ref 35–150)
LYMPHOCYTES # BLD: 2.9 K/UL (ref 0.5–4.6)
LYMPHOCYTES NFR BLD: 19 % (ref 13–44)
MCH RBC QN AUTO: 31.3 PG (ref 26.1–32.9)
MCHC RBC AUTO-ENTMCNC: 33.7 G/DL (ref 31.4–35)
MCV RBC AUTO: 92.9 FL (ref 79.6–97.8)
MONOCYTES # BLD: 1 K/UL (ref 0.1–1.3)
MONOCYTES NFR BLD: 7 % (ref 4–12)
NEUTS SEG # BLD: 11 K/UL (ref 1.7–8.2)
NEUTS SEG NFR BLD: 71 % (ref 43–78)
NRBC # BLD: 0 K/UL (ref 0–0.2)
PLATELET # BLD AUTO: 350 K/UL (ref 150–450)
PMV BLD AUTO: 8.1 FL (ref 9.4–12.3)
POTASSIUM SERPL-SCNC: 3.8 MMOL/L (ref 3.5–5.1)
PROT SERPL-MCNC: 7 G/DL (ref 6.3–8.2)
RBC # BLD AUTO: 5.36 M/UL (ref 4.05–5.25)
RETICS # AUTO: 0.1 M/UL (ref 0.03–0.1)
RETICS/RBC NFR AUTO: 1.8 % (ref 0.3–2)
SODIUM SERPL-SCNC: 142 MMOL/L (ref 136–145)
TIBC SERPL-MCNC: 327 UG/DL (ref 250–450)
WBC # BLD AUTO: 15.1 K/UL (ref 4.3–11.1)

## 2019-11-18 PROCEDURE — 83540 ASSAY OF IRON: CPT

## 2019-11-18 PROCEDURE — 85046 RETICYTE/HGB CONCENTRATE: CPT

## 2019-11-18 PROCEDURE — 85025 COMPLETE CBC W/AUTO DIFF WBC: CPT

## 2019-11-18 PROCEDURE — 80053 COMPREHEN METABOLIC PANEL: CPT

## 2019-11-18 PROCEDURE — 82728 ASSAY OF FERRITIN: CPT

## 2019-11-18 PROCEDURE — 36415 COLL VENOUS BLD VENIPUNCTURE: CPT

## 2020-11-11 ENCOUNTER — HOSPITAL ENCOUNTER (OUTPATIENT)
Dept: CT IMAGING | Age: 60
Discharge: HOME | End: 2020-11-11
Attending: INTERNAL MEDICINE
Payer: MEDICARE

## 2020-11-11 VITALS — HEIGHT: 65 IN | WEIGHT: 155 LBS | BODY MASS INDEX: 25.83 KG/M2

## 2020-11-11 DIAGNOSIS — F17.210 SMOKING GREATER THAN 40 PACK YEARS: ICD-10-CM

## 2020-11-11 PROCEDURE — G0297 LDCT FOR LUNG CA SCREEN: HCPCS

## 2020-11-16 ENCOUNTER — HOSPITAL ENCOUNTER (OUTPATIENT)
Dept: MAMMOGRAPHY | Age: 60
Discharge: HOME | End: 2020-11-16
Attending: INTERNAL MEDICINE
Payer: MEDICARE

## 2020-11-16 DIAGNOSIS — Z12.31 VISIT FOR SCREENING MAMMOGRAM: ICD-10-CM

## 2020-11-16 PROCEDURE — 77063 BREAST TOMOSYNTHESIS BI: CPT

## 2020-11-20 ENCOUNTER — HOSPITAL ENCOUNTER (OUTPATIENT)
Dept: LAB | Age: 60
Discharge: HOME | End: 2020-11-20
Payer: MEDICARE

## 2020-11-20 DIAGNOSIS — D72.829 LEUKOCYTOSIS, UNSPECIFIED TYPE: ICD-10-CM

## 2020-11-20 DIAGNOSIS — E61.1 IRON DEFICIENCY: ICD-10-CM

## 2020-11-20 DIAGNOSIS — F17.210 SMOKING GREATER THAN 40 PACK YEARS: ICD-10-CM

## 2020-11-20 DIAGNOSIS — D05.11 DUCTAL CARCINOMA IN SITU (DCIS) OF RIGHT BREAST: ICD-10-CM

## 2020-11-20 LAB
ALBUMIN SERPL-MCNC: 3.6 G/DL (ref 3.5–5)
ALBUMIN/GLOB SERPL: 1.1 {RATIO} (ref 1.2–3.5)
ALP SERPL-CCNC: 121 U/L (ref 50–136)
ALT SERPL-CCNC: 16 U/L (ref 12–65)
ANION GAP SERPL CALC-SCNC: 7 MMOL/L (ref 7–16)
AST SERPL-CCNC: 10 U/L (ref 15–37)
BASOPHILS # BLD: 0.1 K/UL (ref 0–0.2)
BASOPHILS NFR BLD: 1 % (ref 0–2)
BILIRUB SERPL-MCNC: 0.4 MG/DL (ref 0.2–1.1)
BUN SERPL-MCNC: 19 MG/DL (ref 6–23)
CALCIUM SERPL-MCNC: 9 MG/DL (ref 8.3–10.4)
CHLORIDE SERPL-SCNC: 109 MMOL/L (ref 98–107)
CO2 SERPL-SCNC: 26 MMOL/L (ref 21–32)
CREAT SERPL-MCNC: 0.9 MG/DL (ref 0.6–1)
DIFFERENTIAL METHOD BLD: ABNORMAL
EOSINOPHIL # BLD: 0.1 K/UL (ref 0–0.8)
EOSINOPHIL NFR BLD: 1 % (ref 0.5–7.8)
ERYTHROCYTE [DISTWIDTH] IN BLOOD BY AUTOMATED COUNT: 12.9 % (ref 11.9–14.6)
FERRITIN SERPL-MCNC: 114 NG/ML (ref 8–388)
GLOBULIN SER CALC-MCNC: 3.2 G/DL (ref 2.3–3.5)
GLUCOSE SERPL-MCNC: 88 MG/DL (ref 65–100)
HCT VFR BLD AUTO: 46.7 % (ref 35.8–46.3)
HGB BLD-MCNC: 15.4 G/DL (ref 11.7–15.4)
HGB RETIC QN AUTO: 36 PG (ref 29–35)
IMM GRANULOCYTES # BLD AUTO: 0 K/UL (ref 0–0.5)
IMM GRANULOCYTES NFR BLD AUTO: 0 % (ref 0–5)
IMM RETICS NFR: 5.9 % (ref 3–15.9)
IRON SATN MFR SERPL: 34 %
IRON SERPL-MCNC: 109 UG/DL (ref 35–150)
LDH SERPL L TO P-CCNC: 154 U/L (ref 100–190)
LYMPHOCYTES # BLD: 2.5 K/UL (ref 0.5–4.6)
LYMPHOCYTES NFR BLD: 27 % (ref 13–44)
MCH RBC QN AUTO: 30.9 PG (ref 26.1–32.9)
MCHC RBC AUTO-ENTMCNC: 33 G/DL (ref 31.4–35)
MCV RBC AUTO: 93.6 FL (ref 79.6–97.8)
MONOCYTES # BLD: 0.6 K/UL (ref 0.1–1.3)
MONOCYTES NFR BLD: 7 % (ref 4–12)
NEUTS SEG # BLD: 6.1 K/UL (ref 1.7–8.2)
NEUTS SEG NFR BLD: 64 % (ref 43–78)
NRBC # BLD: 0 K/UL (ref 0–0.2)
PLATELET # BLD AUTO: 321 K/UL (ref 150–450)
PMV BLD AUTO: 8.2 FL (ref 9.4–12.3)
POTASSIUM SERPL-SCNC: 3.8 MMOL/L (ref 3.5–5.1)
PROT SERPL-MCNC: 6.8 G/DL (ref 6.3–8.2)
RBC # BLD AUTO: 4.99 M/UL (ref 4.05–5.25)
RETICS # AUTO: 0.06 M/UL (ref 0.03–0.1)
RETICS/RBC NFR AUTO: 1.3 % (ref 0.3–2)
SODIUM SERPL-SCNC: 142 MMOL/L (ref 136–145)
TIBC SERPL-MCNC: 318 UG/DL (ref 250–450)
WBC # BLD AUTO: 9.4 K/UL (ref 4.3–11.1)

## 2020-11-20 PROCEDURE — 85025 COMPLETE CBC W/AUTO DIFF WBC: CPT

## 2020-11-20 PROCEDURE — 82728 ASSAY OF FERRITIN: CPT

## 2020-11-20 PROCEDURE — 83615 LACTATE (LD) (LDH) ENZYME: CPT

## 2020-11-20 PROCEDURE — 85046 RETICYTE/HGB CONCENTRATE: CPT

## 2020-11-20 PROCEDURE — 80053 COMPREHEN METABOLIC PANEL: CPT

## 2020-11-20 PROCEDURE — 83540 ASSAY OF IRON: CPT

## 2020-11-20 PROCEDURE — 36415 COLL VENOUS BLD VENIPUNCTURE: CPT

## 2021-05-20 ENCOUNTER — HOSPITAL ENCOUNTER (OUTPATIENT)
Dept: LAB | Age: 61
Discharge: HOME | End: 2021-05-20
Payer: MEDICARE

## 2021-05-20 DIAGNOSIS — D05.11 DUCTAL CARCINOMA IN SITU (DCIS) OF RIGHT BREAST: ICD-10-CM

## 2021-05-20 LAB
ALBUMIN SERPL-MCNC: 3.9 G/DL (ref 3.2–4.6)
ALBUMIN/GLOB SERPL: 1.2 {RATIO} (ref 1.2–3.5)
ALP SERPL-CCNC: 138 U/L (ref 50–136)
ALT SERPL-CCNC: 31 U/L (ref 12–65)
ANION GAP SERPL CALC-SCNC: 5 MMOL/L (ref 7–16)
AST SERPL-CCNC: 12 U/L (ref 15–37)
BASOPHILS # BLD: 0.1 K/UL (ref 0–0.2)
BASOPHILS NFR BLD: 0 % (ref 0–2)
BILIRUB SERPL-MCNC: 0.3 MG/DL (ref 0.2–1.1)
BUN SERPL-MCNC: 13 MG/DL (ref 8–23)
CALCIUM SERPL-MCNC: 8.3 MG/DL (ref 8.3–10.4)
CHLORIDE SERPL-SCNC: 110 MMOL/L (ref 98–107)
CO2 SERPL-SCNC: 25 MMOL/L (ref 21–32)
CREAT SERPL-MCNC: 0.8 MG/DL (ref 0.6–1)
DIFFERENTIAL METHOD BLD: ABNORMAL
EOSINOPHIL # BLD: 0.1 K/UL (ref 0–0.8)
EOSINOPHIL NFR BLD: 1 % (ref 0.5–7.8)
ERYTHROCYTE [DISTWIDTH] IN BLOOD BY AUTOMATED COUNT: 13.4 % (ref 11.9–14.6)
GLOBULIN SER CALC-MCNC: 3.3 G/DL (ref 2.3–3.5)
GLUCOSE SERPL-MCNC: 89 MG/DL (ref 65–100)
HCT VFR BLD AUTO: 46.6 % (ref 35.8–46.3)
HGB BLD-MCNC: 15.4 G/DL (ref 11.7–15.4)
IMM GRANULOCYTES # BLD AUTO: 0.1 K/UL (ref 0–0.5)
IMM GRANULOCYTES NFR BLD AUTO: 1 % (ref 0–5)
LYMPHOCYTES # BLD: 2.9 K/UL (ref 0.5–4.6)
LYMPHOCYTES NFR BLD: 14 % (ref 13–44)
MCH RBC QN AUTO: 31.1 PG (ref 26.1–32.9)
MCHC RBC AUTO-ENTMCNC: 33 G/DL (ref 31.4–35)
MCV RBC AUTO: 94.1 FL (ref 79.6–97.8)
MONOCYTES # BLD: 1.1 K/UL (ref 0.1–1.3)
MONOCYTES NFR BLD: 5 % (ref 4–12)
NEUTS SEG # BLD: 17.2 K/UL (ref 1.7–8.2)
NEUTS SEG NFR BLD: 80 % (ref 43–78)
NRBC # BLD: 0 K/UL (ref 0–0.2)
PLATELET # BLD AUTO: 340 K/UL (ref 150–450)
PMV BLD AUTO: 8.3 FL (ref 9.4–12.3)
POTASSIUM SERPL-SCNC: 3.6 MMOL/L (ref 3.5–5.1)
PROT SERPL-MCNC: 7.2 G/DL (ref 6.3–8.2)
RBC # BLD AUTO: 4.95 M/UL (ref 4.05–5.2)
SODIUM SERPL-SCNC: 140 MMOL/L (ref 136–145)
WBC # BLD AUTO: 21.5 K/UL (ref 4.3–11.1)

## 2021-05-20 PROCEDURE — 80053 COMPREHEN METABOLIC PANEL: CPT

## 2021-05-20 PROCEDURE — 85025 COMPLETE CBC W/AUTO DIFF WBC: CPT

## 2021-05-20 PROCEDURE — 36415 COLL VENOUS BLD VENIPUNCTURE: CPT

## 2021-11-17 ENCOUNTER — HOSPITAL ENCOUNTER (OUTPATIENT)
Dept: MAMMOGRAPHY | Age: 61
Discharge: HOME | End: 2021-11-17
Attending: INTERNAL MEDICINE
Payer: MEDICARE

## 2021-11-17 DIAGNOSIS — D05.11 DUCTAL CARCINOMA IN SITU (DCIS) OF RIGHT BREAST: ICD-10-CM

## 2021-11-17 PROCEDURE — 77063 BREAST TOMOSYNTHESIS BI: CPT

## 2021-12-03 ENCOUNTER — HOSPITAL ENCOUNTER (OUTPATIENT)
Dept: LAB | Age: 61
Discharge: HOME | End: 2021-12-03
Payer: MEDICARE

## 2021-12-03 DIAGNOSIS — D05.11 DUCTAL CARCINOMA IN SITU (DCIS) OF RIGHT BREAST: ICD-10-CM

## 2021-12-03 DIAGNOSIS — D50.9 IRON DEFICIENCY ANEMIA, UNSPECIFIED IRON DEFICIENCY ANEMIA TYPE: ICD-10-CM

## 2021-12-03 LAB
ALBUMIN SERPL-MCNC: 3.8 G/DL (ref 3.2–4.6)
ALBUMIN/GLOB SERPL: 1.2 {RATIO} (ref 1.2–3.5)
ALP SERPL-CCNC: 125 U/L (ref 50–136)
ALT SERPL-CCNC: 23 U/L (ref 12–65)
ANION GAP SERPL CALC-SCNC: 6 MMOL/L (ref 7–16)
AST SERPL-CCNC: 12 U/L (ref 15–37)
BASOPHILS # BLD: 0.1 K/UL (ref 0–0.2)
BASOPHILS NFR BLD: 1 % (ref 0–2)
BILIRUB SERPL-MCNC: 0.3 MG/DL (ref 0.2–1.1)
BUN SERPL-MCNC: 13 MG/DL (ref 8–23)
CALCIUM SERPL-MCNC: 8.3 MG/DL (ref 8.3–10.4)
CHLORIDE SERPL-SCNC: 110 MMOL/L (ref 98–107)
CO2 SERPL-SCNC: 23 MMOL/L (ref 21–32)
CREAT SERPL-MCNC: 0.8 MG/DL (ref 0.6–1)
DIFFERENTIAL METHOD BLD: ABNORMAL
EOSINOPHIL # BLD: 0.2 K/UL (ref 0–0.8)
EOSINOPHIL NFR BLD: 2 % (ref 0.5–7.8)
ERYTHROCYTE [DISTWIDTH] IN BLOOD BY AUTOMATED COUNT: 13.2 % (ref 11.9–14.6)
FERRITIN SERPL-MCNC: 57 NG/ML (ref 8–388)
GLOBULIN SER CALC-MCNC: 3.2 G/DL (ref 2.3–3.5)
GLUCOSE SERPL-MCNC: 93 MG/DL (ref 65–100)
HCT VFR BLD AUTO: 44.1 % (ref 35.8–46.3)
HGB BLD-MCNC: 14.8 G/DL (ref 11.7–15.4)
HGB RETIC QN AUTO: 37 PG (ref 29–35)
IMM GRANULOCYTES # BLD AUTO: 0.1 K/UL (ref 0–0.5)
IMM GRANULOCYTES NFR BLD AUTO: 0 % (ref 0–5)
IMM RETICS NFR: 12 % (ref 3–15.9)
IRON SATN MFR SERPL: 22 %
IRON SERPL-MCNC: 73 UG/DL (ref 35–150)
LYMPHOCYTES # BLD: 3.5 K/UL (ref 0.5–4.6)
LYMPHOCYTES NFR BLD: 31 % (ref 13–44)
MCH RBC QN AUTO: 31.2 PG (ref 26.1–32.9)
MCHC RBC AUTO-ENTMCNC: 33.6 G/DL (ref 31.4–35)
MCV RBC AUTO: 92.8 FL (ref 79.6–97.8)
MONOCYTES # BLD: 0.7 K/UL (ref 0.1–1.3)
MONOCYTES NFR BLD: 6 % (ref 4–12)
NEUTS SEG # BLD: 6.7 K/UL (ref 1.7–8.2)
NEUTS SEG NFR BLD: 60 % (ref 43–78)
NRBC # BLD: 0 K/UL (ref 0–0.2)
PLATELET # BLD AUTO: 339 K/UL (ref 150–450)
PMV BLD AUTO: 8.2 FL (ref 9.4–12.3)
POTASSIUM SERPL-SCNC: 4.1 MMOL/L (ref 3.5–5.1)
PROT SERPL-MCNC: 7 G/DL (ref 6.3–8.2)
RBC # BLD AUTO: 4.75 M/UL (ref 4.05–5.2)
RETICS # AUTO: 0.07 M/UL (ref 0.03–0.1)
RETICS/RBC NFR AUTO: 1.5 % (ref 0.3–2)
SODIUM SERPL-SCNC: 139 MMOL/L (ref 136–145)
TIBC SERPL-MCNC: 328 UG/DL (ref 250–450)
WBC # BLD AUTO: 11.2 K/UL (ref 4.3–11.1)

## 2021-12-03 PROCEDURE — 82728 ASSAY OF FERRITIN: CPT

## 2021-12-03 PROCEDURE — 83540 ASSAY OF IRON: CPT

## 2021-12-03 PROCEDURE — 85025 COMPLETE CBC W/AUTO DIFF WBC: CPT

## 2021-12-03 PROCEDURE — 36415 COLL VENOUS BLD VENIPUNCTURE: CPT

## 2021-12-03 PROCEDURE — 85046 RETICYTE/HGB CONCENTRATE: CPT

## 2021-12-03 PROCEDURE — 80053 COMPREHEN METABOLIC PANEL: CPT

## 2021-12-09 ENCOUNTER — HOSPITAL ENCOUNTER (OUTPATIENT)
Dept: INFUSION THERAPY | Age: 61
Discharge: HOME | End: 2021-12-09
Payer: MEDICARE

## 2021-12-09 VITALS
OXYGEN SATURATION: 98 % | HEART RATE: 109 BPM | SYSTOLIC BLOOD PRESSURE: 121 MMHG | DIASTOLIC BLOOD PRESSURE: 78 MMHG | RESPIRATION RATE: 18 BRPM | TEMPERATURE: 98.9 F

## 2021-12-09 DIAGNOSIS — D50.9 IRON DEFICIENCY ANEMIA, UNSPECIFIED IRON DEFICIENCY ANEMIA TYPE: Primary | ICD-10-CM

## 2021-12-09 PROCEDURE — 96361 HYDRATE IV INFUSION ADD-ON: CPT

## 2021-12-09 PROCEDURE — 74011250636 HC RX REV CODE- 250/636: Performed by: INTERNAL MEDICINE

## 2021-12-09 PROCEDURE — 96374 THER/PROPH/DIAG INJ IV PUSH: CPT

## 2021-12-09 PROCEDURE — 74011000258 HC RX REV CODE- 258: Performed by: INTERNAL MEDICINE

## 2021-12-09 RX ORDER — ONDANSETRON 2 MG/ML
8 INJECTION INTRAMUSCULAR; INTRAVENOUS AS NEEDED
Status: DISCONTINUED | OUTPATIENT
Start: 2021-12-09 | End: 2021-12-10 | Stop reason: HOSPADM

## 2021-12-09 RX ORDER — HYDROCORTISONE SODIUM SUCCINATE 100 MG/2ML
100 INJECTION, POWDER, FOR SOLUTION INTRAMUSCULAR; INTRAVENOUS AS NEEDED
Status: DISCONTINUED | OUTPATIENT
Start: 2021-12-09 | End: 2021-12-10 | Stop reason: HOSPADM

## 2021-12-09 RX ORDER — SODIUM CHLORIDE 0.9 % (FLUSH) 0.9 %
10 SYRINGE (ML) INJECTION AS NEEDED
Status: DISCONTINUED | OUTPATIENT
Start: 2021-12-09 | End: 2021-12-10 | Stop reason: HOSPADM

## 2021-12-09 RX ORDER — DIPHENHYDRAMINE HYDROCHLORIDE 50 MG/ML
50 INJECTION, SOLUTION INTRAMUSCULAR; INTRAVENOUS AS NEEDED
Status: DISCONTINUED | OUTPATIENT
Start: 2021-12-09 | End: 2021-12-10 | Stop reason: HOSPADM

## 2021-12-09 RX ADMIN — FERUMOXYTOL 510 MG: 510 INJECTION INTRAVENOUS at 15:21

## 2021-12-09 RX ADMIN — Medication 10 ML: at 16:10

## 2021-12-09 RX ADMIN — SODIUM CHLORIDE 500 ML: 9 INJECTION, SOLUTION INTRAVENOUS at 15:07

## 2021-12-09 RX ADMIN — Medication 10 ML: at 15:07

## 2021-12-09 NOTE — PROGRESS NOTES
Arrived to the [facility]. aTmi completed. Patient tolerated well without acute reaction or issue. Monitored x 30 mins per protocol. Any issues or concerns during appointment: none. Patient aware of next infusion appointment on 12/16/21 at 11 AM.    Discharged home ambulatory.

## 2021-12-09 NOTE — PROGRESS NOTES
Problem: Knowledge Deficit  Goal: *Participate in the learning process  Outcome: Progressing Towards Goal  Goal: *Verbalize description of procedure  Outcome: Progressing Towards Goal  Goal: *Verbalizes understanding and describes medication purposes and frequencies  Outcome: Progressing Towards Goal     Problem: Patient Education: Go to Patient Education Activity  Goal: Patient/Family Education  Outcome: Progressing Towards Goal

## 2021-12-13 ENCOUNTER — HOSPITAL ENCOUNTER (OUTPATIENT)
Dept: CT IMAGING | Age: 61
Discharge: HOME | End: 2021-12-13
Attending: INTERNAL MEDICINE
Payer: MEDICARE

## 2021-12-13 DIAGNOSIS — Z87.891 PERSONAL HISTORY OF TOBACCO USE, PRESENTING HAZARDS TO HEALTH: ICD-10-CM

## 2021-12-13 PROCEDURE — 71271 CT THORAX LUNG CANCER SCR C-: CPT

## 2021-12-16 ENCOUNTER — HOSPITAL ENCOUNTER (OUTPATIENT)
Dept: INFUSION THERAPY | Age: 61
Discharge: HOME | End: 2021-12-16
Payer: MEDICARE

## 2021-12-16 VITALS
RESPIRATION RATE: 16 BRPM | OXYGEN SATURATION: 96 % | TEMPERATURE: 97.9 F | HEART RATE: 99 BPM | SYSTOLIC BLOOD PRESSURE: 111 MMHG | DIASTOLIC BLOOD PRESSURE: 67 MMHG

## 2021-12-16 DIAGNOSIS — D50.9 IRON DEFICIENCY ANEMIA, UNSPECIFIED IRON DEFICIENCY ANEMIA TYPE: Primary | ICD-10-CM

## 2021-12-16 PROCEDURE — 74011250636 HC RX REV CODE- 250/636: Performed by: INTERNAL MEDICINE

## 2021-12-16 PROCEDURE — 96361 HYDRATE IV INFUSION ADD-ON: CPT

## 2021-12-16 PROCEDURE — 96365 THER/PROPH/DIAG IV INF INIT: CPT

## 2021-12-16 PROCEDURE — 74011000258 HC RX REV CODE- 258: Performed by: INTERNAL MEDICINE

## 2021-12-16 RX ORDER — SODIUM CHLORIDE 0.9 % (FLUSH) 0.9 %
10 SYRINGE (ML) INJECTION AS NEEDED
Status: ACTIVE | OUTPATIENT
Start: 2021-12-16 | End: 2021-12-16

## 2021-12-16 RX ADMIN — Medication 10 ML: at 11:35

## 2021-12-16 RX ADMIN — FERUMOXYTOL 510 MG: 510 INJECTION INTRAVENOUS at 11:57

## 2021-12-16 RX ADMIN — SODIUM CHLORIDE 500 ML: 900 INJECTION, SOLUTION INTRAVENOUS at 12:13

## 2021-12-16 NOTE — PROGRESS NOTES
Arrived to the [facility]. Vik completed. Patient tolerated well. Any issues or concerns during appointment: none. Patient to follow up with MD regarding any future appts. Discharged via cane to home.

## 2022-03-16 ENCOUNTER — HOSPITAL ENCOUNTER (OUTPATIENT)
Dept: CT IMAGING | Age: 62
Discharge: HOME | End: 2022-03-16
Attending: INTERNAL MEDICINE
Payer: MEDICARE

## 2022-03-16 DIAGNOSIS — R91.1 PULMONARY NODULE: ICD-10-CM

## 2022-03-16 PROCEDURE — 71250 CT THORAX DX C-: CPT

## 2022-03-18 PROBLEM — D05.11 DUCTAL CARCINOMA IN SITU (DCIS) OF RIGHT BREAST: Status: ACTIVE | Noted: 2017-01-17

## 2022-03-19 PROBLEM — R00.2 PALPITATIONS: Status: ACTIVE | Noted: 2018-05-04

## 2022-03-19 PROBLEM — D50.9 IRON DEFICIENCY ANEMIA: Status: ACTIVE | Noted: 2019-02-07

## 2022-03-19 PROBLEM — R07.2 PRECORDIAL PAIN: Status: ACTIVE | Noted: 2018-06-26

## 2022-04-26 DIAGNOSIS — D05.11 DUCTAL CARCINOMA IN SITU (DCIS) OF RIGHT BREAST: ICD-10-CM

## 2022-04-26 DIAGNOSIS — Z79.811 LONG TERM (CURRENT) USE OF AROMATASE INHIBITORS: Primary | ICD-10-CM

## 2022-04-26 DIAGNOSIS — D50.9 IRON DEFICIENCY ANEMIA, UNSPECIFIED IRON DEFICIENCY ANEMIA TYPE: ICD-10-CM

## 2022-05-31 ENCOUNTER — HOSPITAL ENCOUNTER (OUTPATIENT)
Dept: MAMMOGRAPHY | Age: 62
Discharge: HOME | End: 2022-06-03
Payer: MEDICARE

## 2022-05-31 DIAGNOSIS — Z79.811 LONG TERM CURRENT USE OF AROMATASE INHIBITOR: ICD-10-CM

## 2022-05-31 PROCEDURE — 77080 DXA BONE DENSITY AXIAL: CPT

## 2022-06-09 ENCOUNTER — OFFICE VISIT (OUTPATIENT)
Dept: ONCOLOGY | Age: 62
End: 2022-06-09
Payer: MEDICARE

## 2022-06-09 ENCOUNTER — HOSPITAL ENCOUNTER (OUTPATIENT)
Dept: LAB | Age: 62
Discharge: HOME | End: 2022-06-12
Payer: MEDICARE

## 2022-06-09 VITALS
RESPIRATION RATE: 23 BRPM | TEMPERATURE: 98.5 F | DIASTOLIC BLOOD PRESSURE: 68 MMHG | OXYGEN SATURATION: 96 % | BODY MASS INDEX: 29.28 KG/M2 | HEIGHT: 64 IN | WEIGHT: 171.5 LBS | HEART RATE: 99 BPM | SYSTOLIC BLOOD PRESSURE: 119 MMHG

## 2022-06-09 DIAGNOSIS — D50.9 IRON DEFICIENCY ANEMIA, UNSPECIFIED IRON DEFICIENCY ANEMIA TYPE: ICD-10-CM

## 2022-06-09 DIAGNOSIS — M85.89 OSTEOPENIA OF MULTIPLE SITES: ICD-10-CM

## 2022-06-09 DIAGNOSIS — D72.829 LEUKOCYTOSIS, UNSPECIFIED TYPE: ICD-10-CM

## 2022-06-09 DIAGNOSIS — D05.11 DUCTAL CARCINOMA IN SITU (DCIS) OF RIGHT BREAST: Primary | ICD-10-CM

## 2022-06-09 DIAGNOSIS — D05.11 DUCTAL CARCINOMA IN SITU (DCIS) OF RIGHT BREAST: ICD-10-CM

## 2022-06-09 LAB
ALBUMIN SERPL-MCNC: 4 G/DL (ref 3.2–4.6)
ALBUMIN/GLOB SERPL: 1.2 {RATIO} (ref 1.2–3.5)
ALP SERPL-CCNC: 119 U/L (ref 50–136)
ALT SERPL-CCNC: 25 U/L (ref 12–65)
ANION GAP SERPL CALC-SCNC: 9 MMOL/L (ref 7–16)
AST SERPL-CCNC: 17 U/L (ref 15–37)
BASOPHILS # BLD: 0.1 K/UL (ref 0–0.2)
BASOPHILS NFR BLD: 1 % (ref 0–2)
BILIRUB SERPL-MCNC: 0.5 MG/DL (ref 0.2–1.1)
BUN SERPL-MCNC: 14 MG/DL (ref 8–23)
CALCIUM SERPL-MCNC: 8.8 MG/DL (ref 8.3–10.4)
CHLORIDE SERPL-SCNC: 106 MMOL/L (ref 98–107)
CO2 SERPL-SCNC: 23 MMOL/L (ref 21–32)
CREAT SERPL-MCNC: 0.8 MG/DL (ref 0.6–1)
DIFFERENTIAL METHOD BLD: ABNORMAL
EOSINOPHIL # BLD: 0.1 K/UL (ref 0–0.8)
EOSINOPHIL NFR BLD: 1 % (ref 0.5–7.8)
ERYTHROCYTE [DISTWIDTH] IN BLOOD BY AUTOMATED COUNT: 12.6 % (ref 11.9–14.6)
FERRITIN SERPL-MCNC: 200 NG/ML (ref 8–388)
GLOBULIN SER CALC-MCNC: 3.4 G/DL (ref 2.3–3.5)
GLUCOSE SERPL-MCNC: 97 MG/DL (ref 65–100)
HCT VFR BLD AUTO: 46.6 % (ref 35.8–46.3)
HGB BLD-MCNC: 15.5 G/DL (ref 11.7–15.4)
HGB RETIC QN AUTO: 36 PG (ref 29–35)
IMM GRANULOCYTES # BLD AUTO: 0.1 K/UL (ref 0–0.5)
IMM GRANULOCYTES NFR BLD AUTO: 0 % (ref 0–5)
IMM RETICS NFR: 9.6 % (ref 3–15.9)
IRON SATN MFR SERPL: 39 %
IRON SERPL-MCNC: 131 UG/DL (ref 35–150)
LYMPHOCYTES # BLD: 3 K/UL (ref 0.5–4.6)
LYMPHOCYTES NFR BLD: 18 % (ref 13–44)
MCH RBC QN AUTO: 31.1 PG (ref 26.1–32.9)
MCHC RBC AUTO-ENTMCNC: 33.3 G/DL (ref 31.4–35)
MCV RBC AUTO: 93.4 FL (ref 79.6–97.8)
MONOCYTES # BLD: 0.7 K/UL (ref 0.1–1.3)
MONOCYTES NFR BLD: 5 % (ref 4–12)
NEUTS SEG # BLD: 12.3 K/UL (ref 1.7–8.2)
NEUTS SEG NFR BLD: 75 % (ref 43–78)
NRBC # BLD: 0 K/UL (ref 0–0.2)
PLATELET # BLD AUTO: 344 K/UL (ref 150–450)
PMV BLD AUTO: 8.1 FL (ref 9.4–12.3)
POTASSIUM SERPL-SCNC: 3.9 MMOL/L (ref 3.5–5.1)
PROT SERPL-MCNC: 7.4 G/DL (ref 6.3–8.2)
RBC # BLD AUTO: 4.99 M/UL (ref 4.05–5.2)
RETICS # AUTO: 0.08 M/UL (ref 0.03–0.1)
RETICS/RBC NFR AUTO: 1.6 % (ref 0.3–2)
SODIUM SERPL-SCNC: 138 MMOL/L (ref 136–145)
TIBC SERPL-MCNC: 336 UG/DL (ref 250–450)
WBC # BLD AUTO: 16.3 K/UL (ref 4.3–11.1)

## 2022-06-09 PROCEDURE — 85046 RETICYTE/HGB CONCENTRATE: CPT

## 2022-06-09 PROCEDURE — 83540 ASSAY OF IRON: CPT

## 2022-06-09 PROCEDURE — 85025 COMPLETE CBC W/AUTO DIFF WBC: CPT

## 2022-06-09 PROCEDURE — 82728 ASSAY OF FERRITIN: CPT

## 2022-06-09 PROCEDURE — 3017F COLORECTAL CA SCREEN DOC REV: CPT | Performed by: NURSE PRACTITIONER

## 2022-06-09 PROCEDURE — 80053 COMPREHEN METABOLIC PANEL: CPT

## 2022-06-09 PROCEDURE — G8419 CALC BMI OUT NRM PARAM NOF/U: HCPCS | Performed by: NURSE PRACTITIONER

## 2022-06-09 PROCEDURE — 99214 OFFICE O/P EST MOD 30 MIN: CPT | Performed by: NURSE PRACTITIONER

## 2022-06-09 PROCEDURE — 4004F PT TOBACCO SCREEN RCVD TLK: CPT | Performed by: NURSE PRACTITIONER

## 2022-06-09 PROCEDURE — G8428 CUR MEDS NOT DOCUMENT: HCPCS | Performed by: NURSE PRACTITIONER

## 2022-06-09 PROCEDURE — 36415 COLL VENOUS BLD VENIPUNCTURE: CPT

## 2022-06-09 ASSESSMENT — PATIENT HEALTH QUESTIONNAIRE - PHQ9
SUM OF ALL RESPONSES TO PHQ QUESTIONS 1-9: 0
1. LITTLE INTEREST OR PLEASURE IN DOING THINGS: 0
2. FEELING DOWN, DEPRESSED OR HOPELESS: 0
SUM OF ALL RESPONSES TO PHQ QUESTIONS 1-9: 0
SUM OF ALL RESPONSES TO PHQ9 QUESTIONS 1 & 2: 0

## 2022-06-09 NOTE — PROGRESS NOTES
Data Source: Patient, [facility] record. 6/9/22      Gwendolyn Hernandez [id number]    55 y.o. Patient Encounter: [address] Visit    Cancer Diagnosis:  RT DCIS  Pulmonary nodule  Stage:  0  Performance Status:  1  Code Status:  Not discussed  Onc History (Copied from prior):   56F, previously , on disability r/t multiple back surgeries, post menopausal (reports last period was over 2 years ago),denies HRT, denies family history of breast cancer,  seizure disorder r/t h/o distant trauma, depression (on lexapro), dyslipidemia, osteopenia, COPD (not requiring O2), bilateral, carpal tunnel syndrome, had recently been seen for RT breast pain: bilateral diagnostic mammogram and ultrasound on 11/22/16 revealed RT posterior microcalcifications at 10:30 position for which biopsy was recommended. Underwent needle biopsy on 11/18/16 which showed fibrocystic mastopathy having atypical IDH and microcalcifications. Underwent right lumpectomy w Dr Trace Adkins on 0/9/38, final pathology showing 1 cm x 0.4 cm intermediate grade DCIS (ER +ve 99%, TX +ve 35%) with superior margin being close at 0.1 cm. B/l MRI breast 1/16/17 without other suspicious areas. Re-excision on 2/16/17 performed and did not show any residual disease. She has since been referred to us for further management. Patient is scheduled for back surgery on 3/23/17. Hospice Referral:  NA    Interval History:  6/09/2022: She returns today for 6 month follow up. She has been well since last seen. She denies any new issues or concerns. Her chronic back pain is stable. Her energy is pretty good. Appetite is great. She is down to smoking about 6-7 cigarettes a day from 1.5 packs a day. She admits to being compliant with her Arimidex. No hot flashes. No new breast issues or concerns. Will plan for repeat mammogram in November. Her dexa was performed and showed worsening osteopenia. We discussed starting Prolia.  She is in the midst of getting some dental work done. She will get approval from her dentist and then plan to allow for healing before starting. Plan to start in 6 months at her next apt. Labs reviewed and acceptable. Continue arimidex for now. NCCN Distress Score:  PHQ-9 Total Score: 0 (6/9/2022  9:47 AM)    Pain/Fatigue: Pain Score:   2 (fatigue 4 )      REVIEW OF SYSTEMS:  As mentioned above, all other systems were reviewed in full and are negative. Active Ambulatory Problems     Diagnosis Date Noted    Ductal carcinoma in situ (DCIS) of right breast 01/17/2017    Asthma     Mixed hyperlipidemia     Insomnia     Arthritis     Chronic obstructive pulmonary disease (HCC)     Precordial pain 06/26/2018    Recurrent major depressive disorder, in full remission ([facility] [address] 04/27/2016    Seizure disorder (HCC)     Chronic pain     Memory difficulty     Myopathy     Lumbar stenosis with neurogenic claudication 06/11/2015    Pure hypercholesterolemia     Iron deficiency anemia 02/07/2019    Palpitations 05/04/2018    Anxiety     DDD (degenerative disc disease), lumbar     Osteopenia of multiple sites 06/09/2022     Resolved Ambulatory Problems     Diagnosis Date Noted    Stomach ulcer      Past Medical History:   Diagnosis Date    Atypical ductal hyperplasia of right breast 12/12/2016    Breast cancer ([facility] [address]     Cancer ([facility] [address]     Carpal tunnel syndrome     Claustrophobia     GERD (gastroesophageal reflux disease)     History of cigar smoking     History of peptic ulcer     History of URI (upper respiratory infection)     History of vitamin D deficiency     Lateral epicondylitis  of elbow     Lumbago     Lumbar radiculopathy     Neuroma of foot     Nicotine vapor product user     Obesity (BMI 30.0-34. 9)     Other hyperalimentation     Papule     Poor historian     Smoker     Tobacco abuse      Past Surgical History:   Procedure Laterality Date    BACK SURGERY  6/15 latest    x4: 2 ruptured one was sciatic nerve; sees Dr. Lisette Stuart  11/2016    dr Vik Hidalgo frequency ablation    BACK SURGERY  08/2017    BACK SURGERY  12/2018    BREAST BIOPSY Bilateral     benign    BREAST BIOPSY Right 1/6/2017    RIGHT BREAST NEEDLE LOCALIZED BIOPSY/ ARRIVE @ 1000 TO PREOP/ BREAST CENTER @ 1130 FOR RIGHT MAMMO NEEDLE LOC performed by Anita Funk MD at [address] LUMPECTOMY Right 1/6/2017    RIGHT BREAST LUMPECTOMY performed by Anita Funk MD at [address] LUMPECTOMY Right 2/16/2017    EXCISION OF SUPERIOR LUMPECTOMY MARGIN/ RIGHT BREAST performed by Anita Funk MD at [address] Left     as well as other procedures- bone spur    CARPAL TUNNEL RELEASE Bilateral     COLONOSCOPY  04/2010    COLONOSCOPY  3/13/2019         COLONOSCOPY N/A 3/13/2019    COLONOSCOPY/ 32 performed by Ramez Muñoz MD at [address] [facility] Right     breast     HEENT      cyst and polyps from vocal cords    MARCOS BIOPSY BREAST STEREOTACTIC Right 11/28/2016    ORTHOPEDIC SURGERY  2016    left foot    TUBAL LIGATION         Current Outpatient Medications   Medication Sig Dispense Refill    albuterol sulfate  (90 Base) MCG/ACT inhaler Inhale into the lungs      albuterol (PROVENTIL) (2.5 MG/3ML) 0.083% nebulizer solution Inhale 2.5 mg into the lungs every 4 hours as needed      anastrozole (ARIMIDEX) 1 MG tablet Take 1 mg by mouth daily      DULoxetine (CYMBALTA) 30 MG extended release capsule Take 60 mg by mouth 2 times daily      DULoxetine (CYMBALTA) 60 MG extended release capsule Take 60 mg by mouth 2 times daily      finasteride (PROSCAR) 5 MG tablet Take 5 mg by mouth daily      lacosamide (VIMPAT) 200 MG tablet Take 200 mg by mouth 2 times daily.       Naproxen Sodium 220 MG CAPS Take by mouth as needed      traZODone (DESYREL) 100 MG tablet Take 100 mg by mouth 3 times daily      zonisamide (ZONEGRAN) 100 MG capsule Take 100 mg by mouth 3 times daily      aspirin 81 MG EC tablet Take 81 mg by mouth daily      atorvastatin (LIPITOR) 20 MG tablet Take 20 mg by mouth daily       No current facility-administered medications for this visit. Social History     Socioeconomic History    Marital status:      Spouse name: Not on file    Number of children: Not on file    Years of education: Not on file    Highest education level: Not on file   Occupational History    Not on file   Tobacco Use    Smoking status: Current Every Day Smoker     Packs/day: 0.25    Smokeless tobacco: Never Used    Tobacco comment: Quit smoking: tobacco; recently cut down to 6 per day   Substance and Sexual Activity    Alcohol use: No    Drug use: No    Sexual activity: Not on file   Other Topics Concern    Not on file   Social History Narrative    Not on file     Social Determinants of Health     Financial Resource Strain:     Difficulty of Paying Living Expenses: Not on file   Food Insecurity:     Worried About Running Out of Food in the Last Year: Not on file    Kofi of Food in the Last Year: Not on file   Transportation Needs:     Lack of Transportation (Medical): Not on file    Lack of Transportation (Non-Medical):  Not on file   Physical Activity:     Days of Exercise per Week: Not on file    Minutes of Exercise per Session: Not on file   Stress:     Feeling of Stress : Not on file   Social Connections:     Frequency of Communication with Friends and Family: Not on file    Frequency of Social Gatherings with Friends and Family: Not on file    Attends Scientologist Services: Not on file    Active Member of Clubs or Organizations: Not on file    Attends Club or Organization Meetings: Not on file    Marital Status: Not on file   Intimate Partner Violence:     Fear of Current or Ex-Partner: Not on file    Emotionally Abused: Not on file    Physically Abused: Not on file    Sexually Abused: Not on file   Housing Stability:     Unable to Pay for Housing in the Last Year: Not on file    Number of Places Lived in the Last Year: Not on file    Unstable Housing in the Last Year: Not on file         Family History   Problem Relation Age of Onset    Breast Cancer Sister 71    Stroke Brother     Diabetes Brother     Hypertension Father     Lung Disease Father         copd    Heart Disease Father     Hypertension Mother     Cancer Brother         Esophageal    Alcohol Abuse Father     Pacemaker Father        Allergies   Allergen Reactions    Oxycodone Itching     Pt states not allergic to    Pregabalin Swelling    Sulfa Antibiotics Itching and Nausea Only    Levofloxacin Rash         PHYSICAL EXAMINATION:  General Appearance: Healthy appearing patient in no acute distress. Vitals reviewed. Visit Vitals  Vitals:    06/09/22 0944   BP: 119/68   Site: Right Upper Arm   Position: Sitting   Cuff Size: Large Adult   Pulse: 99   Resp: 23   Temp: 98.5 °F (36.9 °C)   TempSrc: Oral   SpO2: 96%   Weight: 171 lb 8 oz (77.8 kg)   Height: 5' 4\" (1.626 m)     HEENT: No oral or pharyngeal masses, ulceration or thrush noted, no sinus tenderness. Neck is supple. Lymph Nodes: No lymphadenopathy noted in the occipital, pre and post auricular, cervical, supra and infraclavicular, axillary region. Lungs/Thorax: Clear to auscultation, no accessory muscles of respiration being used. Heart: Regular rate and rhythm, normal S1, S2, no appreciable murmurs, rubs, gallops. Abdomen: Soft, nontender, bowel sounds present, no appreciable hepatosplenomegaly, no palpable masses. Extremeties: Good pulses bilaterally, no peripheral edema. Skin: Normal skin tone with no rash, petechiae, ecchymosis noted. Musculoskeletal: No pain on palpation over bony prominence, no edema, no evidence of gout, no joint or bony deformity. Neurologic: Grossly intact.     LABS/IMAGING:  Hospital Outpatient Visit on 06/09/2022   Component Date Value Ref Range Status    WBC 06/09/2022 16.3* 4.3 - 11.1 K/uL Final    RBC 06/09/2022 4.99  4.05 - 5.2 M/uL Final    Hemoglobin 06/09/2022 15.5* 11.7 - 15.4 g/dL Final    Hematocrit 06/09/2022 46.6* 35.8 - 46.3 % Final    MCV 06/09/2022 93.4  79.6 - 97.8 FL Final    MCH 06/09/2022 31.1  26.1 - 32.9 PG Final    MCHC 06/09/2022 33.3  31.4 - 35.0 g/dL Final    RDW 06/09/2022 12.6  11.9 - 14.6 % Final    Platelets 67/15/5629 344  150 - 450 K/uL Final    MPV 06/09/2022 8.1* 9.4 - 12.3 FL Final    nRBC 06/09/2022 0.00  0.0 - 0.2 K/uL Final    **Note: Absolute NRBC parameter is now reported with Hemogram**    Seg Neutrophils 06/09/2022 75  43 - 78 % Final    Lymphocytes 06/09/2022 18  13 - 44 % Final    Monocytes 06/09/2022 5  4.0 - 12.0 % Final    Eosinophils % 06/09/2022 1  0.5 - 7.8 % Final    Basophils 06/09/2022 1  0.0 - 2.0 % Final    Immature Granulocytes 06/09/2022 0  0.0 - 5.0 % Final    Segs Absolute 06/09/2022 12.3* 1.7 - 8.2 K/UL Final    Absolute Lymph # 06/09/2022 3.0  0.5 - 4.6 K/UL Final    Absolute Mono # 06/09/2022 0.7  0.1 - 1.3 K/UL Final    Absolute Eos # 06/09/2022 0.1  0.0 - 0.8 K/UL Final    Basophils Absolute 06/09/2022 0.1  0.0 - 0.2 K/UL Final    Absolute Immature Granulocyte 06/09/2022 0.1  0.0 - 0.5 K/UL Final    Differential Type 06/09/2022 AUTOMATED    Final    Sodium 06/09/2022 138  136 - 145 mmol/L Final    Potassium 06/09/2022 3.9  3.5 - 5.1 mmol/L Final    Chloride 06/09/2022 106  98 - 107 mmol/L Final    CO2 06/09/2022 23  21 - 32 mmol/L Final    Anion Gap 06/09/2022 9  7 - 16 mmol/L Final    Glucose 06/09/2022 97  65 - 100 mg/dL Final    BUN 06/09/2022 14  8 - 23 MG/DL Final    CREATININE 06/09/2022 0.80  0.6 - 1.0 MG/DL Final    GFR  06/09/2022 >60  >60 ml/min/1.73m2 Final    GFR Non- 06/09/2022 >60  >60 ml/min/1.73m2 Final    Comment:      Estimated GFR is calculated using the Modification of Diet in Renal Disease (MDRD) Study equation, reported for both  Americans (GFRAA) and non- Americans (GFRNA), and normalized to 1.73m2 body surface area. The physician must decide which value applies to the patient. The MDRD study equation should only be used in individuals age 25 or older. It has not been validated for the following: pregnant women, patients with serious comorbid conditions,or on certain medications, or persons with extremes of body size, muscle mass, or nutritional status.  Calcium 06/09/2022 8.8  8.3 - 10.4 MG/DL Final    Total Bilirubin 06/09/2022 0.5  0.2 - 1.1 MG/DL Final    ALT 06/09/2022 25  12 - 65 U/L Final    AST 06/09/2022 17  15 - 37 U/L Final    Alk Phosphatase 06/09/2022 119  50 - 136 U/L Final    Total Protein 06/09/2022 7.4  6.3 - 8.2 g/dL Final    Albumin 06/09/2022 4.0  3.2 - 4.6 g/dL Final    Globulin 06/09/2022 3.4  2.3 - 3.5 g/dL Final    Albumin/Globulin Ratio 06/09/2022 1.2  1.2 - 3.5   Final    Ferritin 06/09/2022 200  8 - 388 NG/ML Final    Reticulocyte Count,Automated 06/09/2022 1.6  0.3 - 2.0 % Final    Absolute Retic # 06/09/2022 0.0813  0.026 - 0.095 M/ul Final    Immature Retic Fraction 06/09/2022 9.6  3.0 - 15.9 % Final    Retic Hemoglobin conc. 06/09/2022 36* 29 - 35 pg Final    Iron 06/09/2022 131  35 - 150 ug/dL Final    Comment: Known Interfering Substances section:  \"Iron values may be falsely elevated in  serum samples from patients with  anticoagulants (e.g., hemodialysis patients). \"  Limitations of Procedure section:  \"Turbidity resulting from precipitation of  fibrinogen in the serum of patients treated  with anticoagulants (e.g. hemodialysis  patients) may cause spuriously elevated  iron results. \"      TIBC 06/09/2022 336  250 - 450 ug/dL Final    TRANSFERRIN SATURATION 06/09/2022 39  >20 % Final       Above results reviewed with patient.        ASSESSMENT:  64 F, previously , on disability r/t multiple back surgeries, post menopausal (reports last period was about 5 years ago), denies HRT, denies family history of breast cancer,  seizure disorder r/t h/o distant trauma, depression (on lexapro), dyslipidemia, osteopenia, COPD (not requiring O2), bilateral, carpal tunnel syndrome, had recently been seen for RT breast pain: bilateral diagnostic mammogram and ultrasound on 11/22/16 revealed RT posterior microcalcifications at 10:30 position for which biopsy was recommended. Underwent needle biopsy on 11/18/16 which showed fibrocystic mastopathy having atypical IDH and microcalcifications. Underwent right lumpectomy w Dr Yoav Mccrary on 0/5/16, final pathology showing 1 cm x 0.4 cm intermediate grade DCIS (ER +ve 99%, CA +ve 35%) with superior margin being close at 0.1 cm. B/l MRI breast 1/16/17 without other suspicious areas. Re-excision on 2/16/17 performed and did not show any residual disease. She has since been referred to us for further management. Patient is scheduled for back surgery on 3/23/17. In summary, pleasant middle aged female with RT sided intermediate grade, 1 cm DCIS s/p resection. Diagnosis, prognosis and therapy options/risk reduction techniques discussed. She would like yo proceed w endocrine therapy. 05/17: Doing well. Finishing XRT in a week. Tolerating Arimidex (post-menopausal, reports last menstrual period approx 5 years ago), some hot flashes. Lipids not fasting today. 11/17: S/p back repeat back surgery 08/17. Regular w Arimidex, recent b/l mammogram -ve, repeat in 1 year. Notes recurring LT medial chest cystic swelling w recurrent drainage over last year or so: will have surgery evaluate, doubt malignancy but may need to be lanced. Mildly elevated triglycerides, on cholesterol medication, copy given to f/u w primary care. Given extensive smoking history, will start annual low dose CT chest for lung malignancy screening. White count elevated over last year: initiate w/u    07/18: Reports doing well.  Continues to have chronic back pain, and plans to get a stimulator placed. Reports regular w arimidex, tolerating well. Due for annual mammgram 11/18. Low dose screening chest CT 01/17 was -ve, repeat in 1 year. Leukocytosis/neutrophilia remains: w/u to date including peripheral blood smear review and flow cytometry -ve. Jak2 exon 12-14 and BCR ABL -ve: denies any recent infections, or steroid use. Options discussed, will proceed w BM biopsy. RTC in 4 months w labs, BM biopsy, b/l mammogram.       12/18: Leukocytosis resolved. BM biopsy unremarkable (30-35% cellularity without any evidence of primary marrow disorder, cytogenetics normal), of note low to absent iron stores reported: denies any bleeding, reports last screening colonoscopy w EGD was approx 2 years ago and was unremarkable: advise to discuss w GI regarding low iron stores, also advised pelvic exam w gyn. Check serum iron stores and then start vitron C bid. Noted some Rt breast tenderness a few months ago, has pet dog that jumps on her chest, recent b/l annual mammogram -ve, due for b/l breast MRI (as new baseline 18 months after XRT). Also due for her low dose CT chest.     0 2/19: Denies any new complaints. Status post recent lower back stimulator placement. Remains on Arimidex, tolerating well. Could not get bilateral breast MRI due to stimulator. Will simply get repeat bilateral annual mammograms 11/19. Mild episodic reactive neutrophilia persists (WBC was normal range last time, mildly high today). Labs with iron deficiency, will plan for IV ferric carboxymaltose x2. She has seen GI with plans for colon/EGD in 3/19. She reports being up-to-date with her pelvic evaluation. Low-dose screening CT chest 1/19 -ve. 8/8/2019: Denies any new lumps or bumps, tolerating Arimidex which she will continue. Recent DEXA scan: Remains osteopenic, continue vitamin D supplementation. Status post IV iron x2, clinically feels improved, iron stores better.   [facility] GI[facility], diverticulosis and IH. Given large complex polyp removed in piecemeal fashion around ileocecal valve, plan to repeat colonoscopy in 6 months (9/19).     7/2/2020: Last mammogram 1//19 unremarkable. Reports regular with her Arimidex once daily. Denies any new lumps or bumps, good p.o. intake and mobility. Now on a keto diet having lost 23 pounds intentionally. Chronic back pain persists. Did not undergo low-dose CT chest (scheduled for 1/20), or repeat colonoscopy. She will be due for bilateral mammogram in 11/20. Educated on importance of following up for her recommended repeat colonoscopy per GI. She reports understanding and will reach out to them. We will see her back in 4 months with labs, iron panel, LD CT chest, bilateral mammogram.    11/20/2020: Patient continues to report doing well. Regular with her Arimidex. Denies any new lumps or bumps. Does report some right flank discomfort below her RT breast crease when she bends over to blow dry her hair, otherwise denies any pain. This is chronic and she feels likely musculoskeletal.  No palpable lesions or adenopathy on exam today. Recent bilateral mammograms negative. Recent low-dose CT chest unremarkable. Labs unremarkable. Iron stores adequate. Patient reassured, continue current care. We will see her back in 6 months. RTC in 6 months with labs, iron panel. 12/3/2021: Since last visit was seen by ENT for bilateral parotid gland lesions picked up incidentally while being worked up for atherosclerotic disease of her neck. 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 Regularly sees neurology for chronic seizure disorder. Remains on Arimidex, reports compliance. Minimal hot flashes. Recent bilateral breast mammogram 11/21 -ve. Exam without palpable lesions, lab work unremarkable low iron stores following: Repeat IV iron x1.   Due for screening low-dose CT chest, will be ordered and followed. We will otherwise see her back in 6 months time with repeat labs, as well as DEXA scan. 3/16/2022: Reports doing well. Her annual low-dose CT chest had shown a new superior segment RLL 9x4 mm nodule, we will plan to monitor it with repeat CT chest 3 months later. Patient now presents to the office with results. CT chest without contrast from today with decreasing size of the RLL nodule, now measuring 5 x 1 mm only. Patient reassured, this is almost certainly benign. We will simply repeat CT chest with her next mammogram in 11/22. She will keep her prior appointment with us in 3 months time with DEXA scan.    6/09/2022: She returns today for 6 month follow up. She has been well since last seen. She denies any new issues or concerns. Her chronic back pain is stable. Her energy is pretty good. Appetite is great. She is down to smoking about 6-7 cigarettes a day from 1.5 packs a day. She admits to being compliant with her Arimidex. No hot flashes. No new breast issues or concerns. Will plan for repeat mammogram in November. Her dexa was performed and showed worsening osteopenia. We discussed starting Prolia. She is in the midst of getting some dental work done. She will get approval from her dentist and then plan to allow for healing before starting. Plan to start in 6 months at her next apt. Labs reviewed and acceptable. Continue arimidex for now. 1. RT DCIS  2. Leukocytosis: episodic   3. Iron deficiency picked up on BM biopsy  4. Significant smoking history      ICD-10-CM    1. Ductal carcinoma in situ (DCIS) of right breast  D05.11 MARCOS Screening Bilateral   2. Osteopenia of multiple sites  M85.89    3. Leukocytosis, unspecified type  D72.829        PLAN:  - Continue Arimidex (since 5/17). - B/l annual mammograms. Next in 11/22.   - Dexa scan 5/17 and 7/19 w osteopenia: Vit D 1000 unit once daily. Repeat dexa in 5/22: worsening osteopenia. Will get dental clearance and start Prolia in six months at her next visit. - Significant smoking history: low dose screening annually, chest CT 1/19 and 11/20 -ve, LD CT chest 12/21 w RLL nodule, now resolving. Repeat CT chest in 3/23. - Iron deficiency on BM biopsy (without anemia) and serum: iv iron prn. Seeing GI: f/u w them as needed. Reports being uptodate w gyn eval.   - Neutrophilia, now resoelved: blood smear, blood flow, BCR and Jak2 (exon 12-14), ESR/CRP, BM biopsy unremarkable. Stable. - Dyslipidemia: per primary care. - Call with any worrisome/concerning symptoms.        RTC in 6 months with labs, iron panel, mammogram.       Joseph Henriquez, [address] [place] [address] Hematology Oncology  [address] [place] [address]  Office : [phone]  Fax : [phone]

## 2022-07-12 ENCOUNTER — TELEPHONE (OUTPATIENT)
Dept: ONCOLOGY | Age: 62
End: 2022-07-12

## 2022-07-12 RX ORDER — ANASTROZOLE 1 MG/1
1 TABLET ORAL DAILY
Qty: 90 TABLET | Refills: 3 | Status: SHIPPED | OUTPATIENT
Start: 2022-07-12

## 2022-07-15 ENCOUNTER — TELEPHONE (OUTPATIENT)
Dept: ORTHOPEDIC SURGERY | Age: 62
End: 2022-07-15

## 2022-07-15 NOTE — TELEPHONE ENCOUNTER
I spoke with patient and set her up with an appt to see Dr. Ivory Balderrama to go over her NCV's. She voiced understanding.

## 2022-07-26 ENCOUNTER — OFFICE VISIT (OUTPATIENT)
Dept: ORTHOPEDIC SURGERY | Age: 62
End: 2022-07-26
Payer: MEDICARE

## 2022-07-26 DIAGNOSIS — M18.11 ARTHRITIS OF CARPOMETACARPAL (CMC) JOINT OF RIGHT THUMB: Primary | ICD-10-CM

## 2022-07-26 PROCEDURE — G8419 CALC BMI OUT NRM PARAM NOF/U: HCPCS | Performed by: ORTHOPAEDIC SURGERY

## 2022-07-26 PROCEDURE — G8428 CUR MEDS NOT DOCUMENT: HCPCS | Performed by: ORTHOPAEDIC SURGERY

## 2022-07-26 PROCEDURE — 4004F PT TOBACCO SCREEN RCVD TLK: CPT | Performed by: ORTHOPAEDIC SURGERY

## 2022-07-26 PROCEDURE — 3017F COLORECTAL CA SCREEN DOC REV: CPT | Performed by: ORTHOPAEDIC SURGERY

## 2022-07-26 PROCEDURE — 99214 OFFICE O/P EST MOD 30 MIN: CPT | Performed by: ORTHOPAEDIC SURGERY

## 2022-07-26 NOTE — PROGRESS NOTES
Orthopaedic Hand Surgery Note    Name: Paloma Orellana  YOB: 1960  Gender: female  MRN: [id number]    CC: Follow up for thumb Carpometacarpal Joint osteoarthritis    HPI: Patient is a 64 y.o. female who is here regarding follow up for thumb CMC osteoarthritis. She says the injection worked well, but only temporarily for the thumb pain. She is also here to review her nerve conduction study. ROS/Meds/PSH/PMH/FH/SH: I personally reviewed the patients standard intake form. Pertinents are discussed in the HPI    Physical Examination:  Musculoskeletal:   Examination of the Right upper extremity demonstrates normal sensation in median, ulnar  and radial distribution, cap refill in all fingers < 5 seconds, positive carpal tunnel compression and Phalen test.  Mild prominence and instability of the base of first metacarpal. CMC grind is positive for pain and crepitus. Thumb MCP joint hyperextends  10 degrees. No pain at the radial styloid. No tenderness at the ring A1 pulley, no locking present    Imaging / Electrodiagnostic Tests:     I independently reviewed and interpreted the patient's nerve conduction study. She has very mild right right carpal tunnel syndrome, with normal right median sensory latency, slightly low amplitude; no evidence of carpal tunnel syndrome on the left    Assessment:     ICD-10-CM    1. Arthritis of carpometacarpal (CMC) joint of right thumb  M18.11           Plan:  We again discussed the diagnosis and different treatment options. We discussed observation, day/night splinting, cortisone injection(s) and surgical reconstruction with trapeziectomy and suspension arthroplasty and the risks and benefits of all were clearly outlined.   We discussed the fact that the vast majority of thumb CMC arthritis causes persistent chronic pain and that surgical reconstruction is the endpoint for patients whose symptoms are not properly alleviated with conservative measures but the vast majority of patients end up having surgery. After discussing all options in detail the patient elects to proceed with surgical treatment. Patient understands risks and benefits of right trapeziectomy and ligament reconstruction with tendon interposition including but not limited to nerve injury, vessel injury, infection, failure to achieve desired results and possible need for additional surgery. Patient understands and wishes to proceed with surgery. On Exam:   The patient is alert and oriented  Cardiovascular: regular rate and rhythm  Respiratory: Non labored breathing       Patient voiced accordance and understanding of the information provided and the formulated plan. All questions were answered to the patient's satisfaction during the encounter.       Tisha Barrow MD  Orthopaedic Surgery  07/26/22  11:19 AM

## 2022-07-26 NOTE — H&P (VIEW-ONLY)
Orthopaedic Hand Surgery Note    Name: Mery Orosco  YOB: 1960  Gender: female  MRN: [id number]    CC: Follow up for thumb Carpometacarpal Joint osteoarthritis    HPI: Patient is a 64 y.o. female who is here regarding follow up for thumb CMC osteoarthritis. She says the injection worked well, but only temporarily for the thumb pain. She is also here to review her nerve conduction study. ROS/Meds/PSH/PMH/FH/SH: I personally reviewed the patients standard intake form. Pertinents are discussed in the HPI    Physical Examination:  Musculoskeletal:   Examination of the Right upper extremity demonstrates normal sensation in median, ulnar  and radial distribution, cap refill in all fingers < 5 seconds, positive carpal tunnel compression and Phalen test.  Mild prominence and instability of the base of first metacarpal. CMC grind is positive for pain and crepitus. Thumb MCP joint hyperextends  10 degrees. No pain at the radial styloid. No tenderness at the ring A1 pulley, no locking present    Imaging / Electrodiagnostic Tests:     I independently reviewed and interpreted the patient's nerve conduction study. She has very mild right right carpal tunnel syndrome, with normal right median sensory latency, slightly low amplitude; no evidence of carpal tunnel syndrome on the left    Assessment:     ICD-10-CM    1. Arthritis of carpometacarpal (CMC) joint of right thumb  M18.11           Plan:  We again discussed the diagnosis and different treatment options. We discussed observation, day/night splinting, cortisone injection(s) and surgical reconstruction with trapeziectomy and suspension arthroplasty and the risks and benefits of all were clearly outlined.   We discussed the fact that the vast majority of thumb CMC arthritis causes persistent chronic pain and that surgical reconstruction is the endpoint for patients whose symptoms are not properly alleviated with conservative measures but the vast majority of patients end up having surgery. After discussing all options in detail the patient elects to proceed with surgical treatment. Patient understands risks and benefits of right trapeziectomy and ligament reconstruction with tendon interposition including but not limited to nerve injury, vessel injury, infection, failure to achieve desired results and possible need for additional surgery. Patient understands and wishes to proceed with surgery. On Exam:   The patient is alert and oriented  Cardiovascular: regular rate and rhythm  Respiratory: Non labored breathing       Patient voiced accordance and understanding of the information provided and the formulated plan. All questions were answered to the patient's satisfaction during the encounter.       Indu Francisco MD  Orthopaedic Surgery  07/26/22  11:19 AM

## 2022-07-29 DIAGNOSIS — M18.11 ARTHRITIS OF CARPOMETACARPAL (CMC) JOINT OF RIGHT THUMB: Primary | ICD-10-CM

## 2022-08-01 ENCOUNTER — TELEPHONE (OUTPATIENT)
Dept: ORTHOPEDIC SURGERY | Age: 62
End: 2022-08-01

## 2022-08-03 DIAGNOSIS — M18.11 ARTHRITIS OF CARPOMETACARPAL (CMC) JOINT OF RIGHT THUMB: Primary | ICD-10-CM

## 2022-08-08 RX ORDER — ALPRAZOLAM 0.5 MG/1
0.5 TABLET ORAL 3 TIMES DAILY PRN
COMMUNITY

## 2022-08-08 RX ORDER — MULTIVITAMIN WITH IRON
250 TABLET ORAL DAILY
COMMUNITY

## 2022-08-08 RX ORDER — CHOLECALCIFEROL (VITAMIN D3) 1250 MCG
CAPSULE ORAL DAILY
COMMUNITY

## 2022-08-08 NOTE — PRE-PROCEDURE INSTRUCTIONS
Patient verified name and . Order for consent was not found in EHR and matches case posting; patient verifies procedure. Type IB surgery, phone assessment complete. Orders were not received. Labs per surgeon: none received. Labs per anesthesia protocol: none. Patient answered medical/surgical history questions at their best of ability. All prior to admission medications documented in [facility]. Patient instructed to take the following medications the day of surgery according to anesthesia guidelines with a small sip of water: Alprazolam, Vimpat, duloxetine, Arimidex. On the day before surgery please take Acetaminophen 1000mg in the morning and then again before bed. You may substitute for Tylenol 650 mg. Hold all vitamins 7 days prior to surgery and NSAIDS 5 days prior to surgery. Prescription meds to hold: Atorvastatin, Aspirin. Patient instructed on the following:    > Arrive at Outpatient Entrance, time of arrival to be called the day before by 1700  > NPO after midnight, unless otherwise indicated, including gum, mints, and ice chips  > Responsible adult must drive patient to the hospital, stay during surgery, and patient will need supervision 24 hours after anesthesia  > Use antibacterial soap in shower the night before surgery and on the morning of surgery  > All piercings must be removed prior to arrival.    > Leave all valuables (money and jewelry) at home but bring insurance card and ID on DOS.   > You may be required to pay a deductible or co-pay on the day of your procedure. You can pre-pay by calling [phone] if your surgery is at the [facility] or [phone] if your surgery is at the [facility]. > Do not wear make-up, nail polish, lotions, cologne, perfumes, powders, or oil on skin. Artificial nails are not permitted.

## 2022-08-09 ENCOUNTER — PREP FOR PROCEDURE (OUTPATIENT)
Dept: ORTHOPEDIC SURGERY | Age: 62
End: 2022-08-09

## 2022-08-09 RX ORDER — SODIUM CHLORIDE 0.9 % (FLUSH) 0.9 %
5-40 SYRINGE (ML) INJECTION EVERY 12 HOURS SCHEDULED
Status: CANCELLED | OUTPATIENT
Start: 2022-08-09

## 2022-08-09 RX ORDER — SODIUM CHLORIDE 9 MG/ML
INJECTION, SOLUTION INTRAVENOUS PRN
Status: CANCELLED | OUTPATIENT
Start: 2022-08-09

## 2022-08-09 RX ORDER — SODIUM CHLORIDE 0.9 % (FLUSH) 0.9 %
5-40 SYRINGE (ML) INJECTION PRN
Status: CANCELLED | OUTPATIENT
Start: 2022-08-09

## 2022-08-10 ENCOUNTER — ANESTHESIA EVENT (OUTPATIENT)
Dept: SURGERY | Age: 62
End: 2022-08-10
Payer: MEDICARE

## 2022-08-10 ENCOUNTER — HOSPITAL ENCOUNTER (OUTPATIENT)
Age: 62
Setting detail: OUTPATIENT SURGERY
Discharge: HOME | End: 2022-08-10
Attending: ORTHOPAEDIC SURGERY | Admitting: ORTHOPAEDIC SURGERY
Payer: MEDICARE

## 2022-08-10 ENCOUNTER — ANESTHESIA (OUTPATIENT)
Dept: SURGERY | Age: 62
End: 2022-08-10
Payer: MEDICARE

## 2022-08-10 VITALS
HEART RATE: 79 BPM | DIASTOLIC BLOOD PRESSURE: 77 MMHG | TEMPERATURE: 97.8 F | BODY MASS INDEX: 28.32 KG/M2 | OXYGEN SATURATION: 97 % | SYSTOLIC BLOOD PRESSURE: 126 MMHG | RESPIRATION RATE: 16 BRPM | WEIGHT: 170 LBS | HEIGHT: 65 IN

## 2022-08-10 DIAGNOSIS — M18.11 ARTHRITIS OF CARPOMETACARPAL (CMC) JOINT OF RIGHT THUMB: ICD-10-CM

## 2022-08-10 PROCEDURE — 2580000003 HC RX 258

## 2022-08-10 PROCEDURE — 2709999900 HC NON-CHARGEABLE SUPPLY: Performed by: ORTHOPAEDIC SURGERY

## 2022-08-10 PROCEDURE — 3700000001 HC ADD 15 MINUTES (ANESTHESIA): Performed by: ORTHOPAEDIC SURGERY

## 2022-08-10 PROCEDURE — 6360000002 HC RX W HCPCS: Performed by: ORTHOPAEDIC SURGERY

## 2022-08-10 PROCEDURE — 2500000003 HC RX 250 WO HCPCS

## 2022-08-10 PROCEDURE — 64417 NJX AA&/STRD AX NERVE IMG: CPT | Performed by: ANESTHESIOLOGY

## 2022-08-10 PROCEDURE — 6360000002 HC RX W HCPCS: Performed by: ANESTHESIOLOGY

## 2022-08-10 PROCEDURE — 6370000000 HC RX 637 (ALT 250 FOR IP): Performed by: ANESTHESIOLOGY

## 2022-08-10 PROCEDURE — 3600000014 HC SURGERY LEVEL 4 ADDTL 15MIN: Performed by: ORTHOPAEDIC SURGERY

## 2022-08-10 PROCEDURE — 2580000003 HC RX 258: Performed by: ANESTHESIOLOGY

## 2022-08-10 PROCEDURE — 26480 TRANSPLANT HAND TENDON: CPT | Performed by: ORTHOPAEDIC SURGERY

## 2022-08-10 PROCEDURE — 7100000010 HC PHASE II RECOVERY - FIRST 15 MIN: Performed by: ORTHOPAEDIC SURGERY

## 2022-08-10 PROCEDURE — 7100000001 HC PACU RECOVERY - ADDTL 15 MIN: Performed by: ORTHOPAEDIC SURGERY

## 2022-08-10 PROCEDURE — 6360000002 HC RX W HCPCS

## 2022-08-10 PROCEDURE — 3600000004 HC SURGERY LEVEL 4 BASE: Performed by: ORTHOPAEDIC SURGERY

## 2022-08-10 PROCEDURE — 7100000000 HC PACU RECOVERY - FIRST 15 MIN: Performed by: ORTHOPAEDIC SURGERY

## 2022-08-10 PROCEDURE — 2500000003 HC RX 250 WO HCPCS: Performed by: ANESTHESIOLOGY

## 2022-08-10 PROCEDURE — 25447 ARTHRP NTRCRP/CRP/MTCR NTRPS: CPT | Performed by: ORTHOPAEDIC SURGERY

## 2022-08-10 PROCEDURE — 3700000000 HC ANESTHESIA ATTENDED CARE: Performed by: ORTHOPAEDIC SURGERY

## 2022-08-10 RX ORDER — ONDANSETRON 2 MG/ML
4 INJECTION INTRAMUSCULAR; INTRAVENOUS
Status: CANCELLED | OUTPATIENT
Start: 2022-08-10 | End: 2022-08-10

## 2022-08-10 RX ORDER — ACETAMINOPHEN 500 MG
1000 TABLET ORAL ONCE
Status: COMPLETED | OUTPATIENT
Start: 2022-08-10 | End: 2022-08-10

## 2022-08-10 RX ORDER — MIDAZOLAM HYDROCHLORIDE 2 MG/2ML
2 INJECTION, SOLUTION INTRAMUSCULAR; INTRAVENOUS
Status: COMPLETED | OUTPATIENT
Start: 2022-08-10 | End: 2022-08-10

## 2022-08-10 RX ORDER — OXYCODONE HYDROCHLORIDE AND ACETAMINOPHEN 5; 325 MG/1; MG/1
1 TABLET ORAL EVERY 6 HOURS PRN
Qty: 20 TABLET | Refills: 0 | Status: SHIPPED | OUTPATIENT
Start: 2022-08-10 | End: 2022-08-17

## 2022-08-10 RX ORDER — SODIUM CHLORIDE 0.9 % (FLUSH) 0.9 %
5-40 SYRINGE (ML) INJECTION EVERY 12 HOURS SCHEDULED
Status: DISCONTINUED | OUTPATIENT
Start: 2022-08-10 | End: 2022-08-10 | Stop reason: HOSPADM

## 2022-08-10 RX ORDER — PROPOFOL 10 MG/ML
INJECTION, EMULSION INTRAVENOUS PRN
Status: DISCONTINUED | OUTPATIENT
Start: 2022-08-10 | End: 2022-08-10 | Stop reason: SDUPTHER

## 2022-08-10 RX ORDER — FENTANYL CITRATE 50 UG/ML
50 INJECTION, SOLUTION INTRAMUSCULAR; INTRAVENOUS EVERY 5 MIN PRN
Status: CANCELLED | OUTPATIENT
Start: 2022-08-10

## 2022-08-10 RX ORDER — HALOPERIDOL 5 MG/ML
1 INJECTION INTRAMUSCULAR
Status: CANCELLED | OUTPATIENT
Start: 2022-08-10 | End: 2022-08-10

## 2022-08-10 RX ORDER — HYDROMORPHONE HYDROCHLORIDE 2 MG/ML
0.5 INJECTION, SOLUTION INTRAMUSCULAR; INTRAVENOUS; SUBCUTANEOUS EVERY 10 MIN PRN
Status: CANCELLED | OUTPATIENT
Start: 2022-08-10

## 2022-08-10 RX ORDER — BUPIVACAINE HYDROCHLORIDE 5 MG/ML
INJECTION, SOLUTION EPIDURAL; INTRACAUDAL
Status: DISCONTINUED | OUTPATIENT
Start: 2022-08-10 | End: 2022-08-10 | Stop reason: SDUPTHER

## 2022-08-10 RX ORDER — LIDOCAINE HYDROCHLORIDE 10 MG/ML
1 INJECTION, SOLUTION INFILTRATION; PERINEURAL
Status: DISCONTINUED | OUTPATIENT
Start: 2022-08-10 | End: 2022-08-10 | Stop reason: HOSPADM

## 2022-08-10 RX ORDER — SODIUM CHLORIDE 0.9 % (FLUSH) 0.9 %
5-40 SYRINGE (ML) INJECTION PRN
Status: DISCONTINUED | OUTPATIENT
Start: 2022-08-10 | End: 2022-08-10 | Stop reason: HOSPADM

## 2022-08-10 RX ORDER — BUPIVACAINE HYDROCHLORIDE 5 MG/ML
INJECTION, SOLUTION EPIDURAL; INTRACAUDAL
Status: COMPLETED | OUTPATIENT
Start: 2022-08-10 | End: 2022-08-10

## 2022-08-10 RX ORDER — SODIUM CHLORIDE, SODIUM LACTATE, POTASSIUM CHLORIDE, CALCIUM CHLORIDE 600; 310; 30; 20 MG/100ML; MG/100ML; MG/100ML; MG/100ML
INJECTION, SOLUTION INTRAVENOUS CONTINUOUS
Status: DISCONTINUED | OUTPATIENT
Start: 2022-08-10 | End: 2022-08-10 | Stop reason: HOSPADM

## 2022-08-10 RX ORDER — LIDOCAINE HYDROCHLORIDE 20 MG/ML
INJECTION, SOLUTION EPIDURAL; INFILTRATION; INTRACAUDAL; PERINEURAL PRN
Status: DISCONTINUED | OUTPATIENT
Start: 2022-08-10 | End: 2022-08-10 | Stop reason: SDUPTHER

## 2022-08-10 RX ORDER — SODIUM CHLORIDE 9 MG/ML
INJECTION, SOLUTION INTRAVENOUS PRN
Status: DISCONTINUED | OUTPATIENT
Start: 2022-08-10 | End: 2022-08-10 | Stop reason: HOSPADM

## 2022-08-10 RX ORDER — OXYCODONE HYDROCHLORIDE 5 MG/1
5 TABLET ORAL
Status: CANCELLED | OUTPATIENT
Start: 2022-08-10 | End: 2022-08-10

## 2022-08-10 RX ADMIN — PROPOFOL 10 MG: 10 INJECTION, EMULSION INTRAVENOUS at 10:16

## 2022-08-10 RX ADMIN — BUPIVACAINE HYDROCHLORIDE 30 ML: 5 INJECTION, SOLUTION EPIDURAL; INTRACAUDAL at 09:11

## 2022-08-10 RX ADMIN — PROPOFOL 10 MG: 10 INJECTION, EMULSION INTRAVENOUS at 10:15

## 2022-08-10 RX ADMIN — LIDOCAINE HYDROCHLORIDE 40 MG: 20 INJECTION, SOLUTION EPIDURAL; INFILTRATION; INTRACAUDAL; PERINEURAL at 10:12

## 2022-08-10 RX ADMIN — SODIUM CHLORIDE, POTASSIUM CHLORIDE, SODIUM LACTATE AND CALCIUM CHLORIDE: 600; 310; 30; 20 INJECTION, SOLUTION INTRAVENOUS at 07:45

## 2022-08-10 RX ADMIN — PROPOFOL 10 MG: 10 INJECTION, EMULSION INTRAVENOUS at 10:13

## 2022-08-10 RX ADMIN — MIDAZOLAM HYDROCHLORIDE 2 MG: 1 INJECTION, SOLUTION INTRAMUSCULAR; INTRAVENOUS at 09:11

## 2022-08-10 RX ADMIN — PROPOFOL 160 MCG/KG/MIN: 10 INJECTION, EMULSION INTRAVENOUS at 10:11

## 2022-08-10 RX ADMIN — PROPOFOL 20 MG: 10 INJECTION, EMULSION INTRAVENOUS at 10:48

## 2022-08-10 RX ADMIN — Medication 2000 MG: at 10:16

## 2022-08-10 RX ADMIN — PHENYLEPHRINE HYDROCHLORIDE 1 ML: 10 INJECTION INTRAVENOUS at 10:55

## 2022-08-10 RX ADMIN — PROPOFOL 40 MG: 10 INJECTION, EMULSION INTRAVENOUS at 10:12

## 2022-08-10 RX ADMIN — PROPOFOL 20 MG: 10 INJECTION, EMULSION INTRAVENOUS at 10:14

## 2022-08-10 RX ADMIN — PHENYLEPHRINE HYDROCHLORIDE 1 ML: 10 INJECTION INTRAVENOUS at 10:31

## 2022-08-10 RX ADMIN — PROPOFOL 10 MG: 10 INJECTION, EMULSION INTRAVENOUS at 10:17

## 2022-08-10 RX ADMIN — BUPIVACAINE HYDROCHLORIDE 30 ML: 5 INJECTION, SOLUTION EPIDURAL; INTRACAUDAL; PERINEURAL at 09:24

## 2022-08-10 RX ADMIN — PHENYLEPHRINE HYDROCHLORIDE 1 ML: 10 INJECTION INTRAVENOUS at 10:42

## 2022-08-10 RX ADMIN — ACETAMINOPHEN 1000 MG: 500 TABLET, FILM COATED ORAL at 09:00

## 2022-08-10 ASSESSMENT — LIFESTYLE VARIABLES: SMOKING_STATUS: 1

## 2022-08-10 ASSESSMENT — PAIN - FUNCTIONAL ASSESSMENT: PAIN_FUNCTIONAL_ASSESSMENT: 0-10

## 2022-08-10 ASSESSMENT — PAIN SCALES - GENERAL: PAINLEVEL_OUTOF10: 0

## 2022-08-10 NOTE — ANESTHESIA PROCEDURE NOTES
Peripheral Block    Patient location during procedure: pre-op  Reason for block: post-op pain management and at surgeon's request  Start time: 8/10/2022 9:11 AM  End time: 8/10/2022 9:22 AM  Staffing  Performed: anesthesiologist   Anesthesiologist: Deion Ross MD  Preanesthetic Checklist  Completed: patient identified, IV checked, site marked, risks and benefits discussed, surgical/procedural consents, equipment checked, pre-op evaluation, timeout performed, anesthesia consent given, oxygen available, monitors applied/VS acknowledged and blood product R/B/A discussed and consented  Peripheral Block   Patient position: sitting  Prep: ChloraPrep  Provider prep: mask and sterile gloves  Block type: Axillary  R axillary  Laterality: right  Injection technique: single-shot  Guidance: ultrasound guided  Local infiltration: lidocaine  Infiltration strength: 1 %  Local infiltration: lidocaine  Dose: 3 mL    Needle   Needle type: insulated echogenic nerve stimulator needle   Needle gauge: 18 G  Needle localization: ultrasound guidance  Needle length: 10 cm  Assessment   Injection assessment: negative aspiration for heme, no paresthesia on injection, local visualized surrounding nerve on ultrasound and no intravascular symptoms  Paresthesia pain: none  Slow fractionated injection: yes  Hemodynamics: stable  Real-time US image taken/store: yes  Outcomes: uncomplicated    Medications Administered  bupivacaine (PF) 0.5 % - Perineural   30 mL - 8/10/2022 9:11:00 AM

## 2022-08-10 NOTE — ANESTHESIA POSTPROCEDURE EVALUATION
Department of Anesthesiology  Postprocedure Note    Patient: Ashli Forrester  MRN: [id number]  YOB: 1960  Date of evaluation: 8/10/2022      Procedure Summary     Date: 08/10/22 Room / Location: [facility] OP OR 07 / [facility]    Anesthesia Start: 1009 Anesthesia Stop: 1103    Procedure: Right thumb trapeziectomy, ligament reconstruction and tendon interposition, Axillary (Right: Hand) Diagnosis:       Arthritis of carpometacarpal (CMC) joint of right thumb      (Arthritis of carpometacarpal (CMC) joint of right thumb [M18.11])    Surgeons: Cruzito Lundberg MD Responsible Provider: Joi Fang MD    Anesthesia Type: MAC ASA Status: 2          Anesthesia Type: MAC    Landon Phase I: Landon Score: 8    Landon Phase II: Landon Score: 10      Anesthesia Post Evaluation    Patient location during evaluation: PACU  Patient participation: complete - patient participated  Level of consciousness: awake and alert  Airway patency: patent  Nausea & Vomiting: no nausea and no vomiting  Complications: no  Cardiovascular status: hemodynamically stable  Respiratory status: acceptable, nonlabored ventilation and spontaneous ventilation  Hydration status: euvolemic  Comments: /77   Pulse 79   Temp 97.8 °F (36.6 °C) (Oral)   Resp 16   Ht 5' 5\" (1.651 m)   Wt 170 lb (77.1 kg)   SpO2 97%   BMI 28.29 kg/m²     Multimodal analgesia pain management approach

## 2022-08-10 NOTE — OP NOTE
ORTHOPAEDIC SURGICAL NOTE        Kane Augustin female 64 y.o.  [id number]   [unfilled]     PRE-OP DIAGNOSIS: Arthritis of carpometacarpal [place]) joint of right thumb [M18.11]   POST-OP DIAGNOSIS: * No post-op diagnosis entered *       PROCEDURES PERFORMED:   Right Thumb Trapeziectomy and Arthroplasty and ligament reconstruction with tendon interposition       SURGEON:   Madai Mina MD     ANESTHESIA: Regional     STAFF:    Circulator: Alison Donaldson RN  Scrub Person First: Sawyer Saver     ESTIMATED BLOOD LOSS: Minimal       TOTAL IV FLUIDS : See anesthesia note    COMPLICATIONS: None     TOURNIQUET TIME:   Total Tourniquet Time Documented:  Arm  (Right) - 28 minutes  Total: Arm  (Right) - 28 minutes       INDICATION FOR PROCEDURE:     Kane Augustin has longstanding Right thumb CMC arthritis recalcitrant to conservative measures including braces, oral and topical NSAIDs and, steroid injections. Surgical and non-surgical treatment options were discussed with the patient and their family, as well as the risk and benefits of each option. After thorough discussion, the patient decided to proceed with surgical management. Specific to this treatment plan, we discussed in detail surgical risks including scar, pain, bleeding, infection, anesthetic risks, neurovascular injury, failure to achieve desired results, hardware problems, need for further surgery,  weakness, stiffness, risk of death and potential risk of other unforseen complication. The Patient consented to the procedure after discussion of the risks and benefits. DESCRIPTION OF PROCEDURE:     The patient was identified in the holding room. The Right thumb was marked and confirmed as the correct operative site. They were then brought to the OR and general anesthesia was induced. They were transferred onto the OR table in the supine position. All bony prominences were well padded.  SCDs were placed on the bilateral legs throughout the case. A timeout was performed, verifying the correct patient, the correct side being the Right side and the correct procedure. Antibiotics were then administered, and were redosed during the procedure as needed at indicated intervals. A non-sterile tourniquet was placed on the Right arm. The Right upper extremity was pre scrubbed and then prepped and draped in routine sterile fashion. An incisional timeout was performed re-confirming the correct patient, surgical site and procedure, as well as verifying antibiotics. A Pinedo incision mas made over the Right 1st CMC joint, the radial sensory nerve was identified and protected, the interval between the APL and Thenar Muscles was identified and used to approach the [facility] joint. The radial artery was identified, articular branches cauterized, the artery was mobilized and protected. The [facility] joint was incised longitudinally, the trapezium was sharply dissected, and was broken up with an osteotome and removed in its entirety with a rongeur. A 3.2mm drill bit was used to create a tunnel in the thumb metacarpal base, taking care to ensure a 1cm bone bridge was present. Then, through a separate incision approximately 10cm proximal to the wrist crease, the FCR tendon sheath was incised and half of the FCR tendon was harvested. An incision was made directly over the FCR tendon at the level of the distal wrist crease to facilitate its delivery to the trapeziectomy space. The FCR tendon was then passed through the tunnel in the thumb metacarpal and then around the intact portion of the tendon while holding the thumb in appropriate tension, and a 2-0 vicryl use used to secure it. The tendon was then passed around itsself several more times, and each pass was secured with a 2-0 vicryl suture. This effected a ligament reconstruction and tendon interposition. Following this, the thumb metacarpal had excellent stability to axial loading.           Wound was irrigated, tourniquet released and hemostasis was obtained with bipolar cautery. The wound was closed in layers with 4-0 vicryl, 4-0 nylon and thumb splint applied. Disposition: To PACU with no complications and follow up per routine. Patient is instructed to keep splint on and avoid lifting with the left hand.        Michael Meyer MD    08/10/22  11:01 AM

## 2022-08-10 NOTE — ANESTHESIA PRE PROCEDURE
Department of Anesthesiology  Preprocedure Note       Name:  Katherine Nolan   Age:  64 y.o.  :  1960                                          MRN:  [id number]         Date:  8/10/2022      Surgeon: Khalida Grey):  Alesia Ruby MD    Procedure: Procedure(s):  Right thumb trapeziectomy, ligament reconstruction and tendon interposition, Axillary  Right thumb trapeziectomy, ligament reconstruction and tendon interposition, Axillary    Medications prior to admission:   Prior to Admission medications    Medication Sig Start Date End Date Taking? Authorizing Provider   ALPRAZolam Kinsey Buggy) 0.5 MG tablet Take 0.5 mg by mouth 3 times daily as needed for Sleep. Yes Historical Provider, MD   ascorbic acid (VITAMIN C) 1000 MG tablet Take 1,000 mg by mouth in the morning. Yes Historical Provider, MD   magnesium (MAGNESIUM-OXIDE) 250 MG TABS tablet Take 250 mg by mouth in the morning.    Yes Historical Provider, MD   COLLAGEN-VITAMIN C-BIOTIN PO Take by mouth daily   Yes Historical Provider, MD   Multiple Vitamins-Minerals (HAIR SKIN NAILS PO) Take by mouth daily   Yes Historical Provider, MD   Cholecalciferol (VITAMIN D3) 1.25 MG (74677 UT) CAPS Take by mouth daily   Yes Historical Provider, MD   anastrozole (ARIMIDEX) 1 MG tablet Take 1 tablet by mouth daily 22   Nichole Aparicio MD   albuterol sulfate  (90 Base) MCG/ACT inhaler Inhale into the lungs 20   Ar Automatic Reconciliation   albuterol (PROVENTIL) (2.5 MG/3ML) 0.083% nebulizer solution Inhale 2.5 mg into the lungs every 4 hours as needed 20   Ar Automatic Reconciliation   aspirin 81 MG EC tablet Take 81 mg by mouth daily 21  Ar Automatic Reconciliation   atorvastatin (LIPITOR) 20 MG tablet Take 20 mg by mouth daily 21  Ar Automatic Reconciliation   DULoxetine (CYMBALTA) 60 MG extended release capsule Take 60 mg by mouth 2 times daily    Ar Automatic Reconciliation   lacosamide (VIMPAT) 200 MG tablet Take 200 mg by mouth 2 times daily. Ar Automatic Reconciliation   Naproxen Sodium 220 MG CAPS Take by mouth as needed    Ar Automatic Reconciliation   traZODone (DESYREL) 100 MG tablet Take 100 mg by mouth nightly    Ar Automatic Reconciliation   zonisamide (ZONEGRAN) 100 MG capsule Take 300 mg by mouth nightly    Ar Automatic Reconciliation       Current medications:    Current Facility-Administered Medications   Medication Dose Route Frequency Provider Last Rate Last Admin    lidocaine 1 % injection 1 mL  1 mL IntraDERmal Once PRN More Richter MD        acetaminophen (TYLENOL) tablet 1,000 mg  1,000 mg Oral Once More Richter MD        famotidine (PEPCID) 20 mg in sodium chloride (PF) 10 mL injection  20 mg IntraVENous Once PRN More Richter MD        midazolam PF (VERSED) injection 2 mg  2 mg IntraVENous Once PRN More Richter MD        ceFAZolin (ANCEF) 2000 mg in sterile water 20 mL IV syringe  2,000 mg IntraVENous On Call to Flaquita Mancilla MD        sodium chloride flush 0.9 % injection 5-40 mL  5-40 mL IntraVENous 2 times per day Carley Daniel MD        sodium chloride flush 0.9 % injection 5-40 mL  5-40 mL IntraVENous PRN Carley Daniel MD        0.9 % sodium chloride infusion   IntraVENous PRN Carley Daniel MD           Allergies:     Allergies   Allergen Reactions    Oxycodone Itching     Pt states not allergic to    Pregabalin Swelling    Sulfa Antibiotics Itching and Nausea Only    Levofloxacin Rash       Problem List:    Patient Active Problem List   Diagnosis Code    Ductal carcinoma in situ (DCIS) of right breast D05.11    Asthma J45.909    Mixed hyperlipidemia E78.2    Insomnia G47.00    Arthritis M19.90    Chronic obstructive pulmonary disease (HCC) J44.9    Precordial pain R07.2    Recurrent major depressive disorder, in full remission ([facility] F33.42    Seizure disorder ([facility] G40.909    Chronic pain G89.29    Memory difficulty R41.3    Myopathy G72.9    Lumbar stenosis with neurogenic claudication M48.062    Pure hypercholesterolemia E78.00    Iron deficiency anemia D50.9    Palpitations R00.2    Anxiety F41.9    DDD (degenerative disc disease), lumbar M51.36    Osteopenia of multiple sites M85.89    Arthritis of carpometacarpal (CMC) joint of right thumb M18.11       Past Medical History:        Diagnosis Date    Anxiety     daily meds    Arthritis     Atypical ductal hyperplasia of right breast 12/12/2016    [facility] cancer ([facility]     Cancer ([facility]     right breast    Carpal tunnel syndrome     right     Chronic obstructive pulmonary disease (HCC)     does have inhalers at this time and nebulizer    Chronic pain     back    Claustrophobia     DDD (degenerative disc disease), lumbar     pt denies    Ductal carcinoma in situ (DCIS) of right breast 1/17/2017    Seeing Dr. Isabela Whitman.  GERD (gastroesophageal reflux disease)     no medication now    History of cigar smoking     History of peptic ulcer     years ago    History of URI (upper respiratory infection)     History of vitamin D deficiency     Insomnia     Lateral epicondylitis  of elbow     Lumbago     Lumbar radiculopathy     Lumbar stenosis with neurogenic claudication 6/11/2015    Memory difficulty     Mixed hyperlipidemia     Myopathy     Neuroma of foot     right     Nicotine vapor product user     Obesity (BMI 30.0-34.9)     33.7    Other hyperalimentation     Papule     Poor historian     Pure hypercholesterolemia     diet controlled     Recurrent major depressive disorder, in full remission ([facility] 04/27/2016    Sees Dr. Jae Ellington. H/o cutting. Therapist Joanna Walton.      Seizure disorder ([facility]     last seizure -last grand mal july-2017 x 5-; managed with medication; followed by Dr Krishan Watts (neuro)    Seizure disorder ([facility]     head trauma    Smoker     40 yr hx  1 1/2 pk per day    Tobacco abuse        Past Surgical History:        Procedure Laterality Date    BACK SURGERY  6/15 latest    x4: 2 ruptured one was sciatic nerve; sees Dr. Mancini Forget  11/2016    dr Anthony Gomez frequency ablation    BACK SURGERY  08/2017    BACK SURGERY  12/2018    BREAST BIOPSY Bilateral     benign    BREAST BIOPSY Right 1/6/2017    RIGHT BREAST NEEDLE LOCALIZED BIOPSY/ ARRIVE @ 1000 TO PREOP/ BREAST CENTER @ 1130 FOR RIGHT MAMMO NEEDLE LOC performed by John Raza MD at [address] LUMPECTOMY Right 1/6/2017    RIGHT BREAST LUMPECTOMY performed by John Raza MD at [address] LUMPECTOMY Right 2/16/2017    EXCISION OF SUPERIOR LUMPECTOMY MARGIN/ RIGHT BREAST performed by Jonh Raza MD at [address] Left     as well as other procedures- bone spur    CARPAL TUNNEL RELEASE Bilateral     COLONOSCOPY  04/2010    COLONOSCOPY  3/13/2019         COLONOSCOPY N/A 3/13/2019    COLONOSCOPY/ 32 performed by Med Hastings MD at [address] [facility] Right     breast     HEENT      cyst and polyps from vocal cords    MARCOS BIOPSY BREAST STEREOTACTIC Right 11/28/2016    ORTHOPEDIC SURGERY  2016    left foot    TUBAL LIGATION         Social History:    Social History     Tobacco Use    Smoking status: Every Day     Packs/day: 0.25     Types: Cigarettes    Smokeless tobacco: Never    Tobacco comments:     Quit smoking: tobacco; recently cut down to 6 per day   Substance Use Topics    Alcohol use:  No                                Ready to quit: Not Answered  Counseling given: Not Answered  Tobacco comments: Quit smoking: tobacco; recently cut down to 6 per day      Vital Signs (Current):   Vitals:    08/08/22 1614   Weight: 170 lb (77.1 kg)   Height: 5' 5\" (1.651 m)                                              BP Readings from Last 3 Encounters:   06/09/22 119/68   05/19/22 126/81   04/06/22 114/78       NPO Status:                                                                                 BMI:   Wt Readings from Last 3 Encounters:   08/08/22 170 lb (77.1 kg)   06/09/22 171 lb 8 oz (77.8 kg)   05/20/22 165 lb (74.8 kg)     Body mass index is 28.29 kg/m². CBC:   Lab Results   Component Value Date/Time    WBC 16.3 06/09/2022 09:38 AM    RBC 4.99 06/09/2022 09:38 AM    HGB 15.5 06/09/2022 09:38 AM    HCT 46.6 06/09/2022 09:38 AM    MCV 93.4 06/09/2022 09:38 AM    RDW 12.6 06/09/2022 09:38 AM     06/09/2022 09:38 AM       CMP:   Lab Results   Component Value Date/Time     06/09/2022 09:38 AM    K 3.9 06/09/2022 09:38 AM     06/09/2022 09:38 AM    CO2 23 06/09/2022 09:38 AM    BUN 14 06/09/2022 09:38 AM    CREATININE 0.80 06/09/2022 09:38 AM    GFRAA >60 06/09/2022 09:38 AM    AGRATIO 1.2 12/03/2021 09:25 AM    LABGLOM >60 06/09/2022 09:38 AM    GLUCOSE 97 06/09/2022 09:38 AM    PROT 7.4 06/09/2022 09:38 AM    CALCIUM 8.8 06/09/2022 09:38 AM    BILITOT 0.5 06/09/2022 09:38 AM    ALKPHOS 119 06/09/2022 09:38 AM    ALKPHOS 125 12/03/2021 09:25 AM    AST 17 06/09/2022 09:38 AM    ALT 25 06/09/2022 09:38 AM       POC Tests: No results for input(s): POCGLU, POCNA, POCK, POCCL, POCBUN, POCHEMO, POCHCT in the last 72 hours.     Coags: No results found for: PROTIME, INR, APTT    HCG (If Applicable): No results found for: PREGTESTUR, PREGSERUM, HCG, HCGQUANT     ABGs: No results found for: PHART, PO2ART, ZLK9KZG, KUA5YYZ, BEART, K7GUKJUB     Type & Screen (If Applicable):  No results found for: LABABO, LABRH    Drug/Infectious Status (If Applicable):  No results found for: HIV, HEPCAB    COVID-19 Screening (If Applicable): No results found for: COVID19        Anesthesia Evaluation  Patient summary reviewed  Airway: Mallampati: II          Dental: normal exam         Pulmonary:Negative Pulmonary ROS breath sounds clear to auscultation  (+) COPD:  asthma: current smoker                           Cardiovascular:  Exercise tolerance: poor (<4 METS),   (+) hyperlipidemia    (-) past MI, murmur and peripheral edema      Rhythm: regular  Rate: normal                 ROS comment: Nuc stress 2018 - Normal  TTE 2018 - unremarkable     Neuro/Psych:   Negative Neuro/Psych ROS  (+) seizures (Last seizure- grand mal 2017):, psychiatric history:depression/anxiety    (-) CVA           GI/Hepatic/Renal: Neg GI/Hepatic/Renal ROS  (+) GERD:,           Endo/Other: Negative Endo/Other ROS   (+) : arthritis:., .                 Abdominal:             Vascular: negative vascular ROS. Other Findings:           Anesthesia Plan      TIVA     ASA 2       Induction: intravenous. Anesthetic plan and risks discussed with patient.               Post-op pain plan if not by surgeon: continuous peripheral nerve block            Roxy Ramirez MD   8/10/2022

## 2022-08-10 NOTE — ANESTHESIA PROCEDURE NOTES
Peripheral Block    Patient location during procedure: pre-op  Reason for block: post-op pain management and at surgeon's request  Start time: 8/10/2022 9:24 AM  End time: 8/10/2022 9:24 AM  Staffing  Performed: anesthesiologist   Anesthesiologist: Sincere Merchant MD  Preanesthetic Checklist  Completed: patient identified, IV checked, site marked, risks and benefits discussed, surgical/procedural consents, equipment checked, pre-op evaluation, timeout performed, anesthesia consent given, oxygen available, monitors applied/VS acknowledged and blood product R/B/A discussed and consented  Peripheral Block   Patient position: sitting  Prep: ChloraPrep  Provider prep: mask and sterile gloves  Block type: Axillary  R axillary  Laterality: right  Injection technique: single-shot  Guidance: ultrasound guided  Local infiltration: lidocaine  Infiltration strength: 1 %  Local infiltration: lidocaine  Dose: 3 mL    Needle   Needle type: insulated echogenic nerve stimulator needle   Needle gauge: 18 G  Needle localization: ultrasound guidance  Needle length: 10 cm  Assessment   Injection assessment: negative aspiration for heme, no paresthesia on injection, local visualized surrounding nerve on ultrasound and no intravascular symptoms  Paresthesia pain: none  Slow fractionated injection: yes  Hemodynamics: stable  Real-time US image taken/store: yes  Outcomes: uncomplicated    Additional Notes  [id number] epi in block  Medications Administered  bupivacaine (PF) 0.5 % - Perineural   30 mL - 8/10/2022 9:24:00 AM

## 2022-08-10 NOTE — DISCHARGE INSTRUCTIONS
Postoperative  Instructions:      Weightbearing or Lifting: You  are  not  allowed  to  lift  any  weight  or  bear  any  weight  on  the  surgical  extremity  until  cleared  by  your  surgeon. Dressing  instructions:    Keep  your  dressing  and/or  splint  clean  and  dry  at  all  times. It  will  be  removed  at  your  first  post-operative  appointment or therapy apppointment. Your  stitches  will  be  removed  at  this  visit. Showering  Instructions:  May  shower  But keep surgical dressing clean and dry until removed as explained above. Pain  Control:  - You  have  been  given  a  prescription  to  be  taken  as  directed  for  post-operative  pain  control. In  addition,  elevate  the  operative  extremity  above  the  heart  at  all  times  to  prevent  swelling  and  throbbing  pain. - If you develop constipation while taking narcotic pain medications (Norco, Hydrocodone, Percocet, Oxycodone, Dilaudid, Hydromorphone) take  over-the-counter  Colace,  100mg  by  mouth  twice  a  Day. - Nausea  is  a  common  side  effect  of  many  pain  medications. You  will  want  to  eat something  before  taking  your  pain  medicine  to  help  prevent  Nausea. - If  you  are  taking  a  prescription  pain  medication  that  contains  acetaminophen,  we  recommend  that  you  do  not  take  additional  over  the  counter  acetaminophen  (Tylenol®). Other  pain  relieving  options:   - Using  a  cold  pack  to  ice  the  affected  area  a  few  times  a  day  (15  to  20  minutes  at  a  time)  can  help  to  relieve  pain,  reduce  swelling  and  bruising.      - Elevation  of  the  affected  area  can  also  help  to  reduce  pain  and  swelling. Did  you  receive  a  nerve  Block? A  nerve  block  can  provide  pain  relief  for  one  hour  to  two  days  after  your  surgery.   As  long  as  the  nerve  block  is  working,  you  will  experience  little  or  no sensation  in  the  area  the  surgeon  operated  on. As  the  nerve  block  wears  off,  you  will  begin  to  experience  pain  or  discomfort. It  is  very  important  that  you  begin  taking  your  prescribed  pain  medication  before  the  nerve  block  fully  wears  off. The first sign that the nerve block is wearing off is tingling in your fingers. Treating  your  pain  at  the  first  sign  of  the  block  wearing  off  will  ensure  your  pain  is  better  controlled  and  more  tolerable  when  full-sensation  returns. Do  not  wait  until  the  pain  is  intolerable,  as  the  medicine  will  be  less  effective. It  is  better  to  treat  pain  in  advance  than  to  try  and  catch  up. General  Anesthesia or Sedation:      If  you  did  not  receive  a  nerve  block  during  your  surgery,  you  will  need  to  start  taking  your  pain  medication  shortly  after  your  surgery  and  should  continue  to  do  so  as  prescribed  by  your  surgeon. Please  call  [phone]  with any concern and ask to speak with Johnie Candelario    Concerning problems include:      -  Excessive  redness  of  the  incisions      -  Drainage  for  more  than  2  Days after surgery or any foul smelling drainage  -  Fever  of  more  than  101.5  F      Please  call  [phone]  if  you  do  not  receive  or  are  unsure  of  your  first  follow-up  appointment. You  should  see  the  doctor  10-14  days  after  your  Surgery. Thank you for choosing me and [address] [place] [address] for your care. I will go above and beyond to ensure you receive the best care possible. Jas Mcwilliams MD      DIET  Clear liquids until no nausea or vomiting; then light diet for the first day. Advance to regular diet on second day, unless your doctor orders otherwise. If nausea and vomiting continues, call your doctor.              After general anesthesia or intravenous sedation, for 24 hours or while taking prescription Narcotics:  Limit your activities  A responsible adult needs to be with you for the next 24 hours  Do not drive and operate hazardous machinery  Do not make important personal or business decisions  Do not drink alcoholic beverages  If you have not urinated within 8 hours after discharge, and you are experiencing discomfort from urinary retention, please go to the nearest ED. If you have sleep apnea and have a CPAP machine, please use it for all naps and sleeping. Please use caution when taking narcotics and any of your home medications that may cause drowsiness. *  Please give a list of your current medications to your Primary Care Provider. *  Please update this list whenever your medications are discontinued, doses are      changed, or new medications (including over-the-counter products) are added. *  Please carry medication information at all times in case of emergency situations.

## 2022-08-22 ENCOUNTER — OFFICE VISIT (OUTPATIENT)
Dept: ORTHOPEDIC SURGERY | Age: 62
End: 2022-08-22

## 2022-08-22 DIAGNOSIS — M18.11 ARTHRITIS OF CARPOMETACARPAL (CMC) JOINT OF RIGHT THUMB: Primary | ICD-10-CM

## 2022-08-22 PROCEDURE — 99024 POSTOP FOLLOW-UP VISIT: CPT | Performed by: ORTHOPAEDIC SURGERY

## 2022-08-22 NOTE — PROGRESS NOTES
Orthopaedic Hand Surgery Note    Name: Miriam Carter  YOB: 1960  Gender: female  MRN: [id number]    HPI: Patient is status post Right thumb trapeziectomy, ligament reconstruction and tendon interposition, Axillary - Right on 8/10/2022. Patient reports mild pain. No numbness, tingling, fevers or chills. Physical Examination:  Wound healing well. Good finger range of motion. Sensation is intact to light touch in all digits. Mild swelling in the operative wrist. Posture of the thumb is excellent. Negative CMC grind for pain or crepitus    Assessment:     ICD-10-CM    1. Arthritis of carpometacarpal [place]) joint of right thumb  M18.11           Status post Right thumb trapeziectomy, ligament reconstruction and tendon interposition, Axillary - Right on 8/10/2022    Plan:  We discussed the treatment and therapy plan. We will place the patient in a cast until 4 weeks postop. Patient will start occupational therapy on the same day we remove the cast We will continue brace after that until 8 weeks post-op. Therapy will focus on motion, edema control, custom bracing and scar management and progress into light strengthening at 6-8 weeks post-op depending on progress. We once again discussed the long recovery and need for protection. Patient will return in 2 weeks for cast removal and re-evaluation.     Elvis Moss MD  Orthopaedic Surgery  08/22/22  10:58 AM

## 2022-09-06 ENCOUNTER — OFFICE VISIT (OUTPATIENT)
Dept: ORTHOPEDIC SURGERY | Age: 62
End: 2022-09-06
Payer: MEDICARE

## 2022-09-06 ENCOUNTER — OFFICE VISIT (OUTPATIENT)
Dept: ORTHOPEDIC SURGERY | Age: 62
End: 2022-09-06

## 2022-09-06 DIAGNOSIS — M65.341 TRIGGER FINGER, RIGHT RING FINGER: ICD-10-CM

## 2022-09-06 DIAGNOSIS — S92.412D DISPLACED FRACTURE OF PROXIMAL PHALANX OF LEFT GREAT TOE, SUBSEQUENT ENCOUNTER FOR FRACTURE WITH ROUTINE HEALING: ICD-10-CM

## 2022-09-06 DIAGNOSIS — G56.03 CARPAL TUNNEL SYNDROME, BILATERAL UPPER LIMBS: ICD-10-CM

## 2022-09-06 DIAGNOSIS — M18.11 ARTHRITIS OF CARPOMETACARPAL (CMC) JOINT OF RIGHT THUMB: Primary | ICD-10-CM

## 2022-09-06 PROCEDURE — 97110 THERAPEUTIC EXERCISES: CPT | Performed by: OCCUPATIONAL THERAPIST

## 2022-09-06 PROCEDURE — 97165 OT EVAL LOW COMPLEX 30 MIN: CPT | Performed by: OCCUPATIONAL THERAPIST

## 2022-09-06 PROCEDURE — 99024 POSTOP FOLLOW-UP VISIT: CPT | Performed by: ORTHOPAEDIC SURGERY

## 2022-09-06 PROCEDURE — L3808 WHFO, RIGID W/O JOINTS: HCPCS | Performed by: OCCUPATIONAL THERAPIST

## 2022-09-06 NOTE — PROGRESS NOTES
Orthopaedic Hand Surgery Note    Name: Vibha Coronado  YOB: 1960  Gender: female  MRN: [id number]    HPI: Patient is status post Right thumb trapeziectomy, ligament reconstruction and tendon interposition, Axillary - Right on 8/10/2022. Patient reports mild pain. No numbness, tingling, fevers or chills. Physical Examination:   Wounds healed well. Good finger range of motion. Sensation is intact to light touch in all digits. Mild swelling in the operative wrist. Posture of the thumb is excellent. Negative CMC grind for pain  or crepitus    Assessment:     ICD-10-CM    1. Arthritis of carpometacarpal [place]) joint of right thumb  M18.11           Status post Right thumb trapeziectomy, ligament reconstruction and tendon interposition, Axillary - Right on 8/10/2022    Plan:   We discussed the treatment and therapy plan. Cast was removed today. She will begin Hand Therapy today, and will have a custom molded brace constructed. We will continue brace until 8 weeks post-op. Therapy will focus on motion, edema control, custom bracing and scar management and progress into light strengthening at 6-8 weeks post-op depending on progress. We once again discussed  the long recovery and need for protection. Patient will return in 4 weeks re-evaluation.     Vaishnavi Lyons MD  Orthopaedic Surgery  09/06/22  1:38 PM

## 2022-09-06 NOTE — PROGRESS NOTES
[address] [facility] ORTHOPEDICS - [address] [address]  Dept: [phone]      Occupational Therapy Initial Assessment     Referring MD: Keysha Akhtar MD    Diagnosis:     ICD-10-CM    1. Arthritis of carpometacarpal (CMC) joint of right thumb  M18.11       2. Displaced fracture of proximal phalanx of left great toe, subsequent encounter for fracture with routine healing  S92.412D       3. Carpal tunnel syndrome, bilateral upper limbs  G56.03       4. Trigger finger, right ring finger  M65.341            Surgery:  Right thumb trapeziectomy, ligament reconstruction and tendon interposition,  Date 8/10/22     Therapy precautions: Joint arthroplasty  Avoid lateral pinch  Avoid thumb adduction  Avoid wide radial abduction  No strengthening until post-op week 6  Facilitate neuromuscular re-education/maintaining \"C\" shape    Total Direct Treatment Time: 15 min                       Total In Office Time: 45 min    Preferred Name:  Lane Tran HISTORY     PMHX & Meds:   Past Medical History:   Diagnosis Date    Anxiety     daily meds    Arthritis     Atypical ductal hyperplasia of right breast 12/12/2016    Breast cancer ([facility]     Cancer ([facility]     right breast    Carpal tunnel syndrome     right     Chronic obstructive pulmonary disease (HCC)     does have inhalers at this time and nebulizer    Chronic pain     back    Claustrophobia     DDD (degenerative disc disease), lumbar     pt denies    Ductal carcinoma in situ (DCIS) of right breast 1/17/2017    Seeing Dr. Óscar Pineda.     GERD (gastroesophageal reflux disease)     no medication now    History of cigar smoking     History of peptic ulcer     years ago    History of URI (upper respiratory infection)     History of vitamin D deficiency     Insomnia     Lateral epicondylitis  of elbow     Lumbago     Lumbar radiculopathy     Lumbar stenosis with neurogenic claudication 6/11/2015    Memory difficulty     Mixed hyperlipidemia     Myopathy     Neuroma of foot     right     Nicotine vapor product user     Obesity (BMI 30.0-34.9)     33.7    Other hyperalimentation     Papule     Poor historian     Pure hypercholesterolemia     diet controlled     Recurrent major depressive disorder, in full remission ([facility] 04/27/2016    Sees Dr. Ritika Bingham. H/o cutting. Therapist Sylwia Banks.      Seizure disorder ([facility]     last seizure -last grand mal july-2017 x 5-; managed with medication; followed by Dr Iveth Butler (neuro)    [facility] ([facility]     head trauma    Smoker     40 yr hx  1 1/2 pk per day    Tobacco abuse    ,   Past Surgical History:   Procedure Laterality Date    ARTHROPLASTY Right 8/10/2022    Right thumb trapeziectomy, ligament reconstruction and tendon interposition, Axillary performed by Senthil Gardner MD at [address]  6/15 latest    x4: 2 ruptured one was sciatic nerve; sees Dr. Andrez Prince  11/2016    dr Briseida George frequency ablation    BACK SURGERY  08/2017    BACK SURGERY  12/2018    BREAST BIOPSY Bilateral     benign    BREAST BIOPSY Right 1/6/2017    RIGHT BREAST NEEDLE LOCALIZED BIOPSY/ ARRIVE @ 1000 TO PREOP/ BREAST CENTER @ 1130 FOR RIGHT MAMMO NEEDLE LOC performed by Rosetta Mathew MD at [address] [facility] LUMPECTOMY Right 1/6/2017    RIGHT BREAST LUMPECTOMY performed by Rosetta Mathew MD at [address] [facility] LUMPECTOMY Right 2/16/2017    EXCISION OF SUPERIOR LUMPECTOMY MARGIN/ RIGHT BREAST performed by Rosetta Mathew MD at [address] [place] [address] Left     as well as other procedures- bone spur    CARPAL TUNNEL RELEASE Bilateral     COLONOSCOPY  04/2010    COLONOSCOPY  3/13/2019         COLONOSCOPY N/A 3/13/2019    COLONOSCOPY/ 32 performed by Purnima Solorzano MD at [facility] Right     breast     HEENT      cyst and polyps from vocal cords    MARCOS BIOPSY BREAST STEREOTACTIC Right 11/28/2016    ORTHOPEDIC SURGERY  2016    left foot    TUBAL LIGATION        Medications. : Reviewed in chart  Allergies: Allergies   Allergen Reactions    Oxycodone Itching     Pt states not allergic to    Pregabalin Swelling    Sulfa Antibiotics Itching and Nausea Only    Levofloxacin Rash        SUBJECTIVE       Current Symptoms/Chief complaints: No chief complaint on file. Chief complaint/history of injury:   Date symptoms began: chronic, progressive onset  Mariam Searing of condition:Chronic (continuous duration > 3 months)  Primary cause of current episode: Repetitive; pt does a lot of crafting with her hands   How did symptoms start: Pt has had chronic CMC arthritis for several years   Describe current symptoms: aching and throbbing thenar muscle     Received previous outpatient therapy? No      Pain Assessment:  Pain location: base of right thumb, \"feel like a nail is going through the palm of my hand. \"   Average Pain/symptom intensity (0-10 scale)  Last 24 hours: 5/10  Last week (1-7 days): _4-5/10  How often do you feel symptoms? Occasionally (26-50%)  Description: aching and throbbing  Aggravating factors:  positioning, movement   Alleviating factors:  rest, meds     Social/Functional Hx:  Pt lives lives with their family   Current DME: brace/splintcustom thumb spica brace fabricated today   Work Status: Retired; has had 6 back surgeries, can no longer work   Sleep: minimally disturbed; takes sleeping meds to help; at first had difficulty sleeping but is improving   PLOF & Social Hx/Interests:  Independent and active without physical limitations and participated in drawing, painting, yardwork, crafting, sewing   Current level of function: requires min assist with opening jars, holding objects and writing (modified )     Neuro screen: Pt denies c/o, numbness, and tingling    Patient Stated Goals: \"I want to get back to crafting\"    OBJECTIVE     Functional Outcome Measures: Quick Dash  37 score=   59 % functional deficit  Hand/Side Dominance: right pressure areas and allergic reaction due to orthosis, Patient to verbalize understanding of precautions and necessity of wearing orthosis as indicated by therapist, Patient to verbalize understanding of wound/pin care in one visit, and Patient to demonstrate independence with HEP in one visit    ALL TREATMENT GOALS MET AT TIME OF EVALUATION:   No: Will review next visit to assure independence    PLAN:   Follow-up weekly for formal therapy     Initial Evaluation: Low Complexity (78123)      Therapeutic exercise (82540) x 15 min:  Home Exercise Program development and Education: see below. Patient I with program following instruction and performance  Use of heat and ice as needed for pain and inflammation reduction. Precautions reviewed. OT POC and rationale, education for surgical precautions and pain management, edema management      CLINICAL DECISION MAKING/ASSESSMENT     Performance Deficits  Physical: Dexterity, Mobility, Strength, Fine motor coordination, and Sensation  Cognitive: No deficiencies noted  Psychosocial: No deficiencies noted  Rehab potential: excellent    The patient presents today s/p Right thumb trapeziectomy, ligament reconstruction and tendon interposition . The patient presents with stiffness MP/IP joint of thumb, mild swelling at base of thumb, mild scar tissue adherence, discomfort with AROM and decreased prehensile and FM coordination right hand . Based on these subjective and objective findings, the patient is perceived as currently having a primary functional deficit of  59% dysfunction. After a brief chart/PMH and OT profile review, evaluation requiring minimal  assistance/modifications and simple patient and data analysis, I have determined the patient exhibits several (1-3) performance deficits. Comorbidities do not affect the patient's occupational performance.  The following performance deficits (including but not limited to physical, cognitive and/or psychosocial components) result in activity limitations and participation restrictions: Dexterity, Mobility, Strength, Fine motor coordination, and Sensation    The patient would benefit from skilled occupational therapy services to address the deficits noted above for return to prior level of function. PLAN OF CARE     Effective Dates: 9/6/2022 TO 11/5/2022 (60 days).     Frequency/Duration:  1-2x/week  for 60 Day(s)  Interventions may include but are not limited to: (66740) Therapeutic exercise to develop strength and endurance, range of motion, and flexibility, (38943) Manual Therapy:   Consisting of but not limited to hands on treatment by therapist for connective tissue massage, pain management, edema management, joint mobilization and manipulation, manual traction, passive range of motion, soft tissue and neural system mobilization/manipulation and therapeutic massage., (67776 Initial orthotic management and training: Fabrication/adjustments as indicated, (82054) Subsequent orthotic management as indicated, Modalities prn to address pain, spasms, and swelling: (40236) Hot/cold pack  (13166) Fluidotherapy  (32150) Paraffin, Home exercise program (HEP) development, (08875) Neuromuscular Re-education: to improve balance, coordination, kinesthetic sense, posture and proprioception (e.g., proprioceptive and neuromuscular facilitation, desensitization techniques), (80717) Kineseotaping for Neuromuscular Re-education : Muscle inhibition or facilitation, space correction, to improve proprioception,; for endurance or correction of postural stabilizers; addressing trophic changes of complex regional pain syndrome, (46786) Kineseotaping for Manual Therapy Techniques for soft tissue mobilization, facilitation of muscles, pain management, lymphatic management, swelling management, scar mobilization, myofascial correction, mechanical joint correction or support, and (14780) Kineseotaping for Therapeutic Procedure/Exercise  for strengthening or lengthening a muscle. Used in conjunction with retraining posture, endurance and flexibility      GOALS     Short term goals:  10/4/2022  (4 weeks)  Patient will be I with HEP. Patient will be independent with making a \"C\" with the thumb and IF to facilitate neuromuscular re-education. Patient will be independent with use of orthosis and post-operative precautions. Increase AROM of affected thumb IP flexion from 45 ° to at least 55 ° to improve function with light ADL's. Increase AROM of affected  thumb MP from 22 ° to at least 35 ° to improve ability to lightly pinch with ADL's like writing. Patient will be able to oppose affected thumb to MF to improve ability to grasp and prehension. Increase AROM affected wrist extension/flexion to [facility] to improve function with dressing and bathing. Pt will be able to make a full composite fist with the affected hand so that the pt is able to grasp and hold objects during self care. Improve Quick DASH functional assessment score from 59% deficit to less than 40% deficit. Long term goals:  11/1/2022  (8 weeks)  Patient will have full thumb opposition for all ADL's/IADL's without difficulty. The patient will have at least 60° IP flexion and 50° MP flexion of affected thumb in order to perform activities that involve grasp. Patient will have at least 30 psi of  strength affected hand to improve ability to grasp and hold an object. Patient will have at least 10 psi of 3 jaw jeny pinch strength affected hand to improve ability to pinch during ADL's like zippers, opening bags and turning a key. Patient will have minimal edema in affected hand/thumb. Pts Quick DASH functional assessment score will be less than 20% functional deficit. Your Office Agent Portal   Access Code: [email or web address]  URL: [email or web address]  Date: 09/06/2022  Prepared by:  Devin Fabian    Exercises  Thumb AROM IP Blocking - 5 x daily - 7 x weekly - 2 sets - 15 reps  Seated Composite Thumb Flexion AROM - 5 x daily - 7 x weekly - 2 sets - 15 reps  Thumb AROM MP Blocking - 5 x daily - 7 x weekly - 2 sets - 15 reps  Thumb AROM Opposition - 5 x daily - 7 x weekly - 2 sets - 15 reps  Wrist Flexion Extension AROM with Fingers Curled and Palm Down - 5 x daily - 7 x weekly - 2 sets - 15 reps  Seated Wrist Radial and Ulnar Deviation AROM - 5 x daily - 7 x weekly - 2 sets - 15 reps  Wrist Circumduction AROM - 5 x daily - 7 x weekly - 2 sets - 15 reps      OT Protocols

## 2022-09-12 NOTE — PROGRESS NOTES
[facility] [facility] ORTHOPEDICS - INTERNATIONAL  [address] [address]  Dept: [phone]      Occupational Therapy Daily Note      Referring MD: Ary Smith MD    Diagnosis:     ICD-10-CM    1. Arthritis of carpometacarpal (CMC) joint of right thumb  M18.11       2. Displaced fracture of proximal phalanx of left great toe, subsequent encounter for fracture with routine healing  S92.412D       3. Carpal tunnel syndrome, bilateral upper limbs  G56.03       4. Trigger finger, right ring finger  M65.341            Surgery:  Right thumb trapeziectomy, ligament reconstruction and tendon interposition,  Date 8/10/22     Therapy precautions: Joint arthroplasty  Avoid lateral pinch  Avoid thumb adduction  Avoid wide radial abduction  No strengthening until post-op week 6  Facilitate neuromuscular re-education/maintaining \"C\" shape    Total Direct Treatment Time: 45 min                       Total In Office Time: 50 min    Preferred Name:  Nano Manzo HISTORY     PMHX & Meds:   Past Medical History:   Diagnosis Date    Anxiety     daily meds    Arthritis     Atypical ductal hyperplasia of right breast 12/12/2016    Breast cancer ([facility] [address]     Cancer ([facility]     right breast    Carpal tunnel syndrome     right     Chronic obstructive pulmonary disease (HCC)     does have inhalers at this time and nebulizer    Chronic pain     back    Claustrophobia     DDD (degenerative disc disease), lumbar     pt denies    Ductal carcinoma in situ (DCIS) of right breast 1/17/2017    Seeing Dr. Byron Tomas.     GERD (gastroesophageal reflux disease)     no medication now    History of cigar smoking     History of peptic ulcer     years ago    History of URI (upper respiratory infection)     History of vitamin D deficiency     Insomnia     Lateral epicondylitis  of elbow     Lumbago     Lumbar radiculopathy     Lumbar stenosis with neurogenic claudication 6/11/2015    Memory difficulty     Mixed hyperlipidemia     Myopathy     Neuroma of foot     right     Nicotine vapor product user     Obesity (BMI 30.0-34.9)     33.7    Other hyperalimentation     Papule     Poor historian     Pure hypercholesterolemia     diet controlled     Recurrent major depressive disorder, in full remission ([facility] 04/27/2016    Sees Dr. Olamide Aguero. H/o cutting. Therapist Toya Lyman.      [facility]     last seizure -last grand mal july-2017 x 5-; managed with medication; followed by Dr Alba Groves (neuro)    [facility] ([facility]     head trauma    Smoker     40 yr hx  1 1/2 pk per day    Tobacco abuse    ,   Past Surgical History:   Procedure Laterality Date    ARTHROPLASTY Right 8/10/2022    Right thumb trapeziectomy, ligament reconstruction and tendon interposition, Axillary performed by Cruzito Lundberg MD at [address]  6/15 latest    x4: 2 ruptured one was sciatic nerve; sees Dr. Ortiz Gilmore  11/2016    dr Emily Amos frequency ablation    BACK SURGERY  08/2017    BACK SURGERY  12/2018    BREAST BIOPSY Bilateral     benign    BREAST BIOPSY Right 1/6/2017    RIGHT BREAST NEEDLE LOCALIZED BIOPSY/ ARRIVE @ 1000 TO PREOP/ BREAST CENTER @ 1130 FOR RIGHT MAMMO NEEDLE LOC performed by Irena Acosta MD at [address] [facility] LUMPECTOMY Right 1/6/2017    RIGHT BREAST LUMPECTOMY performed by Irena Acosta MD at [address] [facility] LUMPECTOMY Right 2/16/2017    EXCISION OF SUPERIOR LUMPECTOMY MARGIN/ RIGHT BREAST performed by Irena Acosta MD at [address] [place] [address] Left     as well as other procedures- bone spur    CARPAL TUNNEL RELEASE Bilateral     COLONOSCOPY  04/2010    COLONOSCOPY  3/13/2019         COLONOSCOPY N/A 3/13/2019    COLONOSCOPY/ 32 performed by Lino Kinney MD at [facility] Right     breast     HEENT      cyst and polyps from vocal cords    MARCOS BIOPSY BREAST STEREOTACTIC Right 11/28/2016    ORTHOPEDIC SURGERY  2016    left foot    TUBAL LIGATION        Medications. : Reviewed in chart  Allergies: Allergies   Allergen Reactions    Oxycodone Itching     Pt states not allergic to    Pregabalin Swelling    Sulfa Antibiotics Itching and Nausea Only    Levofloxacin Rash        SUBJECTIVE     Pt reports her movement is coming along well. She has been doing all her exercises daily. One spot on splint that has been irritating the scar. OBJECTIVE       A/PROM MEASUREMENTS    ROM of uninvolved joints:  Elbow screened- WNL    Thumb AROM: RIGHT LEFT     MP ext/flex 22°  75°      IP ext/flex 45°  87°      Radial add/abduction 50°  60°      Palmar add/abduction 40°  55°      Opposition (Kapandji) 4 10         Wrist AROM: RIGHT LEFT     Wrist Extension/Flexion 45/40°  NT°      RD/UD 13/20°  NT°          Treatment:     Therapeutic exercise (79519) x 45 min:  Fluidotherapy (21458): Therapist guided AROM in fluidotherapy for heat/ROM prior to exercise and manual intervention to increase available ROM and joint mobility  Manual STM to scar area  Translation palm to finger tip small marbles, hold x4 at a time   Review of HEP   Numbered ball roll in palm x3 (1-10)   CMC extension with IP and MP flexed  \"C\" isometric   Wrist maze x3 minutes  Orthosis adjusted surrounding scar area to decrease rubbing      ASSESSMENT     Pt has progressed very well since initial assessment and orthosis fabrication. Her thumb range of motion progressing. PLAN      Thumb and wrist AROM  Scar and edema management     GOALS     Short term goals:  10/4/2022  (4 weeks)  Patient will be I with HEP. Patient will be independent with making a \"C\" with the thumb and IF to facilitate neuromuscular re-education. Patient will be independent with use of orthosis and post-operative precautions. Increase AROM of affected thumb IP flexion from 45 ° to at least 55 ° to improve function with light ADL's.   Increase AROM of affected  thumb MP from 22 ° to at least 35 ° to improve ability to lightly pinch with ADL's like writing. Patient will be able to oppose affected thumb to MF to improve ability to grasp and prehension. Increase AROM affected wrist extension/flexion to [facility] to improve function with dressing and bathing. Pt will be able to make a full composite fist with the affected hand so that the pt is able to grasp and hold objects during self care. Improve Quick DASH functional assessment score from 59% deficit to less than 40% deficit. Long term goals:  11/1/2022  (8 weeks)  Patient will have full thumb opposition for all ADL's/IADL's without difficulty. The patient will have at least 60° IP flexion and 50° MP flexion of affected thumb in order to perform activities that involve grasp. Patient will have at least 30 psi of  strength affected hand to improve ability to grasp and hold an object. Patient will have at least 10 psi of 3 jaw jeny pinch strength affected hand to improve ability to pinch during ADL's like zippers, opening bags and turning a key. Patient will have minimal edema in affected hand/thumb. Pts Quick DASH functional assessment score will be less than 20% functional deficit. [employer] Portal   Access Code: [email or web address]  URL: [email or web address]  Date: 09/06/2022  Prepared by:  Rukhsana Garcia    Exercises  Thumb AROM IP Blocking - 5 x daily - 7 x weekly - 2 sets - 15 reps  Seated Composite Thumb Flexion AROM - 5 x daily - 7 x weekly - 2 sets - 15 reps  Thumb AROM MP Blocking - 5 x daily - 7 x weekly - 2 sets - 15 reps  Thumb AROM Opposition - 5 x daily - 7 x weekly - 2 sets - 15 reps  Wrist Flexion Extension AROM with Fingers Curled and Palm Down - 5 x daily - 7 x weekly - 2 sets - 15 reps  Seated Wrist Radial and Ulnar Deviation AROM - 5 x daily - 7 x weekly - 2 sets - 15 reps  Wrist Circumduction AROM - 5 x daily - 7 x weekly - 2 sets - 15 reps      OT Protocols

## 2022-09-13 ENCOUNTER — OFFICE VISIT (OUTPATIENT)
Dept: ORTHOPEDIC SURGERY | Age: 62
End: 2022-09-13
Payer: MEDICARE

## 2022-09-13 DIAGNOSIS — M18.11 ARTHRITIS OF CARPOMETACARPAL (CMC) JOINT OF RIGHT THUMB: Primary | ICD-10-CM

## 2022-09-13 DIAGNOSIS — M65.341 TRIGGER FINGER, RIGHT RING FINGER: ICD-10-CM

## 2022-09-13 DIAGNOSIS — S92.412D DISPLACED FRACTURE OF PROXIMAL PHALANX OF LEFT GREAT TOE, SUBSEQUENT ENCOUNTER FOR FRACTURE WITH ROUTINE HEALING: ICD-10-CM

## 2022-09-13 DIAGNOSIS — G56.03 CARPAL TUNNEL SYNDROME, BILATERAL UPPER LIMBS: ICD-10-CM

## 2022-09-13 PROCEDURE — 97022 WHIRLPOOL THERAPY: CPT | Performed by: OCCUPATIONAL THERAPIST

## 2022-09-13 PROCEDURE — 97110 THERAPEUTIC EXERCISES: CPT | Performed by: OCCUPATIONAL THERAPIST

## 2022-09-13 NOTE — TELEPHONE ENCOUNTER
I spoke with patient, advised her  that her surgery is scheduled for 8/10/22 at the [facility] outpatient surgery center located at [facility]. Katherine Araujo.  she will have a phone pre-assessment where they will call her a few days before her surgery to go over various health questions. The OR will call her the business day before  her surgery (usually around 2:00 p.m.) to let her know what time she  needs to arrive the day of surgery. I advised her that her post op appointment with Dr. Sixto Rodriguez is scheduled on 8/22/22 at 10:20 a.m.  at our [facility] office. Patient voiced understanding. Medical screening examination initiated.  I have conducted a focused provider triage encounter, findings are as follows:    Brief history of present illness:  Left index finger injury from table saw all, decreased range of motion, no pulsatile bleeding noted    Vitals:    09/13/22 1513   BP: 119/74   BP Location: Right arm   Patient Position: Sitting   Pulse: 76   Resp: 18   Temp: 98.1 °F (36.7 °C)   TempSrc: Oral   SpO2: 100%   Weight: 91.8 kg (202 lb 7.9 oz)       Pertinent physical exam:  Laceration to the dorsum of the left 2nd digit extending from the MCP to the PIP, unable to extend finger    Brief workup plan:  X-ray and further evaluation    Preliminary workup initiated; this workup will be continued and followed by the physician or advanced practice provider that is assigned to the patient when roomed.

## 2022-09-19 NOTE — PROGRESS NOTES
[facility] [facility] ORTHOPEDICS - INTERNATIONAL  [facility] [address]  Dept: [phone]      Occupational Therapy Daily Note      Referring MD: Omar Ramirez MD    Diagnosis:     ICD-10-CM    1. Arthritis of carpometacarpal (CMC) joint of right thumb  M18.11       2. Bilateral primary osteoarthritis of first carpometacarpal joints  M18.0            Surgery:  Right thumb trapeziectomy, ligament reconstruction and tendon interposition,  Date 8/10/22     Therapy precautions: Joint arthroplasty  Avoid lateral pinch  Avoid thumb adduction  Avoid wide radial abduction  No strengthening until post-op week 6  Facilitate neuromuscular re-education/maintaining \"C\" shape    Total Direct Treatment Time: 45 min                       Total In Office Time: 50 min    Preferred Name:  Audrey Posada HISTORY     PMHX & Meds:   Past Medical History:   Diagnosis Date    Anxiety     daily meds    Arthritis     Atypical ductal hyperplasia of right breast 12/12/2016    Breast cancer ([facility] [address]     Cancer ([address]     right breast    Carpal tunnel syndrome     right     Chronic obstructive pulmonary disease (HCC)     does have inhalers at this time and nebulizer    Chronic pain     back    Claustrophobia     DDD (degenerative disc disease), lumbar     pt denies    Ductal carcinoma in situ (DCIS) of right breast 1/17/2017    Seeing Dr. Mario Thayer.     GERD (gastroesophageal reflux disease)     no medication now    History of cigar smoking     History of peptic ulcer     years ago    History of URI (upper respiratory infection)     History of vitamin D deficiency     Insomnia     Lateral epicondylitis  of elbow     Lumbago     Lumbar radiculopathy     Lumbar stenosis with neurogenic claudication 6/11/2015    Memory difficulty     Mixed hyperlipidemia     Myopathy     Neuroma of foot     right     Nicotine vapor product user     Obesity (BMI 30.0-34.9)     33.7    Other hyperalimentation     Papule     Poor historian     Pure hypercholesterolemia     diet controlled     Recurrent major depressive disorder, in full remission ([facility] [address] 04/27/2016    Sees Dr. Medhat Kang. H/o cutting. Therapist Verito Pritchard. Seizure disorder ([facility] [address]     last seizure -last grand mal july-2017 x 5-; managed with medication; followed by Dr Jose Eduardo Gonzalez (neuro)    Seizure disorder ([facility] [address]     head trauma    Smoker     40 yr hx  1 1/2 pk per day    Tobacco abuse    ,   Past Surgical History:   Procedure Laterality Date    ARTHROPLASTY Right 8/10/2022    Right thumb trapeziectomy, ligament reconstruction and tendon interposition, Axillary performed by Kelley Mcneil MD at [address] [facility] [address]  6/15 latest    x4: 2 ruptured one was sciatic nerve; sees Dr. Tyrone Pina  11/2016    dr Reny Dugan frequency ablation    BACK SURGERY  08/2017    BACK SURGERY  12/2018    BREAST BIOPSY Bilateral     benign    BREAST BIOPSY Right 1/6/2017    RIGHT BREAST NEEDLE LOCALIZED BIOPSY/ ARRIVE @ 1000 TO PREOP/ BREAST CENTER @ 1130 FOR RIGHT MAMMO NEEDLE LOC performed by Kye Chapman MD at [address] [facility] LUMPECTOMY Right 1/6/2017    RIGHT BREAST LUMPECTOMY performed by Kye Chapman MD at [address] [facility] LUMPECTOMY Right 2/16/2017    EXCISION OF SUPERIOR LUMPECTOMY MARGIN/ RIGHT BREAST performed by Kye Chapman MD at [address] [place] [address] Left     as well as other procedures- bone spur    CARPAL TUNNEL RELEASE Bilateral     COLONOSCOPY  04/2010    COLONOSCOPY  3/13/2019         COLONOSCOPY N/A 3/13/2019    COLONOSCOPY/ 32 performed by Loki Khan MD at [address] [place] [address] Right     breast     HEENT      cyst and polyps from vocal cords    MARCOS BIOPSY BREAST STEREOTACTIC Right 11/28/2016    ORTHOPEDIC SURGERY  2016    left foot    TUBAL LIGATION        Medications. : Reviewed in chart  Allergies:    Allergies   Allergen Reactions    Oxycodone Itching     Pt states not allergic to    Pregabalin Swelling Sulfa Antibiotics Itching and Nausea Only    Levofloxacin Rash        SUBJECTIVE     Pt is almost 6 weeks post-op. Pt reports that she is very sore over the scar area \"no matter if the splint is on or off. \"     OBJECTIVE       A/PROM MEASUREMENTS    ROM of uninvolved joints:  Elbow screened- WNL    Thumb AROM: RIGHT LEFT RIGHT  9/20/22    MP ext/flex 22°  75°  40    IP ext/flex 45°  87°  70    Radial add/abduction 50°  60°      Palmar add/abduction 40°  55°      Opposition (Kapandji) 4 10 8        Wrist AROM: RIGHT LEFT     Wrist Extension/Flexion 45/40°  NT°      RD/UD 13/20°  NT°          Treatment:     Therapeutic exercise (56714) x 45 min:  Fluidotherapy (78538): Therapist guided AROM in fluidotherapy for heat/ROM prior to exercise and manual intervention to increase available ROM and joint mobility  Manual STM to scar area and use of mini vibration wand to decrease sensitivity;  Translation palm to finger tip small foam blocks, hold x4 at a time   Review of HEP   Numbered ball roll in palm x2 (1-10)   Use of tweezers to  small beads x20   Placement of x15 pegs in large pegboard while maintaing \"C\" position of thumb   IF abduction to strength first dorsal interossei   Orthosis adjusted surrounding scar area to decrease rubbing      ASSESSMENT     Pt had less pain after today's visit. We discussed working on scar management outside of therapy as well as continued precautions with lifting and pulling. Range of motion in right thumb improving greatly. PLAN      Thumb and wrist AROM  Scar and edema management   Provide light wrist and thumb strengthening exercises next visit 9/27     GOALS     Short term goals:  10/4/2022  (4 weeks)  Patient will be I with HEP. Patient will be independent with making a \"C\" with the thumb and IF to facilitate neuromuscular re-education. Patient will be independent with use of orthosis and post-operative precautions.   Increase AROM of affected thumb IP flexion from 45 ° daily - 7 x weekly - 2 sets - 15 reps      OT Protocols

## 2022-09-20 ENCOUNTER — OFFICE VISIT (OUTPATIENT)
Dept: ORTHOPEDIC SURGERY | Age: 62
End: 2022-09-20
Payer: MEDICARE

## 2022-09-20 DIAGNOSIS — M18.0 BILATERAL PRIMARY OSTEOARTHRITIS OF FIRST CARPOMETACARPAL JOINTS: ICD-10-CM

## 2022-09-20 DIAGNOSIS — M18.11 ARTHRITIS OF CARPOMETACARPAL (CMC) JOINT OF RIGHT THUMB: Primary | ICD-10-CM

## 2022-09-20 PROCEDURE — 97110 THERAPEUTIC EXERCISES: CPT | Performed by: OCCUPATIONAL THERAPIST

## 2022-09-20 PROCEDURE — 97022 WHIRLPOOL THERAPY: CPT | Performed by: OCCUPATIONAL THERAPIST

## 2022-09-26 NOTE — PROGRESS NOTES
[address] [facility] ORTHOPEDICS - [address] [address]  Dept: [phone]      Occupational Therapy Daily Note      Referring MD: Yariel Rolon MD    Diagnosis:     ICD-10-CM    1. Arthritis of carpometacarpal (CMC) joint of right thumb  M18.11       2. Bilateral primary osteoarthritis of first carpometacarpal joints  M18.0       3. Displaced fracture of proximal phalanx of left great toe, subsequent encounter for fracture with routine healing  S92.412D       4. Trigger finger, right ring finger  M65.341       5. Unspecified injury of left foot, initial encounter  S99.922A       6. Carpal tunnel syndrome, bilateral upper limbs  G56.03       7. Displaced fracture of proximal phalanx of left great toe, initial encounter for closed fracture  S92.412A            Surgery:  Right thumb trapeziectomy, ligament reconstruction and tendon interposition,  Date 8/10/22     Therapy precautions: Joint arthroplasty  Avoid lateral pinch  Avoid thumb adduction  Avoid wide radial abduction  No strengthening until post-op week 6  Facilitate neuromuscular re-education/maintaining \"C\" shape    Total Direct Treatment Time: 45 min                       Total In Office Time: 50 min    Preferred Name:  Kat:   Past Medical History:   Diagnosis Date    Anxiety     daily meds    Arthritis     Atypical ductal hyperplasia of right breast 12/12/2016    Breast cancer ([facility] [address]     Cancer ([facility] [address]     right breast    Carpal tunnel syndrome     right     Chronic obstructive pulmonary disease (HCC)     does have inhalers at this time and nebulizer    Chronic pain     back    Claustrophobia     DDD (degenerative disc disease), lumbar     pt denies    Ductal carcinoma in situ (DCIS) of right breast 1/17/2017    Seeing Dr. Vito Michele.     GERD (gastroesophageal reflux disease)     no medication now    History of cigar smoking     History of peptic ulcer     years ago History of URI (upper respiratory infection)     History of vitamin D deficiency     Insomnia     Lateral epicondylitis  of elbow     Lumbago     Lumbar radiculopathy     Lumbar stenosis with neurogenic claudication 6/11/2015    Memory difficulty     Mixed hyperlipidemia     Myopathy     Neuroma of foot     right     Nicotine vapor product user     Obesity (BMI 30.0-34.9)     33.7    Other hyperalimentation     Papule     Poor historian     Pure hypercholesterolemia     diet controlled     [facility] major [facility] 04/27/2016    Sees Dr. Pat Moore. H/o cutting. Therapist Donald Odom.      Seizure disorder ([facility] [address]     last seizure -last grand mal july-2017 x 5-; managed with medication; followed by Dr Franc Estrada (neuro)    Seizure disorder ([facility]     head trauma    Smoker     40 yr hx  1 1/2 pk per day    Tobacco abuse    ,   Past Surgical History:   Procedure Laterality Date    ARTHROPLASTY Right 8/10/2022    Right thumb trapeziectomy, ligament reconstruction and tendon interposition, Axillary performed by Willi Pineda MD at [address]  6/15 latest    x4: 2 ruptured one was sciatic nerve; sees Dr. Jeannie Parson  11/2016    dr Jaxon Rivers frequency ablation    BACK SURGERY  08/2017    BACK SURGERY  12/2018    BREAST BIOPSY Bilateral     benign    BREAST BIOPSY Right 1/6/2017    RIGHT BREAST NEEDLE LOCALIZED BIOPSY/ ARRIVE @ 1000 TO PREOP/ BREAST CENTER @ 1130 FOR RIGHT MAMMO NEEDLE LOC performed by Florentin Nick MD at [address] [facility] LUMPECTOMY Right 1/6/2017    RIGHT BREAST LUMPECTOMY performed by Florentin Nick MD at [address] [facility] LUMPECTOMY Right 2/16/2017    EXCISION OF SUPERIOR LUMPECTOMY MARGIN/ RIGHT BREAST performed by Florentin Nick MD at [address] [place] [address] Left     as well as other procedures- bone spur    CARPAL TUNNEL RELEASE Bilateral     COLONOSCOPY  04/2010    COLONOSCOPY  3/13/2019         COLONOSCOPY N/A 3/13/2019    COLONOSCOPY/ 32 performed by Harley Narayanan MD at [address] [place] [address] Right     breast     HEENT      cyst and polyps from vocal cords    MARCOS BIOPSY BREAST STEREOTACTIC Right 11/28/2016    ORTHOPEDIC SURGERY  2016    left foot    TUBAL LIGATION        Medications. : Reviewed in chart  Allergies: Allergies   Allergen Reactions    Oxycodone Itching     Pt states not allergic to    Pregabalin Swelling    Sulfa Antibiotics Itching and Nausea Only    Levofloxacin Rash        SUBJECTIVE     Pt is almost 7 weeks post-op. Pt reports that the \"hard plastic\" of the splint rubs directly over her scar. She added gauze-like material to the inside of the splint to decrease the rubbing and she states this has helped. OBJECTIVE       A/PROM MEASUREMENTS    ROM of uninvolved joints:  Elbow screened- WNL    Thumb AROM: RIGHT LEFT RIGHT  9/20/22    MP ext/flex 22°  75°  40    IP ext/flex 45°  87°  70    Radial add/abduction 50°  60°      Palmar add/abduction 40°  55°      Opposition (Kapandji) 4 10 8        Wrist AROM: RIGHT LEFT     Wrist Extension/Flexion 45/40°  NT°      RD/UD 13/20°  NT°          Treatment:     Therapeutic exercise (97511) x 45 min:  Moist heat pack to right hand   Manual STM to scar area and use of mini vibration wand to decrease sensitivity  Gentle wrist strengthening with 1# DB: wrist F/E and circumduction, radial deviation (add into HEP)   Translation to palm large plastic coins x5 , then translate palm to fingertip individually   Numbered ball roll in palm x3 (1-10) with light press at each number   Placement of x15 pegs in large pegboard while maintaing \"C\" position of thumb   Individual dowel press (gentle) red theraputty   IF abduction to strength first dorsal interossei       ASSESSMENT     Pt progressing well with AROM. She continues to have some degree of hypersensitivity and scar tissue build up at incision site but otherwise doing well.      PLAN      Thumb and wrist AROM  Scar and edema management   Provide light wrist and thumb strengthening exercises next visit 9/27     GOALS     Short term goals:  10/4/2022  (4 weeks)  Patient will be I with HEP. Patient will be independent with making a \"C\" with the thumb and IF to facilitate neuromuscular re-education. Patient will be independent with use of orthosis and post-operative precautions. Increase AROM of affected thumb IP flexion from 45 ° to at least 55 ° to improve function with light ADL's. Increase AROM of affected  thumb MP from 22 ° to at least 35 ° to improve ability to lightly pinch with ADL's like writing. Patient will be able to oppose affected thumb to MF to improve ability to grasp and prehension. Increase AROM affected wrist extension/flexion to [facility] to improve function with dressing and bathing. Pt will be able to make a full composite fist with the affected hand so that the pt is able to grasp and hold objects during self care. Improve Quick DASH functional assessment score from 59% deficit to less than 40% deficit. Long term goals:  11/1/2022  (8 weeks)  Patient will have full thumb opposition for all ADL's/IADL's without difficulty. The patient will have at least 60° IP flexion and 50° MP flexion of affected thumb in order to perform activities that involve grasp. Patient will have at least 30 psi of  strength affected hand to improve ability to grasp and hold an object. Patient will have at least 10 psi of 3 jaw jeny pinch strength affected hand to improve ability to pinch during ADL's like zippers, opening bags and turning a key. Patient will have minimal edema in affected hand/thumb. Pts Quick DASH functional assessment score will be less than 20% functional deficit. [employer]   Access Code: [email or web address]  URL: [email or web address]  Date: 09/06/2022  Prepared by:  Kimmy Human    Exercises  Thumb AROM IP Blocking - 5 x daily - 7 x weekly - 2 sets - 15 reps  Seated Composite Thumb Flexion AROM - 5 x daily - 7 x weekly - 2 sets - 15 reps  Thumb AROM MP Blocking - 5 x daily - 7 x weekly - 2 sets - 15 reps  Thumb AROM Opposition - 5 x daily - 7 x weekly - 2 sets - 15 reps  Wrist Flexion Extension AROM with Fingers Curled and Palm Down - 5 x daily - 7 x weekly - 2 sets - 15 reps  Seated Wrist Radial and Ulnar Deviation AROM - 5 x daily - 7 x weekly - 2 sets - 15 reps  Wrist Circumduction AROM - 5 x daily - 7 x weekly - 2 sets - 15 reps      OT Protocols

## 2022-09-27 ENCOUNTER — OFFICE VISIT (OUTPATIENT)
Dept: ORTHOPEDIC SURGERY | Age: 62
End: 2022-09-27
Payer: MEDICARE

## 2022-09-27 DIAGNOSIS — M18.11 ARTHRITIS OF CARPOMETACARPAL (CMC) JOINT OF RIGHT THUMB: Primary | ICD-10-CM

## 2022-09-27 DIAGNOSIS — G56.03 CARPAL TUNNEL SYNDROME, BILATERAL UPPER LIMBS: ICD-10-CM

## 2022-09-27 DIAGNOSIS — S92.412A DISPLACED FRACTURE OF PROXIMAL PHALANX OF LEFT GREAT TOE, INITIAL ENCOUNTER FOR CLOSED FRACTURE: ICD-10-CM

## 2022-09-27 DIAGNOSIS — M18.0 BILATERAL PRIMARY OSTEOARTHRITIS OF FIRST CARPOMETACARPAL JOINTS: ICD-10-CM

## 2022-09-27 DIAGNOSIS — M65.341 TRIGGER FINGER, RIGHT RING FINGER: ICD-10-CM

## 2022-09-27 DIAGNOSIS — S99.922A UNSPECIFIED INJURY OF LEFT FOOT, INITIAL ENCOUNTER: ICD-10-CM

## 2022-09-27 DIAGNOSIS — S92.412D DISPLACED FRACTURE OF PROXIMAL PHALANX OF LEFT GREAT TOE, SUBSEQUENT ENCOUNTER FOR FRACTURE WITH ROUTINE HEALING: ICD-10-CM

## 2022-09-27 PROCEDURE — 97110 THERAPEUTIC EXERCISES: CPT | Performed by: OCCUPATIONAL THERAPIST

## 2022-09-27 PROCEDURE — 97010 HOT OR COLD PACKS THERAPY: CPT | Performed by: OCCUPATIONAL THERAPIST

## 2022-10-04 ENCOUNTER — OFFICE VISIT (OUTPATIENT)
Dept: ORTHOPEDIC SURGERY | Age: 62
End: 2022-10-04

## 2022-10-04 ENCOUNTER — OFFICE VISIT (OUTPATIENT)
Dept: ORTHOPEDIC SURGERY | Age: 62
End: 2022-10-04
Payer: MEDICARE

## 2022-10-04 DIAGNOSIS — M79.641 PAIN OF RIGHT HAND: ICD-10-CM

## 2022-10-04 DIAGNOSIS — M18.11 ARTHRITIS OF CARPOMETACARPAL (CMC) JOINT OF RIGHT THUMB: Primary | ICD-10-CM

## 2022-10-04 DIAGNOSIS — M18.0 BILATERAL PRIMARY OSTEOARTHRITIS OF FIRST CARPOMETACARPAL JOINTS: Primary | ICD-10-CM

## 2022-10-04 PROCEDURE — 97022 WHIRLPOOL THERAPY: CPT | Performed by: OCCUPATIONAL THERAPIST

## 2022-10-04 PROCEDURE — 97110 THERAPEUTIC EXERCISES: CPT | Performed by: OCCUPATIONAL THERAPIST

## 2022-10-04 PROCEDURE — 99024 POSTOP FOLLOW-UP VISIT: CPT | Performed by: ORTHOPAEDIC SURGERY

## 2022-10-04 NOTE — PROGRESS NOTES
[address] [facility] ORTHOPEDICS - [address] [address]  Dept: [phone]      Occupational Therapy Daily Note      Referring MD: Melissa Jama MD    Diagnosis:     ICD-10-CM    1. Arthritis of carpometacarpal (CMC) joint of right thumb  M18.11       2. Pain of right hand  M79.641            Surgery:  Right thumb trapeziectomy, ligament reconstruction and tendon interposition,  Date 8/10/22     Therapy precautions: Joint arthroplasty  Avoid lateral pinch  Avoid thumb adduction  Avoid wide radial abduction  No strengthening until post-op week 6  Facilitate neuromuscular re-education/maintaining \"C\" shape    Total Direct Treatment Time: 45 min                       Total In Office Time: 50 min    Preferred Name:  Pradip Albarran HISTORY     PMHX & Meds:   Past Medical History:   Diagnosis Date    Anxiety     daily meds    Arthritis     Atypical ductal hyperplasia of right breast 12/12/2016    Breast cancer ([facility] [address]     Cancer ([facility] [address]     right breast    Carpal tunnel syndrome     right     Chronic obstructive pulmonary disease (HCC)     does have inhalers at this time and nebulizer    Chronic pain     back    Claustrophobia     DDD (degenerative disc disease), lumbar     pt denies    Ductal carcinoma in situ (DCIS) of right breast 1/17/2017    Seeing Dr. Karlo Rosenthal.     GERD (gastroesophageal reflux disease)     no medication now    History of cigar smoking     History of peptic ulcer     years ago    History of URI (upper respiratory infection)     History of vitamin D deficiency     Insomnia     Lateral epicondylitis  of elbow     Lumbago     Lumbar radiculopathy     Lumbar stenosis with neurogenic claudication 6/11/2015    Memory difficulty     Mixed hyperlipidemia     Myopathy     Neuroma of foot     right     Nicotine vapor product user     Obesity (BMI 30.0-34.9)     33.7    Other hyperalimentation     Papule     Poor historian     Pure hypercholesterolemia diet controlled     Rec[facility] major [facility] 04/27/2016    Sees Dr. May Stearns. H/o cutting. Therapist Hayde Bragg. Seiz[facility] disorder ([facility]     last seizure -last grand mal july-2017 x 5-; managed with medication; followed by Dr Rios Pineda (neuro)    Seiz[facility] ([facility]     head trauma    Smoker     40 yr hx  1 1/2 pk per day    Tobacco abuse    ,   Past Surgical History:   Procedure Laterality Date    ARTHROPLASTY Right 8/10/2022    Right thumb trapeziectomy, ligament reconstruction and tendon interposition, Axillary performed by Anmol Loera MD at [address] [facility] [address]  6/15 latest    x4: 2 ruptured one was sciatic nerve; sees Dr. Lisbet Monaco  11/2016    dr Brook Jesus frequency ablation    BACK SURGERY  08/2017    BACK SURGERY  12/2018    BREAST BIOPSY Bilateral     benign    BREAST BIOPSY Right 1/6/2017    RIGHT BREAST NEEDLE LOCALIZED BIOPSY/ ARRIVE @ 1000 TO PREOP/ BREAST CENTER @ 1130 FOR RIGHT MAMMO NEEDLE LOC performed by Izzy Ramirez MD at [address] [facility] LUMPECTOMY Right 1/6/2017    RIGHT BREAST LUMPECTOMY performed by Izzy Ramirez MD at [address] [facility] LUMPECTOMY Right 2/16/2017    EXCISION OF SUPERIOR LUMPECTOMY MARGIN/ RIGHT BREAST performed by Izzy Ramirez MD at [address] [place] [address] Left     as well as other procedures- bone spur    CARPAL TUNNEL RELEASE Bilateral     COLONOSCOPY  04/2010    COLONOSCOPY  3/13/2019         COLONOSCOPY N/A 3/13/2019    COLONOSCOPY/ 32 performed by Rina Saucedo MD at [address] [place] [address] Right     breast     HEENT      cyst and polyps from vocal cords    MARCOS BIOPSY BREAST STEREOTACTIC Right 11/28/2016    ORTHOPEDIC SURGERY  2016    left foot    TUBAL LIGATION        Medications. : Reviewed in chart  Allergies:    Allergies   Allergen Reactions    Oxycodone Itching     Pt states not allergic to    Pregabalin Swelling    Sulfa Antibiotics Itching and Nausea Only Levofloxacin Rash        SUBJECTIVE     Pt reports that her follow-up with Dr. Jerrell Kirkpatrick went well. She is no longer required to wear her brace. She states she was working her hand a lot last night and it is slighly more swollen at the scar site. OBJECTIVE       A/PROM MEASUREMENTS    ROM of uninvolved joints:  Elbow screened- WNL    Thumb AROM: RIGHT LEFT RIGHT  9/20/22    MP ext/flex 22°  75°  40    IP ext/flex 45°  87°  70    Radial add/abduction 50°  60°      Palmar add/abduction 40°  55°      Opposition (Kapandji) 4 10 8        Wrist AROM: RIGHT LEFT     Wrist Extension/Flexion 45/40°  NT°      RD/UD 13/20°  NT°        Treatment:    Therapeutic exercise (14323) x 45 min:  Fluidotherapy (93384): Therapist guided AROM in fluidotherapy for heat/ROM prior to exercise and manual intervention to increase available ROM and joint mobility  Gentle wrist strengthening with 1# DB: wrist F/E and circumduction, radial deviation (add into HEP)    small foam blocks with resisted tweezers  Cone rotation red theraputty for wrist strengthening (x20 each direction)   Lateral pinch/pull and fold red theraputty   Theraband flexbar (green) press into red theraputty         ASSESSMENT     Pt progressing well with AROM. She continues to have some degree of hypersensitivity and scar tissue build up at incision site but otherwise doing well. PLAN      Thumb and wrist AROM  Scar and edema management   Take measurements and assess goals next visit     GOALS     Short term goals:  10/4/2022  (4 weeks)  Patient will be I with HEP. Patient will be independent with making a \"C\" with the thumb and IF to facilitate neuromuscular re-education. Patient will be independent with use of orthosis and post-operative precautions. Increase AROM of affected thumb IP flexion from 45 ° to at least 55 ° to improve function with light ADL's.   Increase AROM of affected  thumb MP from 22 ° to at least 35 ° to improve ability to lightly pinch with ADL's like writing. Patient will be able to oppose affected thumb to MF to improve ability to grasp and prehension. Increase AROM affected wrist extension/flexion to [facility] to improve function with dressing and bathing. Pt will be able to make a full composite fist with the affected hand so that the pt is able to grasp and hold objects during self care. Improve Quick DASH functional assessment score from 59% deficit to less than 40% deficit. Long term goals:  11/1/2022  (8 weeks)  Patient will have full thumb opposition for all ADL's/IADL's without difficulty. The patient will have at least 60° IP flexion and 50° MP flexion of affected thumb in order to perform activities that involve grasp. Patient will have at least 30 psi of  strength affected hand to improve ability to grasp and hold an object. Patient will have at least 10 psi of 3 jaw jeny pinch strength affected hand to improve ability to pinch during ADL's like zippers, opening bags and turning a key. Patient will have minimal edema in affected hand/thumb. Pts Quick DASH functional assessment score will be less than 20% functional deficit. [employer] Portal   Access Code: [email or web address]  URL: [email or web address]  Date: 09/06/2022  Prepared by:  Ashley Flaherty  Thumb AROM IP Blocking - 5 x daily - 7 x weekly - 2 sets - 15 reps  Seated Composite Thumb Flexion AROM - 5 x daily - 7 x weekly - 2 sets - 15 reps  Thumb AROM MP Blocking - 5 x daily - 7 x weekly - 2 sets - 15 reps  Thumb AROM Opposition - 5 x daily - 7 x weekly - 2 sets - 15 reps  Wrist Flexion Extension AROM with Fingers Curled and Palm Down - 5 x daily - 7 x weekly - 2 sets - 15 reps  Seated Wrist Radial and Ulnar Deviation AROM - 5 x daily - 7 x weekly - 2 sets - 15 reps  Wrist Circumduction AROM - 5 x daily - 7 x weekly - 2 sets - 15 reps      OT Protocols

## 2022-10-04 NOTE — PROGRESS NOTES
Orthopaedic Hand Surgery Note    Name: Radha Jacobs  YOB: 1960  Gender: female  MRN: [id number]    HPI: Patient is status post Right thumb trapeziectomy, ligament reconstruction and tendon interposition, Axillary - Right on 8/10/2022. Patient reports mild pain and swelling. No numbness, tingling, fevers or chills. Physical Examination:   Wounds healed well. Good finger range of motion. Sensation is intact to light touch in all digits. Mild swelling in the operative wrist. Posture of the thumb is excellent. Negative CMC grind for pain  or crepitus    Assessment:     ICD-10-CM    1. Bilateral primary osteoarthritis of first carpometacarpal joints  M18.0           Status post Right thumb trapeziectomy, ligament reconstruction and tendon interposition, Axillary - Right on 8/10/2022    Plan:   We discussed the treatment and therapy plan. She will continue range of motion and strengthening with therapy. She no longer needs to use her thumb spica orthosis. I did offer her a comfort cool brace but she declined.  I will see her back in 4 weeks    Hellen Goodrich MD  Orthopaedic Surgery  10/04/22  3:57 PM

## 2022-10-10 NOTE — PROGRESS NOTES
[facility] ORTHOPEDICS - [address] [address]  Dept: [phone]      Occupational Therapy Daily Note      Referring MD: Sindy Bose MD    Diagnosis:     ICD-10-CM    1. Arthritis of carpometacarpal (CMC) joint of right thumb  M18.11       2. Pain of right hand  M79.641       3. Wrist pain, right  M25.531       4. Decreased  strength  R29.898            Surgery:  Right thumb trapeziectomy, ligament reconstruction and tendon interposition,  Date 8/10/22     Therapy precautions: Joint arthroplasty  Avoid lateral pinch  Avoid thumb adduction  Avoid wide radial abduction  No strengthening until post-op week 6  Facilitate neuromuscular re-education/maintaining \"C\" shape    Total Direct Treatment Time: 40 min                       Total In Office Time: 50 min    Preferred Name:  Elsie Cosme HISTORY     PMHX & Meds:   Past Medical History:   Diagnosis Date    Anxiety     daily meds    Arthritis     Atypical ductal hyperplasia of right breast 12/12/2016    Bre[facility] ([facility]     Cancer ([facility]     right breast    Carpal tunnel syndrome     right     Chronic obstructive pulmonary disease (HCC)     does have inhalers at this time and nebulizer    Chronic pain     back    Claustrophobia     DDD (degenerative disc disease), lumbar     pt denies    Ductal carcinoma in situ (DCIS) of right breast 1/17/2017    Seeing Dr. Geena Nagel.     GERD (gastroesophageal reflux disease)     no medication now    History of cigar smoking     History of peptic ulcer     years ago    History of URI (upper respiratory infection)     History of vitamin D deficiency     Insomnia     Lateral epicondylitis  of elbow     Lumbago     Lumbar radiculopathy     Lumbar stenosis with neurogenic claudication 6/11/2015    Memory difficulty     Mixed hyperlipidemia     Myopathy     Neuroma of foot     right     Nicotine vapor product user     Obesity (BMI 30.0-34.9)     33.7    Other hyperalimentation     Papule     Poor historian     Pure hypercholesterolemia     diet controlled     [facility] 04/27/2016    Sees Dr. Michelle Ponce. H/o cutting. Therapist Dionicio Hollis. Seizure disorder ([facility]     last seizure -last grand mal july-2017 x 5-; managed with medication; followed by Dr Ramona Kelly (neuro)    Seizure disorder ([facility]     head trauma    Smoker     40 yr hx  1 1/2 pk per day    Tobacco abuse    ,   Past Surgical History:   Procedure Laterality Date    ARTHROPLASTY Right 8/10/2022    Right thumb trapeziectomy, ligament reconstruction and tendon interposition, Axillary performed by Kush Dennis MD at [address]  6/15 latest    x4: 2 ruptured one was sciatic nerve; sees Dr. Aaron Hoang  11/2016    dr Barrett Sports frequency ablation    BACK SURGERY  08/2017    BACK SURGERY  12/2018    BREAST BIOPSY Bilateral     benign    BREAST BIOPSY Right 1/6/2017    RIGHT BREAST NEEDLE LOCALIZED BIOPSY/ ARRIVE @ 1000 TO PREOP/ BREAST CENTER @ 1130 FOR RIGHT MAMMO NEEDLE LOC performed by Neftali Brambila MD at [address] [facility] LUMPECTOMY Right 1/6/2017    RIGHT BREAST LUMPECTOMY performed by Neftali Brambila MD at [address] [facility] LUMPECTOMY Right 2/16/2017    EXCISION OF SUPERIOR LUMPECTOMY MARGIN/ RIGHT BREAST performed by Neftali Brambila MD at [address] [place] [address] Left     as well as other procedures- bone spur    CARPAL TUNNEL RELEASE Bilateral     COLONOSCOPY  04/2010    COLONOSCOPY  3/13/2019         COLONOSCOPY N/A 3/13/2019    COLONOSCOPY/ 32 performed by Aidan Walker MD at [address] [place] [address] Right     breast     HEENT      cyst and polyps from vocal cords    MARCOS BIOPSY BREAST STEREOTACTIC Right 11/28/2016    ORTHOPEDIC SURGERY  2016    left foot    TUBAL LIGATION        Medications. : Reviewed in chart  Allergies:    Allergies   Allergen Reactions    Oxycodone Itching     Pt states not allergic to    Pregabalin Swelling    Sulfa Antibiotics Itching and Nausea Only    Levofloxacin Rash        SUBJECTIVE     Pt is almost 10 weeks post-op. She states she is having some dorsal wrist pain with end range wrist extension. She states \"I think I lifted something a little too heavy. \"     OBJECTIVE       A/PROM MEASUREMENTS    ROM of uninvolved joints:  Elbow screened- WNL    Thumb AROM: RIGHT LEFT RIGHT  9/20/22 RIGHT  10/11/22   MP ext/flex 22°  75°  40 45   IP ext/flex 45°  87°  70 70   Radial add/abduction 50°  60°      Palmar add/abduction 40°  55°      Opposition (Kapandji) 4 10 8 10        Wrist AROM: RIGHT LEFT     Wrist Extension/Flexion 45/40°  NT°      RD/UD 13/20°  NT°        Strength  Strength (psi): RIGHT  10/11/22 LEFT  10/11/22      Position II 30 64     Lat Pinch: 11 17     2pt pinch:       3pt pinch: 7 15          Treatment:    Therapeutic exercise (55304) x 40 min:  Fluidotherapy (04642): Therapist guided AROM in fluidotherapy for heat/ROM prior to exercise and manual intervention to increase available ROM and joint mobility  Raking red theraputty for  strengthening  Intrinsic strengthening with blue foam block x20 between each digit   Numbered ball roll with press at each number (x2 sets)    small foam blocks with red resistive pin   Measurements taken today       ASSESSMENT     Pt making great progress,  and pinch strength measured today. [place] the beach for [place] next [place], so we will skip a week and follow-up on 10/25. We will decide at that point how much more therapy pt will need, if any. PLAN      Thumb and wrist AROM  Scar and edema management   Progressive strengthening   GOALS     Short term goals:  10/4/2022  (4 weeks)  Patient will be I with HEP.   Patient will be independent with making a \"C\" with the thumb and IF to facilitate neuromuscular re-education. (MET)   Patient will be independent with use of orthosis and post-operative precautions. (MET)   Increase AROM of affected thumb IP flexion from 45 ° to at least 55 ° to improve function with light ADL's. (MET )   Increase AROM of affected  thumb MP from 22 ° to at least 35 ° to improve ability to lightly pinch with ADL's like writing. (MET)   Patient will be able to oppose affected thumb to MF to improve ability to grasp and prehension. (MET)   Increase AROM affected wrist extension/flexion to [facility] to improve function with dressing and bathing. (MET)   Pt will be able to make a full composite fist with the affected hand so that the pt is able to grasp and hold objects during self care. (MET_   Improve Quick DASH functional assessment score from 59% deficit to less than 40% deficit. (MET)      Long term goals:  11/1/2022  (8 weeks)  Patient will have full thumb opposition for all ADL's/IADL's without difficulty. The patient will have at least 60° IP flexion and 50° MP flexion of affected thumb in order to perform activities that involve grasp. Patient will have at least 30 psi of  strength affected hand to improve ability to grasp and hold an object. Patient will have at least 10 psi of 3 jaw jeny pinch strength affected hand to improve ability to pinch during ADL's like zippers, opening bags and turning a key. Patient will have minimal edema in affected hand/thumb. Pts Quick DASH functional assessment score will be less than 20% functional deficit. [employer] Portal   Access Code: [email or web address]  URL: [email or web address]  Date: 09/06/2022  Prepared by:  Tatianna Chen    Exercises  Thumb AROM IP Blocking - 5 x daily - 7 x weekly - 2 sets - 15 reps  Seated Composite Thumb Flexion AROM - 5 x daily - 7 x weekly - 2 sets - 15 reps  Thumb AROM MP Blocking - 5 x daily - 7 x weekly - 2 sets - 15 reps  Thumb AROM Opposition - 5 x daily - 7 x weekly - 2 sets - 15 reps  Wrist Flexion Extension AROM with Fingers Curled and Palm Down - 5 x daily - 7 x weekly - 2 sets - 15 reps  Seated Wrist Radial and Ulnar Deviation AROM - 5 x daily - 7 x weekly - 2 sets - 15 reps  Wrist Circumduction AROM - 5 x daily - 7 x weekly - 2 sets - 15 reps      OT Protocols

## 2022-10-11 ENCOUNTER — OFFICE VISIT (OUTPATIENT)
Dept: ORTHOPEDIC SURGERY | Age: 62
End: 2022-10-11
Payer: MEDICARE

## 2022-10-11 DIAGNOSIS — M25.531 WRIST PAIN, RIGHT: ICD-10-CM

## 2022-10-11 DIAGNOSIS — R29.898 DECREASED GRIP STRENGTH: ICD-10-CM

## 2022-10-11 DIAGNOSIS — M18.11 ARTHRITIS OF CARPOMETACARPAL (CMC) JOINT OF RIGHT THUMB: Primary | ICD-10-CM

## 2022-10-11 DIAGNOSIS — M79.641 PAIN OF RIGHT HAND: ICD-10-CM

## 2022-10-11 PROCEDURE — 97022 WHIRLPOOL THERAPY: CPT | Performed by: OCCUPATIONAL THERAPIST

## 2022-10-11 PROCEDURE — 97110 THERAPEUTIC EXERCISES: CPT | Performed by: OCCUPATIONAL THERAPIST

## 2022-10-24 NOTE — PROGRESS NOTES
[facility] ORTHOPEDICS - [address] [address]  Dept: [phone]      Occupational Therapy Daily Note      Referring MD: Tessie Dudley MD    Diagnosis:     ICD-10-CM    1. Arthritis of carpometacarpal (CMC) joint of right thumb  M18.11       2. Pain of right hand  M79.641       3. Wrist pain, right  M25.531       4. Decreased  strength  R29.898            Surgery:  Right thumb trapeziectomy, ligament reconstruction and tendon interposition,  Date 8/10/22     Therapy precautions: Joint arthroplasty  Avoid lateral pinch  Avoid thumb adduction  Avoid wide radial abduction  No strengthening until post-op week 6  Facilitate neuromuscular re-education/maintaining \"C\" shape    Total Direct Treatment Time: 40 min                       Total In Office Time: 50 min    Preferred Name:  Alberto Ramírez HISTORY     PMHX & Meds:   Past Medical History:   Diagnosis Date    Anxiety     daily meds    Arthritis     Atypical ductal hyperplasia of right breast 12/12/2016    Breast cancer ([facility]     Cancer ([facility]     right breast    Carpal tunnel syndrome     right     Chronic obstructive pulmonary disease (HCC)     does have inhalers at this time and nebulizer    Chronic pain     back    Claustrophobia     DDD (degenerative disc disease), lumbar     pt denies    Ductal carcinoma in situ (DCIS) of right breast 1/17/2017    Seeing Dr. Lakshmi Cook.     GERD (gastroesophageal reflux disease)     no medication now    History of cigar smoking     History of peptic ulcer     years ago    History of URI (upper respiratory infection)     History of vitamin D deficiency     Insomnia     Lateral epicondylitis  of elbow     Lumbago     Lumbar radiculopathy     Lumbar stenosis with neurogenic claudication 6/11/2015    Memory difficulty     Mixed hyperlipidemia     Myopathy     Neuroma of foot     right     Nicotine vapor product user     Obesity (BMI 30.0-34.9)     33.7    Other hyperalimentation     Papule     Poor historian     Pure hypercholesterolemia     diet controlled     Recurrent major depressive disorder, in full remission ([facility] 04/27/2016    Sees Dr. Cha Erickson. H/o cutting. Therapist Jorge Hammond. Seizure disorder ([facility]     last seizure -last grand mal july-2017 x 5-; managed with medication; followed by Dr Allen Cazares (neuro)    Seizure disorder ([facility]     head trauma    Smoker     40 yr hx  1 1/2 pk per day    Tobacco abuse    ,   Past Surgical History:   Procedure Laterality Date    ARTHROPLASTY Right 8/10/2022    Right thumb trapeziectomy, ligament reconstruction and tendon interposition, Axillary performed by Cayla Daly MD at [address] [facility] [address]  6/15 latest    x4: 2 ruptured one was sciatic nerve; sees Dr. Keli Granados  11/2016    dr Lam Show frequency ablation    BACK SURGERY  08/2017    BACK SURGERY  12/2018    BREAST BIOPSY Bilateral     benign    BREAST BIOPSY Right 1/6/2017    RIGHT BREAST NEEDLE LOCALIZED BIOPSY/ ARRIVE @ 1000 TO PREOP/ BREAST CENTER @ 1130 FOR RIGHT MAMMO NEEDLE LOC performed by Bert Hinojosa MD at [address] [facility] LUMPECTOMY Right 1/6/2017    RIGHT BREAST LUMPECTOMY performed by Bert Hinojosa MD at [address] [facility] LUMPECTOMY Right 2/16/2017    EXCISION OF SUPERIOR LUMPECTOMY MARGIN/ RIGHT BREAST performed by Bert Hinojosa MD at [address] [place] [address] Left     as well as other procedures- bone spur    CARPAL TUNNEL RELEASE Bilateral     COLONOSCOPY  04/2010    COLONOSCOPY  3/13/2019         COLONOSCOPY N/A 3/13/2019    COLONOSCOPY/ 32 performed by Malorie Mai MD at [address] [place] [address] Right     breast     HEENT      cyst and polyps from vocal cords    MARCOS BIOPSY BREAST STEREOTACTIC Right 11/28/2016    ORTHOPEDIC SURGERY  2016    left foot    TUBAL LIGATION        Medications. : Reviewed in chart  Allergies:    Allergies   Allergen Reactions    Oxycodone Itching     Pt states not allergic to    Pregabalin Swelling    Sulfa Antibiotics Itching and Nausea Only    Levofloxacin Rash        SUBJECTIVE     Pt states that her thumb is doing better but if she picks up something a certain way, she feels a pain \"that shoots up my arm. \"     OBJECTIVE       A/PROM MEASUREMENTS    ROM of uninvolved joints:  Elbow screened- WNL    Thumb AROM: RIGHT LEFT RIGHT  9/20/22 RIGHT  10/11/22   MP ext/flex 22°  75°  40 45   IP ext/flex 45°  87°  70 70   Radial add/abduction 50°  60°      Palmar add/abduction 40°  55°      Opposition (Kapandji) 4 10 8 10        Wrist AROM: RIGHT LEFT     Wrist Extension/Flexion 45/40°  NT°      RD/UD 13/20°  NT°        Strength  Strength (psi): RIGHT  10/11/22 LEFT  10/11/22      Position II 30 64     Lat Pinch: 11 17     2pt pinch:       3pt pinch: 7 15          Treatment:    Therapeutic exercise (35341) x 40 min:  Fluidotherapy (08192): Therapist guided AROM in fluidotherapy for heat/ROM prior to exercise and manual intervention to increase available ROM and joint mobility  Declining dowel press yellow theraputty   Small textured ball  with red resistive pin   KT tape applied to provide support to thumb  Large and small knob rotation yellow theraputty to promote functional grasp         ASSESSMENT     Pt has improved very well. She reports activities and functional movement is returning to normal. One more OT visit in two weeks which will most likely be her discharged date. PLAN      Discharge next visit  Take measurements on last visit     GOALS     Short term goals:  10/4/2022  (4 weeks)  Patient will be I with HEP.   Patient will be independent with making a \"C\" with the thumb and IF to facilitate neuromuscular re-education. (MET)   Patient will be independent with use of orthosis and post-operative precautions. (MET)   Increase AROM of affected thumb IP flexion from 45 ° to at least 55 ° to improve function with light ADL's. (MET )   Increase AROM of affected thumb MP from 22 ° to at least 35 ° to improve ability to lightly pinch with ADL's like writing. (MET)   Patient will be able to oppose affected thumb to MF to improve ability to grasp and prehension. (MET)   Increase AROM affected wrist extension/flexion to [facility] to improve function with dressing and bathing. (MET)   Pt will be able to make a full composite fist with the affected hand so that the pt is able to grasp and hold objects during self care. (MET_   Improve Quick DASH functional assessment score from 59% deficit to less than 40% deficit. (MET)      Long term goals:  11/1/2022  (8 weeks)  Patient will have full thumb opposition for all ADL's/IADL's without difficulty. The patient will have at least 60° IP flexion and 50° MP flexion of affected thumb in order to perform activities that involve grasp. Patient will have at least 30 psi of  strength affected hand to improve ability to grasp and hold an object. Patient will have at least 10 psi of 3 jaw jeny pinch strength affected hand to improve ability to pinch during ADL's like zippers, opening bags and turning a key. Patient will have minimal edema in affected hand/thumb. Pts Quick DASH functional assessment score will be less than 20% functional deficit. [employer] Portal   Access Code: [email or web address]  URL: [email or web address]  Date: 09/06/2022  Prepared by:  Samantha Deep    Exercises  Thumb AROM IP Blocking - 5 x daily - 7 x weekly - 2 sets - 15 reps  Seated Composite Thumb Flexion AROM - 5 x daily - 7 x weekly - 2 sets - 15 reps  Thumb AROM MP Blocking - 5 x daily - 7 x weekly - 2 sets - 15 reps  Thumb AROM Opposition - 5 x daily - 7 x weekly - 2 sets - 15 reps  Wrist Flexion Extension AROM with Fingers Curled and Palm Down - 5 x daily - 7 x weekly - 2 sets - 15 reps  Seated Wrist Radial and Ulnar Deviation AROM - 5 x daily - 7 x weekly - 2 sets - 15 reps  Wrist Circumduction AROM - 5 x daily - 7 x weekly - 2 sets - 15 reps      OT Protocols

## 2022-10-25 ENCOUNTER — OFFICE VISIT (OUTPATIENT)
Dept: ORTHOPEDIC SURGERY | Age: 62
End: 2022-10-25
Payer: MEDICARE

## 2022-10-25 DIAGNOSIS — R29.898 DECREASED GRIP STRENGTH: ICD-10-CM

## 2022-10-25 DIAGNOSIS — M18.11 ARTHRITIS OF CARPOMETACARPAL (CMC) JOINT OF RIGHT THUMB: Primary | ICD-10-CM

## 2022-10-25 DIAGNOSIS — M25.531 WRIST PAIN, RIGHT: ICD-10-CM

## 2022-10-25 DIAGNOSIS — M79.641 PAIN OF RIGHT HAND: ICD-10-CM

## 2022-10-25 PROCEDURE — 97022 WHIRLPOOL THERAPY: CPT | Performed by: OCCUPATIONAL THERAPIST

## 2022-10-25 PROCEDURE — 97110 THERAPEUTIC EXERCISES: CPT | Performed by: OCCUPATIONAL THERAPIST

## 2022-11-08 ENCOUNTER — OFFICE VISIT (OUTPATIENT)
Dept: ORTHOPEDIC SURGERY | Age: 62
End: 2022-11-08
Payer: MEDICARE

## 2022-11-08 DIAGNOSIS — M18.11 ARTHRITIS OF CARPOMETACARPAL (CMC) JOINT OF RIGHT THUMB: Primary | ICD-10-CM

## 2022-11-08 DIAGNOSIS — M79.641 PAIN OF RIGHT HAND: ICD-10-CM

## 2022-11-08 DIAGNOSIS — R29.898 DECREASED GRIP STRENGTH: ICD-10-CM

## 2022-11-08 DIAGNOSIS — M25.531 WRIST PAIN, RIGHT: ICD-10-CM

## 2022-11-08 PROCEDURE — 97110 THERAPEUTIC EXERCISES: CPT | Performed by: OCCUPATIONAL THERAPIST

## 2022-11-08 PROCEDURE — 97022 WHIRLPOOL THERAPY: CPT | Performed by: OCCUPATIONAL THERAPIST

## 2022-11-08 NOTE — PROGRESS NOTES
[facility] [address] [address]  Dept: [phone]      Occupational Therapy Daily Note      Referring MD: Marlena Meza MD    Diagnosis:     ICD-10-CM    1. Arthritis of carpometacarpal (CMC) joint of right thumb  M18.11       2. Pain of right hand  M79.641       3. Wrist pain, right  M25.531       4. Decreased  strength  R29.898            Surgery:  Right thumb trapeziectomy, ligament reconstruction and tendon interposition,  Date 8/10/22     Therapy precautions: Joint arthroplasty  Avoid lateral pinch  Avoid thumb adduction  Avoid wide radial abduction  No strengthening until post-op week 6  Facilitate neuromuscular re-education/maintaining \"C\" shape    Total Direct Treatment Time: 40 min                       Total In Office Time: 50 min    Preferred Name:  Stacy Taylor HISTORY     PMHX & Meds:   Past Medical History:   Diagnosis Date    Anxiety     daily meds    Arthritis     Atypical ductal hyperplasia of right breast 12/12/2016    Breast cancer ([facility]     Cancer ([facility]     right breast    Carpal tunnel syndrome     right     Chronic obstructive pulmonary disease (HCC)     does have inhalers at this time and nebulizer    Chronic pain     back    Claustrophobia     DDD (degenerative disc disease), lumbar     pt denies    Ductal carcinoma in situ (DCIS) of right breast 1/17/2017    Seeing Dr. Edu Bradley.     GERD (gastroesophageal reflux disease)     no medication now    History of cigar smoking     History of peptic ulcer     years ago    History of URI (upper respiratory infection)     History of vitamin D deficiency     Insomnia     Lateral epicondylitis  of elbow     Lumbago     Lumbar radiculopathy     Lumbar stenosis with neurogenic claudication 6/11/2015    Memory difficulty     Mixed hyperlipidemia     Myopathy     Neuroma of foot     right     Nicotine vapor product user     Obesity (BMI 30.0-34.9)     33.7    Other hyperalimentation     Papule     Poor historian     Pure hypercholesterolemia     diet controlled     Recurrent major depressive disorder, in full remission ([facility] 04/27/2016    Sees Dr. May Stearns. H/o cutting. Therapist Hayde Bragg. Seizure disorder ([facility]     last seizure -last grand mal july-2017 x 5-; managed with medication; followed by Dr Rios Pineda (neuro)    [facility]     head trauma    Smoker     40 yr hx  1 1/2 pk per day    Tobacco abuse    ,   Past Surgical History:   Procedure Laterality Date    ARTHROPLASTY Right 8/10/2022    Right thumb trapeziectomy, ligament reconstruction and tendon interposition, Axillary performed by Anmol Loera MD at [address]  6/15 latest    x4: 2 ruptured one was sciatic nerve; sees Dr. Lisbet Monaco  11/2016    dr Brook Jesus frequency ablation    BACK SURGERY  08/2017    BACK SURGERY  12/2018    BREAST BIOPSY Bilateral     benign    BREAST BIOPSY Right 1/6/2017    RIGHT BREAST NEEDLE LOCALIZED BIOPSY/ ARRIVE @ 1000 TO PREOP/ BREAST CENTER @ 1130 FOR RIGHT MAMMO NEEDLE LOC performed by Izzy Ramirez MD at [address] [facility] LUMPECTOMY Right 1/6/2017    RIGHT BREAST LUMPECTOMY performed by Izzy Ramirez MD at [address] [facility] LUMPECTOMY Right 2/16/2017    EXCISION OF SUPERIOR LUMPECTOMY MARGIN/ RIGHT BREAST performed by Izzy Ramirez MD at [address] [place] [address] Left     as well as other procedures- bone spur    CARPAL TUNNEL RELEASE Bilateral     COLONOSCOPY  04/2010    COLONOSCOPY  3/13/2019         COLONOSCOPY N/A 3/13/2019    COLONOSCOPY/ 32 performed by Rina Saucedo MD at [address] [place] [address] Right     breast     HEENT      cyst and polyps from vocal cords    MARCOS BIOPSY BREAST STEREOTACTIC Right 11/28/2016    ORTHOPEDIC SURGERY  2016    left foot    TUBAL LIGATION        Medications. : Reviewed in chart  Allergies:    Allergies   Allergen Reactions    Oxycodone Itching     Pt states not continues to improve, pt given exercises to perform outside of therapy. PLAN      Discharge from formal therapy     GOALS     Short term goals:  10/4/2022  (4 weeks)  Patient will be I with HEP. Patient will be independent with making a \"C\" with the thumb and IF to facilitate neuromuscular re-education. (MET)   Patient will be independent with use of orthosis and post-operative precautions. (MET)   Increase AROM of affected thumb IP flexion from 45 ° to at least 55 ° to improve function with light ADL's. (MET )   Increase AROM of affected  thumb MP from 22 ° to at least 35 ° to improve ability to lightly pinch with ADL's like writing. (MET)   Patient will be able to oppose affected thumb to  to improve ability to grasp and prehension. (MET)   Increase AROM affected wrist extension/flexion to [facility] to improve function with dressing and bathing. (MET)   Pt will be able to make a full composite fist with the affected hand so that the pt is able to grasp and hold objects during self care. (MET_   Improve Quick DASH functional assessment score from 59% deficit to less than 40% deficit. (MET)      Long term goals:  11/1/2022  (8 weeks)  Patient will have full thumb opposition for all ADL's/IADL's without difficulty. The patient will have at least 60° IP flexion and 50° MP flexion of affected thumb in order to perform activities that involve grasp. Patient will have at least 30 psi of  strength affected hand to improve ability to grasp and hold an object. Patient will have at least 10 psi of 3 jaw jeny pinch strength affected hand to improve ability to pinch during ADL's like zippers, opening bags and turning a key. Patient will have minimal edema in affected hand/thumb. Pts Quick DASH functional assessment score will be less than 20% functional deficit. [employer] Portal   Access Code: [email or web address]  URL: [email or web address]  Date: 09/06/2022  Prepared by:  Gena Mccord    Exercises  Thumb AROM IP Blocking - 5 x daily - 7 x weekly - 2 sets - 15 reps  Seated Composite Thumb Flexion AROM - 5 x daily - 7 x weekly - 2 sets - 15 reps  Thumb AROM MP Blocking - 5 x daily - 7 x weekly - 2 sets - 15 reps  Thumb AROM Opposition - 5 x daily - 7 x weekly - 2 sets - 15 reps  Wrist Flexion Extension AROM with Fingers Curled and Palm Down - 5 x daily - 7 x weekly - 2 sets - 15 reps  Seated Wrist Radial and Ulnar Deviation AROM - 5 x daily - 7 x weekly - 2 sets - 15 reps  Wrist Circumduction AROM - 5 x daily - 7 x weekly - 2 sets - 15 reps      OT Protocols

## 2022-11-14 ENCOUNTER — TELEPHONE (OUTPATIENT)
Dept: ONCOLOGY | Age: 62
End: 2022-11-14

## 2022-12-02 ENCOUNTER — HOSPITAL ENCOUNTER (OUTPATIENT)
Dept: MAMMOGRAPHY | Age: 62
Discharge: HOME | End: 2022-12-02
Payer: MEDICARE

## 2022-12-02 DIAGNOSIS — Z12.31 VISIT FOR SCREENING MAMMOGRAM: ICD-10-CM

## 2022-12-02 PROCEDURE — 77063 BREAST TOMOSYNTHESIS BI: CPT

## 2022-12-06 DIAGNOSIS — D64.9 ANEMIA, UNSPECIFIED TYPE: ICD-10-CM

## 2022-12-06 DIAGNOSIS — D50.9 IRON DEFICIENCY ANEMIA, UNSPECIFIED IRON DEFICIENCY ANEMIA TYPE: Primary | ICD-10-CM

## 2022-12-06 DIAGNOSIS — D05.11 DUCTAL CARCINOMA IN SITU (DCIS) OF RIGHT BREAST: ICD-10-CM

## 2022-12-06 DIAGNOSIS — M85.89 OSTEOPENIA OF MULTIPLE SITES: ICD-10-CM

## 2022-12-08 ENCOUNTER — HOSPITAL ENCOUNTER (OUTPATIENT)
Dept: INFUSION THERAPY | Age: 62
Discharge: HOME | End: 2022-12-08

## 2022-12-08 ENCOUNTER — OFFICE VISIT (OUTPATIENT)
Dept: ONCOLOGY | Age: 62
End: 2022-12-08
Payer: MEDICARE

## 2022-12-08 ENCOUNTER — HOSPITAL ENCOUNTER (OUTPATIENT)
Dept: LAB | Age: 62
Discharge: HOME | End: 2022-12-08
Payer: MEDICARE

## 2022-12-08 VITALS
SYSTOLIC BLOOD PRESSURE: 126 MMHG | HEART RATE: 110 BPM | OXYGEN SATURATION: 97 % | RESPIRATION RATE: 16 BRPM | BODY MASS INDEX: 30.46 KG/M2 | TEMPERATURE: 98.4 F | DIASTOLIC BLOOD PRESSURE: 71 MMHG | HEIGHT: 64 IN | WEIGHT: 178.4 LBS

## 2022-12-08 DIAGNOSIS — D05.11 DUCTAL CARCINOMA IN SITU (DCIS) OF RIGHT BREAST: Primary | ICD-10-CM

## 2022-12-08 DIAGNOSIS — D50.9 IRON DEFICIENCY ANEMIA, UNSPECIFIED IRON DEFICIENCY ANEMIA TYPE: ICD-10-CM

## 2022-12-08 DIAGNOSIS — D64.9 ANEMIA, UNSPECIFIED TYPE: ICD-10-CM

## 2022-12-08 DIAGNOSIS — M85.89 OSTEOPENIA OF MULTIPLE SITES: ICD-10-CM

## 2022-12-08 DIAGNOSIS — D05.11 DUCTAL CARCINOMA IN SITU (DCIS) OF RIGHT BREAST: ICD-10-CM

## 2022-12-08 DIAGNOSIS — R91.1 LUNG NODULE: ICD-10-CM

## 2022-12-08 LAB
ALBUMIN SERPL-MCNC: 4.2 G/DL (ref 3.2–4.6)
ALBUMIN/GLOB SERPL: 1.3 {RATIO} (ref 0.4–1.6)
ALP SERPL-CCNC: 123 U/L (ref 50–136)
ALT SERPL-CCNC: 24 U/L (ref 12–65)
ANION GAP SERPL CALC-SCNC: 4 MMOL/L (ref 2–11)
AST SERPL-CCNC: 10 U/L (ref 15–37)
BASOPHILS # BLD: 0.1 K/UL (ref 0–0.2)
BASOPHILS NFR BLD: 1 % (ref 0–2)
BILIRUB SERPL-MCNC: 0.3 MG/DL (ref 0.2–1.1)
BUN SERPL-MCNC: 22 MG/DL (ref 8–23)
CALCIUM SERPL-MCNC: 9.1 MG/DL (ref 8.3–10.4)
CHLORIDE SERPL-SCNC: 108 MMOL/L (ref 101–110)
CO2 SERPL-SCNC: 26 MMOL/L (ref 21–32)
CREAT SERPL-MCNC: 0.9 MG/DL (ref 0.6–1)
DIFFERENTIAL METHOD BLD: ABNORMAL
EOSINOPHIL # BLD: 0.2 K/UL (ref 0–0.8)
EOSINOPHIL NFR BLD: 1 % (ref 0.5–7.8)
ERYTHROCYTE [DISTWIDTH] IN BLOOD BY AUTOMATED COUNT: 12.5 % (ref 11.9–14.6)
FERRITIN SERPL-MCNC: 151 NG/ML (ref 8–388)
GLOBULIN SER CALC-MCNC: 3.2 G/DL (ref 2.8–4.5)
GLUCOSE SERPL-MCNC: 101 MG/DL (ref 65–100)
HCT VFR BLD AUTO: 46.9 % (ref 35.8–46.3)
HGB BLD-MCNC: 15.4 G/DL (ref 11.7–15.4)
HGB RETIC QN AUTO: 35 PG (ref 29–35)
IMM GRANULOCYTES # BLD AUTO: 0.1 K/UL (ref 0–0.5)
IMM GRANULOCYTES NFR BLD AUTO: 0 % (ref 0–5)
IMM RETICS NFR: 9.2 % (ref 3–15.9)
IRON SATN MFR SERPL: 15 %
IRON SERPL-MCNC: 51 UG/DL (ref 35–150)
LYMPHOCYTES # BLD: 3.3 K/UL (ref 0.5–4.6)
LYMPHOCYTES NFR BLD: 22 % (ref 13–44)
MAGNESIUM SERPL-MCNC: 2.1 MG/DL (ref 1.8–2.4)
MCH RBC QN AUTO: 30.8 PG (ref 26.1–32.9)
MCHC RBC AUTO-ENTMCNC: 32.8 G/DL (ref 31.4–35)
MCV RBC AUTO: 93.8 FL (ref 82–102)
MONOCYTES # BLD: 0.7 K/UL (ref 0.1–1.3)
MONOCYTES NFR BLD: 5 % (ref 4–12)
NEUTS SEG # BLD: 10.7 K/UL (ref 1.7–8.2)
NEUTS SEG NFR BLD: 71 % (ref 43–78)
NRBC # BLD: 0 K/UL (ref 0–0.2)
PHOSPHATE SERPL-MCNC: 3.4 MG/DL (ref 2.3–3.7)
PLATELET # BLD AUTO: 363 K/UL (ref 150–450)
PMV BLD AUTO: 8 FL (ref 9.4–12.3)
POTASSIUM SERPL-SCNC: 4 MMOL/L (ref 3.5–5.1)
PROT SERPL-MCNC: 7.4 G/DL (ref 6.3–8.2)
RBC # BLD AUTO: 5 M/UL (ref 4.05–5.2)
RETICS # AUTO: 0.08 M/UL (ref 0.03–0.1)
RETICS/RBC NFR AUTO: 1.7 % (ref 0.3–2)
SODIUM SERPL-SCNC: 138 MMOL/L (ref 133–143)
TIBC SERPL-MCNC: 338 UG/DL (ref 250–450)
WBC # BLD AUTO: 15.1 K/UL (ref 4.3–11.1)

## 2022-12-08 PROCEDURE — G8484 FLU IMMUNIZE NO ADMIN: HCPCS | Performed by: INTERNAL MEDICINE

## 2022-12-08 PROCEDURE — 36415 COLL VENOUS BLD VENIPUNCTURE: CPT

## 2022-12-08 PROCEDURE — G8427 DOCREV CUR MEDS BY ELIG CLIN: HCPCS | Performed by: INTERNAL MEDICINE

## 2022-12-08 PROCEDURE — 85046 RETICYTE/HGB CONCENTRATE: CPT

## 2022-12-08 PROCEDURE — 83540 ASSAY OF IRON: CPT

## 2022-12-08 PROCEDURE — 3017F COLORECTAL CA SCREEN DOC REV: CPT | Performed by: INTERNAL MEDICINE

## 2022-12-08 PROCEDURE — 84100 ASSAY OF PHOSPHORUS: CPT

## 2022-12-08 PROCEDURE — 4004F PT TOBACCO SCREEN RCVD TLK: CPT | Performed by: INTERNAL MEDICINE

## 2022-12-08 PROCEDURE — G8417 CALC BMI ABV UP PARAM F/U: HCPCS | Performed by: INTERNAL MEDICINE

## 2022-12-08 PROCEDURE — 80053 COMPREHEN METABOLIC PANEL: CPT

## 2022-12-08 PROCEDURE — 83735 ASSAY OF MAGNESIUM: CPT

## 2022-12-08 PROCEDURE — 99214 OFFICE O/P EST MOD 30 MIN: CPT | Performed by: INTERNAL MEDICINE

## 2022-12-08 PROCEDURE — 85025 COMPLETE CBC W/AUTO DIFF WBC: CPT

## 2022-12-08 PROCEDURE — 82728 ASSAY OF FERRITIN: CPT

## 2022-12-08 ASSESSMENT — PATIENT HEALTH QUESTIONNAIRE - PHQ9
SUM OF ALL RESPONSES TO PHQ9 QUESTIONS 1 & 2: 2
SUM OF ALL RESPONSES TO PHQ QUESTIONS 1-9: 2
1. LITTLE INTEREST OR PLEASURE IN DOING THINGS: 1
SUM OF ALL RESPONSES TO PHQ QUESTIONS 1-9: 2
SUM OF ALL RESPONSES TO PHQ QUESTIONS 1-9: 2
1. LITTLE INTEREST OR PLEASURE IN DOING THINGS: 1
SUM OF ALL RESPONSES TO PHQ9 QUESTIONS 1 & 2: 2
SUM OF ALL RESPONSES TO PHQ QUESTIONS 1-9: 2
2. FEELING DOWN, DEPRESSED OR HOPELESS: 1
SUM OF ALL RESPONSES TO PHQ QUESTIONS 1-9: 2
2. FEELING DOWN, DEPRESSED OR HOPELESS: 1

## 2022-12-08 NOTE — PATIENT INSTRUCTIONS
Patient Instructions from Today's Visit    Reason for Visit:  Follow up    Plan:  Once you are finished with this prescription of arimidex you can stop because you have completed your 5 years! We will hold off on Prolia for now and send you to a rheumatologist to talk about options. You are due for CT lung screening in March. Follow Up:   Follow up in after CT    Recent Lab Results:  Hospital Outpatient Visit on 12/08/2022   Component Date Value Ref Range Status    WBC 12/08/2022 15.1 (A)  4.3 - 11.1 K/uL Final    RBC 12/08/2022 5.00  4.05 - 5.2 M/uL Final    Hemoglobin 12/08/2022 15.4  11.7 - 15.4 g/dL Final    Hematocrit 12/08/2022 46.9 (A)  35.8 - 46.3 % Final    MCV 12/08/2022 93.8  82.0 - 102.0 FL Final    MCH 12/08/2022 30.8  26.1 - 32.9 PG Final    MCHC 12/08/2022 32.8  31.4 - 35.0 g/dL Final    RDW 12/08/2022 12.5  11.9 - 14.6 % Final    Platelets 06/34/5983 363  150 - 450 K/uL Final    MPV 12/08/2022 8.0 (A)  9.4 - 12.3 FL Final    nRBC 12/08/2022 0.00  0.0 - 0.2 K/uL Final    **Note: Absolute NRBC parameter is now reported with Hemogram**    Seg Neutrophils 12/08/2022 71  43 - 78 % Final    Lymphocytes 12/08/2022 22  13 - 44 % Final    Monocytes 12/08/2022 5  4.0 - 12.0 % Final    Eosinophils % 12/08/2022 1  0.5 - 7.8 % Final    Basophils 12/08/2022 1  0.0 - 2.0 % Final    Immature Granulocytes 12/08/2022 0  0.0 - 5.0 % Final    Segs Absolute 12/08/2022 10.7 (A)  1.7 - 8.2 K/UL Final    Absolute Lymph # 12/08/2022 3.3  0.5 - 4.6 K/UL Final    Absolute Mono # 12/08/2022 0.7  0.1 - 1.3 K/UL Final    Absolute Eos # 12/08/2022 0.2  0.0 - 0.8 K/UL Final    Basophils Absolute 12/08/2022 0.1  0.0 - 0.2 K/UL Final    Absolute Immature Granulocyte 12/08/2022 0.1  0.0 - 0.5 K/UL Final    Differential Type 12/08/2022 AUTOMATED    Final    Sodium 12/08/2022 138  133 - 143 mmol/L Final    Potassium 12/08/2022 4.0  3.5 - 5.1 mmol/L Final    Chloride 12/08/2022 108  101 - 110 mmol/L Final    CO2 12/08/2022 26 21 - 32 mmol/L Final    Anion Gap 12/08/2022 4  2 - 11 mmol/L Final    Glucose 12/08/2022 101 (A)  65 - 100 mg/dL Final    BUN 12/08/2022 22  8 - 23 MG/DL Final    Creatinine 12/08/2022 0.90  0.6 - 1.0 MG/DL Final    Est, Glom Filt Rate 12/08/2022 >60  >60 ml/min/1.73m2 Final    Comment:      Pediatric calculator link: Saurav.at. org/professionals/kdoqi/gfr_calculatorped       These results are not intended for use in patients <25years of age. eGFR results are calculated without a race factor using  the 2021 CKD-EPI equation. Careful clinical correlation is recommended, particularly when comparing to results calculated using previous equations. The CKD-EPI equation is less accurate in patients with extremes of muscle mass, extra-renal metabolism of creatinine, excessive creatine ingestion, or following therapy that affects renal tubular secretion. Calcium 12/08/2022 9.1  8.3 - 10.4 MG/DL Final    Total Bilirubin 12/08/2022 0.3  0.2 - 1.1 MG/DL Final    ALT 12/08/2022 24  12 - 65 U/L Final    AST 12/08/2022 10 (A)  15 - 37 U/L Final    Alk Phosphatase 12/08/2022 123  50 - 136 U/L Final    Total Protein 12/08/2022 7.4  6.3 - 8.2 g/dL Final    Albumin 12/08/2022 4.2  3.2 - 4.6 g/dL Final    Globulin 12/08/2022 3.2  2.8 - 4.5 g/dL Final    Albumin/Globulin Ratio 12/08/2022 1.3  0.4 - 1.6   Final    Ferritin 12/08/2022 151  8 - 388 NG/ML Final    Reticulocyte Count,Automated 12/08/2022 1.7  0.3 - 2.0 % Final    Absolute Retic # 12/08/2022 0.0845  0.026 - 0.095 M/ul Final    Immature Retic Fraction 12/08/2022 9.2  3.0 - 15.9 % Final    Retic Hemoglobin conc. 12/08/2022 35  29 - 35 pg Final    Iron 12/08/2022 51  35 - 150 ug/dL Final    Comment: Known Interfering Substances section:  \"Iron values may be falsely elevated in  serum samples from patients with  anticoagulants (e.g., hemodialysis patients). \"  Limitations of Procedure section:  \"Turbidity resulting from precipitation of  fibrinogen in the serum of patients treated  with anticoagulants (e.g. hemodialysis  patients) may cause spuriously elevated  iron results. \"      TIBC 12/08/2022 338  250 - 450 ug/dL Final    TRANSFERRIN SATURATION 12/08/2022 15 (A)  >20 % Final    Magnesium 12/08/2022 2.1  1.8 - 2.4 mg/dL Final    Phosphorus 12/08/2022 3.4  2.3 - 3.7 MG/DL Final        Treatment Summary has been discussed and given to patient: n/a        -------------------------------------------------------------------------------------------------------------------  Please call our office at [phone] if you have any  of the following symptoms:   Fever of 100.5 or greater  Chills  Shortness of breath  Swelling or pain in one leg    After office hours an answering service is available and will contact a provider for emergencies or if you are experiencing any of the above symptoms. Patient did express an interest in My Chart. My Chart log in information explained on the after visit summary printout at the [facility] desk.     Jamal Ch RN

## 2022-12-08 NOTE — PROGRESS NOTES
Data Source: Patient, [facility] record. 12/8/2022    3:50 PM    [place] [id number]    64 y.o. Patient Encounter: [facility] Visit    Cancer Diagnosis:  RT DCIS  Pulmonary nodule  Stage:  0  Performance Status:  1  Code Status:  Not discussed  Onc History (Copied from prior):   56F, previously , on disability r/t multiple back surgeries, post menopausal (reports last period was over 2 years ago),denies HRT, denies family history of breast cancer,  seizure disorder r/t h/o distant trauma, depression (on lexapro), dyslipidemia, osteopenia, COPD (not requiring O2), bilateral, carpal tunnel syndrome, had recently been seen for RT breast pain: bilateral diagnostic mammogram and ultrasound on 11/22/16 revealed RT posterior microcalcifications at 10:30 position for which biopsy was recommended. Underwent needle biopsy on 11/18/16 which showed fibrocystic mastopathy having atypical IDH and microcalcifications. Underwent right lumpectomy w Dr Edu Bradley on 3/1/05, final pathology showing 1 cm x 0.4 cm intermediate grade DCIS (ER +ve 99%, DC +ve 35%) with superior margin being close at 0.1 cm. B/l MRI breast 1/16/17 without other suspicious areas. Re-excision on 2/16/17 performed and did not show any residual disease. She has since been referred to us for further management. Patient is scheduled for back surgery on 3/23/17. Hospice Referral:  NA  Interval History:  12/8/22: Reports doing reasonably. Regular with her Arimidex. Denies any new lumps or bumps, aches or pains. Continues to smoke though trying to quit. We will send annual bilateral breast mammogram 12/2/2022 negative. Given patient has completed 5 years of Arimidex, okay to stop. DEXA scan 5/22 with osteopenia with T score -2. Will refer to rheumatology for role of bone directed therapy. We will also repeat chest imaging prior to next visit in 3 months.   REVIEW OF SYSTEMS:  As mentioned above, all other systems were reviewed in full and are negative. Past Medical History:   Diagnosis Date    Anxiety     daily meds    Arthritis     Atypical ductal hyperplasia of right breast 12/12/2016    Breast cancer ([facility]     Cancer ([facility]     right breast    Carpal tunnel syndrome     right     Chronic obstructive pulmonary disease (HCC)     does have inhalers at this time and nebulizer    Chronic pain     back    Claustrophobia     DDD (degenerative disc disease), lumbar     pt denies    Ductal carcinoma in situ (DCIS) of right breast 1/17/2017    Seeing Dr. Kamini Gray. GERD (gastroesophageal reflux disease)     no medication now    History of cigar smoking     History of peptic ulcer     years ago    History of URI (upper respiratory infection)     History of vitamin D deficiency     Insomnia     Lateral epicondylitis  of elbow     Lumbago     Lumbar radiculopathy     Lumbar stenosis with neurogenic claudication 6/11/2015    Memory difficulty     Mixed hyperlipidemia     Myopathy     Neuroma of foot     right     Nicotine vapor product user     Obesity (BMI 30.0-34.9)     33.7    Other hyperalimentation     Papule     Poor historian     Pure hypercholesterolemia     diet controlled     Recurrent major depressive disorder, in full remission ([facility] 04/27/2016    Sees Dr. Esther Donald. H/o cutting. Therapist Mikie Hanna.      Seizure disorder ([facility]     last seizure -last grand mal july-2017 x 5-; managed with medication; followed by Dr Dilia Flores (neuro)    Seizure disorder [facility])     head trauma    Smoker     40 yr hx  1 1/2 pk per day    Tobacco abuse        Past Surgical History:   Procedure Laterality Date    ARTHROPLASTY Right 8/10/2022    Right thumb trapeziectomy, ligament reconstruction and tendon interposition, Axillary performed by Sultana Luna MD at [address]  6/15 latest    x4: 2 ruptured one was sciatic nerve; sees Dr. Melendez Serve  11/2016    dr Cory Canas frequency ablation    BACK SURGERY  08/2017    BACK SURGERY  12/2018    BREAST BIOPSY Bilateral     benign    BREAST BIOPSY Right 1/6/2017    RIGHT BREAST NEEDLE LOCALIZED BIOPSY/ ARRIVE @ 1000 TO PREOP/ BREAST CENTER @ 1130 FOR RIGHT MAMMO NEEDLE LOC performed by Colin Valdez MD at [address] [facility] LUMPECTOMY Right 1/6/2017    RIGHT BREAST LUMPECTOMY performed by Colin Valdez MD at [address] [facility] LUMPECTOMY Right 2/16/2017    EXCISION OF SUPERIOR LUMPECTOMY MARGIN/ RIGHT BREAST performed by Colin Valdez MD at [address] [place] [address] Left     as well as other procedures- bone spur    CARPAL TUNNEL RELEASE Bilateral     COLONOSCOPY  04/2010    COLONOSCOPY  3/13/2019         COLONOSCOPY N/A 3/13/2019    COLONOSCOPY/ 32 performed by Iemlda Pike MD at [facility] ENDOSCOPY    CYST REMOVAL Right     breast     HEENT      cyst and polyps from vocal cords    MARCOS BIOPSY BREAST STEREOTACTIC Right 11/28/2016    ORTHOPEDIC SURGERY  2016    left foot    TUBAL LIGATION         Current Outpatient Medications   Medication Sig Dispense Refill    ALPRAZolam (XANAX) 0.5 MG tablet Take 0.5 mg by mouth 3 times daily as needed for Sleep.      magnesium (MAGNESIUM-OXIDE) 250 MG TABS tablet Take 250 mg by mouth in the morning. Multiple Vitamins-Minerals (HAIR SKIN NAILS PO) Take by mouth daily      anastrozole (ARIMIDEX) 1 MG tablet Take 1 tablet by mouth daily 90 tablet 3    albuterol sulfate  (90 Base) MCG/ACT inhaler Inhale into the lungs      aspirin 81 MG EC tablet Take 81 mg by mouth daily      DULoxetine (CYMBALTA) 60 MG extended release capsule Take 60 mg by mouth 2 times daily      lacosamide (VIMPAT) 200 MG tablet Take 200 mg by mouth 2 times daily. traZODone (DESYREL) 100 MG tablet Take 100 mg by mouth nightly      zonisamide (ZONEGRAN) 100 MG capsule Take 300 mg by mouth nightly      ascorbic acid (VITAMIN C) 1000 MG tablet Take 1,000 mg by mouth in the morning.  (Patient not taking: Reported on 12/8/2022)      COLLAGEN-VITAMIN C-BIOTIN PO Take by mouth daily (Patient not taking: Reported on 12/8/2022)      Cholecalciferol (VITAMIN D3) 1.25 MG (48178 UT) CAPS Take by mouth daily (Patient not taking: Reported on 12/8/2022)      albuterol (PROVENTIL) (2.5 MG/3ML) 0.083% nebulizer solution Inhale 2.5 mg into the lungs every 4 hours as needed (Patient not taking: Reported on 12/8/2022)      atorvastatin (LIPITOR) 20 MG tablet Take 20 mg by mouth daily (Patient not taking: Reported on 12/8/2022)      Naproxen Sodium 220 MG CAPS Take by mouth as needed (Patient not taking: Reported on 12/8/2022)       No current facility-administered medications for this visit. Social History     Socioeconomic History    Marital status:      Spouse name: None    Number of children: None    Years of education: None    Highest education level: None   Tobacco Use    Smoking status: Every Day     Packs/day: 0.25     Types: Cigarettes    Smokeless tobacco: Never    Tobacco comments:     Quit smoking: tobacco; recently cut down to 6 per day   Vaping Use    Vaping Use: Never used   Substance and Sexual Activity    Alcohol use: No    Drug use: No       Family History   Problem Relation Age of Onset    Breast Cancer Sister 71    Stroke Brother     Diabetes Brother     Hypertension Father     Lung Disease Father         copd    Heart Disease Father     Hypertension Mother     Cancer Brother         Esophageal    Alcohol Abuse Father     Pacemaker Father        Allergies   Allergen Reactions    Oxycodone Itching     Pt states not allergic to    Pregabalin Swelling    Sulfa Antibiotics Itching and Nausea Only    Levofloxacin Rash       PHYSICAL EXAMINATION:  General Appearance: Healthy appearing patient in no acute distress  Vitals reviewed.    /71 (Site: Left Upper Arm, Position: Sitting, Cuff Size: Medium Adult)   Pulse (!) 110   Temp 98.4 °F (36.9 °C) (Oral)   Resp 16   Ht 5' 4\" (1.626 m)   Wt 178 lb 6.4 oz (80.9 kg)   SpO2 97%   BMI 30.62 kg/m²   HEENT: No oral or pharyngeal masses, ulceration or thrush noted, no sinus tenderness. Neck is supple with no thyromegaly or JVD noted. Lymph Nodes: No lymphadenopathy noted in the occipital, pre and post auricular, cervical, supra and infraclavicular, axillary, epitrochlear, inguinal, and popliteal region. Breasts: No palpable masses, nipple discharge or skin retraction  Lungs/Thorax: Clear to auscultation, no accessory muscles of respiration being used. Heart: Regular rate and rhythm, normal S1, S2, no appreciable murmurs, rubs, gallops  Abdomen: Soft, nontender, bowel sounds present, no appreciable hepatosplenomegaly, no palpable masses  Extremeties: Good pulses bilaterally, no peripheral edema. Skin: Normal skin tone with no rash, petechiae, ecchymosis noted. Musculoskeletal: No pain on palpation over bony prominence, no edema, no evidence of gout, no joint or bony deformity  Neurologic: Grossly intact        LABS/IMAGING:    Lab Results   Component Value Date/Time    WBC 15.1 12/08/2022 11:20 AM    HGB 15.4 12/08/2022 11:20 AM    HCT 46.9 12/08/2022 11:20 AM     12/08/2022 11:20 AM    MCV 93.8 12/08/2022 11:20 AM       Lab Results   Component Value Date/Time     12/08/2022 11:20 AM    K 4.0 12/08/2022 11:20 AM     12/08/2022 11:20 AM    CO2 26 12/08/2022 11:20 AM    BUN 22 12/08/2022 11:20 AM    GFRAA >60 06/09/2022 09:38 AM    GLOB 3.2 12/08/2022 11:20 AM    ALT 24 12/08/2022 11:20 AM         Above results reviewed with patient.       ASSESSMENT:  64 F, previously , on disability r/t multiple back surgeries, post menopausal (reports last period was about 5 years ago), denies HRT, denies family history of breast cancer,  seizure disorder r/t h/o distant trauma, depression (on lexapro), dyslipidemia, osteopenia, COPD (not requiring O2), bilateral, carpal tunnel syndrome, had recently been seen for RT breast pain: bilateral diagnostic mammogram and ultrasound on 11/22/16 revealed RT posterior microcalcifications at 10:30 position for which biopsy was recommended. Underwent needle biopsy on 11/18/16 which showed fibrocystic mastopathy having atypical IDH and microcalcifications. Underwent right lumpectomy w Dr Cm Hooker on 7/9/15, final pathology showing 1 cm x 0.4 cm intermediate grade DCIS (ER +ve 99%, FL +ve 35%) with superior margin being close at 0.1 cm. B/l MRI breast 1/16/17 without other suspicious areas. Re-excision on 2/16/17 performed and did not show any residual disease. She has since been referred to us for further management. Patient is scheduled for back surgery on 3/23/17. In summary, pleasant middle aged female with RT sided intermediate grade, 1 cm DCIS s/p resection. Diagnosis, prognosis and therapy options/risk reduction techniques discussed. She would like yo proceed w endocrine therapy. 05/17: Doing well. Finishing XRT in a week. Tolerating Arimidex (post-menopausal, reports last menstrual period approx 5 years ago), some hot flashes. Lipids not fasting today. 11/17: S/p back repeat back surgery 08/17. Regular w Arimidex, recent b/l mammogram -ve, repeat in 1 year. Notes recurring LT medial chest cystic swelling w recurrent drainage over last year or so: will have surgery evaluate, doubt malignancy but may need to be lanced. Mildly elevated triglycerides, on cholesterol medication, copy given to f/u w primary care. Given extensive smoking history, will start annual low dose CT chest for lung malignancy screening. White count elevated over last year: initiate w/u    07/18: Reports doing well. Continues to have chronic back pain, and plans to get a stimulator placed. Reports regular w arimidex, tolerating well. Due for annual mammgram 11/18. Low dose screening chest CT 01/17 was -ve, repeat in 1 year. Leukocytosis/neutrophilia remains: w/u to date including peripheral blood smear review and flow cytometry -ve.  Jak2 exon 12-14 and BCR ABL -ve: denies any recent infections, or steroid use. Options discussed, will proceed w BM biopsy. RTC in 4 months w labs, BM biopsy, b/l mammogram.       12/18: Leukocytosis resolved. BM biopsy unremarkable (30-35% cellularity without any evidence of primary marrow disorder, cytogenetics normal), of note low to absent iron stores reported: denies any bleeding, reports last screening colonoscopy w EGD was approx 2 years ago and was unremarkable: advise to discuss w GI regarding low iron stores, also advised pelvic exam w gyn. Check serum iron stores and then start vitron C bid. Noted some Rt breast tenderness a few months ago, has pet dog that jumps on her chest, recent b/l annual mammogram -ve, due for b/l breast MRI (as new baseline 18 months after XRT). Also due for her low dose CT chest.     0 2/19: Denies any new complaints. Status post recent lower back stimulator placement. Remains on Arimidex, tolerating well. Could not get bilateral breast MRI due to stimulator. Will simply get repeat bilateral annual mammograms 11/19. Mild episodic reactive neutrophilia persists (WBC was normal range last time, mildly high today). Labs with iron deficiency, will plan for IV ferric carboxymaltose x2. She has seen GI with plans for colon/EGD in 3/19. She reports being up-to-date with her pelvic evaluation. Low-dose screening CT chest 1/19 -ve. 8/8/2019: Denies any new lumps or bumps, tolerating Arimidex which she will continue. Recent DEXA scan: Remains osteopenic, continue vitamin D supplementation. Status post IV iron x2, clinically feels improved, iron stores better. S seen by GI[facility] polyps, diverticulosis and IH. Given large complex polyp removed in piecemeal fashion around ileocecal valve, plan to repeat colonoscopy in 6 months (9/19).     7/2/2020: Last mammogram 1//19 unremarkable. Reports regular with her Arimidex once daily.   Denies any new lumps or bumps, good p.o. intake and mobility. Now on a keto diet having lost 23 pounds intentionally. Chronic back pain persists. Did not undergo low-dose CT chest (scheduled for 1/20), or repeat colonoscopy. She will be due for bilateral mammogram in 11/20. Educated on importance of following up for her recommended repeat colonoscopy per GI. She reports understanding and will reach out to them. We will see her back in 4 months with labs, iron panel, LD CT chest, bilateral mammogram.    11/20/2020: Patient continues to report doing well. Regular with her Arimidex. Denies any new lumps or bumps. Does report some right flank discomfort below her RT breast crease when she bends over to blow dry her hair, otherwise denies any pain. This is chronic and she feels likely musculoskeletal.  No palpable lesions or adenopathy on exam today. Recent bilateral mammograms negative. Recent low-dose CT chest unremarkable. Labs unremarkable. Iron stores adequate. Patient reassured, continue current care. We will see her back in 6 months. RTC in 6 months with labs, iron panel. 12/3/2021: Since last visit was seen by ENT for bilateral parotid gland lesions picked up incidentally while being worked up for atherosclerotic disease of her neck. 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 Regularly sees neurology for chronic seizure disorder. Remains on Arimidex, reports compliance. Minimal hot flashes. Recent bilateral breast mammogram 11/21 -ve. Exam without palpable lesions, lab work unremarkable low iron stores following: Repeat IV iron x1. Due for screening low-dose CT chest, will be ordered and followed. We will otherwise see her back in 6 months time with repeat labs, as well as DEXA scan. 3/16/2022: Reports doing well.   Her annual low-dose CT chest had shown a new superior segment RLL 9x4 mm nodule, we will plan to monitor it with repeat CT chest 3 months later. Patient now presents to the office with results. CT chest without contrast from today with decreasing size of the RLL nodule, now measuring 5 x 1 mm only. Patient reassured, this is almost certainly benign. We will simply repeat CT chest with her next mammogram in 11/22. She will keep her prior appointment with us in 3 months time with DEXA scan. 12/8/22: Reports doing reasonably. Regular with her Arimidex. Denies any new lumps or bumps, aches or pains. Continues to smoke though trying to quit. We will send annual bilateral breast mammogram 12/2/2022 negative. Given patient has completed 5 years of Arimidex, okay to stop. DEXA scan 5/22 with osteopenia with T score -2. Will refer to rheumatology for role of bone directed therapy. We will also repeat chest imaging prior to next visit in 3 months. 1. RT DCIS  2. Leukocytosis: episodic   3. Iron deficiency picked up on BM biopsy  4. Significant smoking history    PLAN:  - As above. Completed Arimidex (5/17-12/22)  - B/l annual mammograms. Next in 12/23.   - Dexa scan w osteopenia: Vit D 1000 unit once daily. Refer to rheumatology  - Significant smoking history: low dose screening annually, chest CT 1/19 and 11/20 -ve, LD CT chest 12/21 w RLL nodule, now resolving. Repeat CT chest prior to next visit  - Iron deficiency on BM biopsy (without anemia) and serum: iv iron prn. Seeing GI: f/u w them as needed. Reports being uptodate w gyn eval.   - Neutrophilia, now resoelved: blood smear, blood flow, BCR and Jak2 (exon 12-14), ESR/CRP, BM biopsy unremarkable.   - Dyslipidemia: per primary care    RTC in 3 months with labs, iron panel, CT chest. Annual visits thereafter      Juanis Coronado MD  [address] [place] [address],[address]  Hematology Oncology  [address] [place] [address]  Office : [phone]  Fax : [phone]

## 2023-01-23 NOTE — PROGRESS NOTES
Pt in CT for procedure Price (Do Not Change): 0.00 Detail Level: Simple Instructions: This plan will send the code FBSE to the PM system.  DO NOT or CHANGE the price.

## 2023-02-07 ENCOUNTER — NURSE ONLY (OUTPATIENT)
Dept: FAMILY MEDICINE CLINIC | Facility: CLINIC | Age: 63
End: 2023-02-07

## 2023-02-07 ENCOUNTER — OFFICE VISIT (OUTPATIENT)
Dept: FAMILY MEDICINE CLINIC | Facility: CLINIC | Age: 63
End: 2023-02-07
Payer: COMMERCIAL

## 2023-02-07 VITALS
OXYGEN SATURATION: 96 % | BODY MASS INDEX: 31.65 KG/M2 | HEART RATE: 106 BPM | TEMPERATURE: 97.5 F | WEIGHT: 185.4 LBS | DIASTOLIC BLOOD PRESSURE: 82 MMHG | SYSTOLIC BLOOD PRESSURE: 119 MMHG | RESPIRATION RATE: 16 BRPM | HEIGHT: 64 IN

## 2023-02-07 DIAGNOSIS — Z72.0 TOBACCO ABUSE: ICD-10-CM

## 2023-02-07 DIAGNOSIS — Z12.11 SCREENING FOR MALIGNANT NEOPLASM OF COLON: ICD-10-CM

## 2023-02-07 DIAGNOSIS — R05.1 ACUTE COUGH: ICD-10-CM

## 2023-02-07 DIAGNOSIS — E78.00 HIGH CHOLESTEROL: ICD-10-CM

## 2023-02-07 DIAGNOSIS — J45.20 MILD INTERMITTENT ASTHMA WITHOUT COMPLICATION: ICD-10-CM

## 2023-02-07 DIAGNOSIS — J41.0 SIMPLE CHRONIC BRONCHITIS (HCC): ICD-10-CM

## 2023-02-07 DIAGNOSIS — J01.00 ACUTE NON-RECURRENT MAXILLARY SINUSITIS: ICD-10-CM

## 2023-02-07 DIAGNOSIS — R42 DIZZINESS: Primary | ICD-10-CM

## 2023-02-07 DIAGNOSIS — R42 DIZZINESS: ICD-10-CM

## 2023-02-07 LAB
BASOPHILS # BLD: 0.1 K/UL (ref 0–0.2)
BASOPHILS NFR BLD: 1 % (ref 0–2)
DIFFERENTIAL METHOD BLD: ABNORMAL
EOSINOPHIL # BLD: 0.1 K/UL (ref 0–0.8)
EOSINOPHIL NFR BLD: 1 % (ref 0.5–7.8)
ERYTHROCYTE [DISTWIDTH] IN BLOOD BY AUTOMATED COUNT: 12.8 % (ref 11.9–14.6)
HCT VFR BLD AUTO: 45.4 % (ref 35.8–46.3)
HGB BLD-MCNC: 15.2 G/DL (ref 11.7–15.4)
IMM GRANULOCYTES # BLD AUTO: 0.1 K/UL (ref 0–0.5)
IMM GRANULOCYTES NFR BLD AUTO: 0 % (ref 0–5)
LYMPHOCYTES # BLD: 4.2 K/UL (ref 0.5–4.6)
LYMPHOCYTES NFR BLD: 33 % (ref 13–44)
MCH RBC QN AUTO: 31.6 PG (ref 26.1–32.9)
MCHC RBC AUTO-ENTMCNC: 33.5 G/DL (ref 31.4–35)
MCV RBC AUTO: 94.4 FL (ref 82–102)
MONOCYTES # BLD: 0.9 K/UL (ref 0.1–1.3)
MONOCYTES NFR BLD: 8 % (ref 4–12)
NEUTS SEG # BLD: 7.2 K/UL (ref 1.7–8.2)
NEUTS SEG NFR BLD: 57 % (ref 43–78)
NRBC # BLD: 0 K/UL (ref 0–0.2)
PLATELET # BLD AUTO: 340 K/UL (ref 150–450)
PMV BLD AUTO: 8.4 FL (ref 9.4–12.3)
RBC # BLD AUTO: 4.81 M/UL (ref 4.05–5.2)
WBC # BLD AUTO: 12.6 K/UL (ref 4.3–11.1)

## 2023-02-07 PROCEDURE — G8417 CALC BMI ABV UP PARAM F/U: HCPCS | Performed by: FAMILY MEDICINE

## 2023-02-07 PROCEDURE — 3023F SPIROM DOC REV: CPT | Performed by: FAMILY MEDICINE

## 2023-02-07 PROCEDURE — 99214 OFFICE O/P EST MOD 30 MIN: CPT | Performed by: FAMILY MEDICINE

## 2023-02-07 PROCEDURE — G8484 FLU IMMUNIZE NO ADMIN: HCPCS | Performed by: FAMILY MEDICINE

## 2023-02-07 PROCEDURE — 3017F COLORECTAL CA SCREEN DOC REV: CPT | Performed by: FAMILY MEDICINE

## 2023-02-07 PROCEDURE — G8427 DOCREV CUR MEDS BY ELIG CLIN: HCPCS | Performed by: FAMILY MEDICINE

## 2023-02-07 PROCEDURE — 4004F PT TOBACCO SCREEN RCVD TLK: CPT | Performed by: FAMILY MEDICINE

## 2023-02-07 RX ORDER — ROSUVASTATIN CALCIUM 10 MG/1
10 TABLET, COATED ORAL NIGHTLY
Qty: 30 TABLET | Refills: 3 | Status: SHIPPED | OUTPATIENT
Start: 2023-02-07

## 2023-02-07 RX ORDER — AMOXICILLIN 500 MG/1
500 CAPSULE ORAL 2 TIMES DAILY
Qty: 20 CAPSULE | Refills: 0 | Status: SHIPPED | OUTPATIENT
Start: 2023-02-07 | End: 2023-02-17

## 2023-02-07 RX ORDER — ALBUTEROL SULFATE 90 UG/1
2 AEROSOL, METERED RESPIRATORY (INHALATION) 4 TIMES DAILY
Qty: 18 G | Refills: 11 | Status: SHIPPED | OUTPATIENT
Start: 2023-02-07

## 2023-02-07 RX ORDER — BENZONATATE 100 MG/1
CAPSULE ORAL
Qty: 20 CAPSULE | Refills: 0 | Status: SHIPPED | OUTPATIENT
Start: 2023-02-07

## 2023-02-07 RX ORDER — NICOTINE 21 MG/24HR
1 PATCH, TRANSDERMAL 24 HOURS TRANSDERMAL EVERY 24 HOURS
Qty: 30 PATCH | Refills: 3 | Status: SHIPPED | OUTPATIENT
Start: 2023-02-07 | End: 2024-02-07

## 2023-02-07 NOTE — PROGRESS NOTES
[address] [facility] [address]  Phone: [phone] Fax [phone]  Deandre Pappas MD  2/7/2023           Ms. Adrianna Ramirez  is a 58y.o.  year old  female patient who comes in for follow-up after having seen her neurologist and having gone for an angiogram because of dizziness. It did show that she had very small vessels in 1 side and they said that she was at high risk for stroke and so she comes in today to discuss ways to mitigate stroke risk. She is not currently taking cholesterol well medication. She thinks the Lipitor made her gain weight. She is battling quitting smoking but its been difficult. She has tried Chantix twice which did not help. She has had a cough for the past week or so and whenever she coughs her head will really hurt. She has had a lot of nasal congestion and head pressure. No fevers however. Her  has had a similar ailment. Ms. Adrianna Ramirez  has  has a past medical history of Anxiety, Arthritis, Atypical ductal hyperplasia of right breast, Breast cancer ([address] Cancer ([address] Carpal tunnel syndrome, Chronic obstructive pulmonary disease ([address] Chronic pain, Claustrophobia, DDD (degenerative disc disease), lumbar, Ductal carcinoma in situ (DCIS) of right breast, GERD (gastroesophageal reflux disease), History of cigar smoking, History of peptic ulcer, History of URI (upper respiratory infection), History of vitamin D deficiency, Insomnia, Lateral epicondylitis  of elbow, Lumbago, Lumbar radiculopathy, Lumbar stenosis with neurogenic claudication, Memory difficulty, Mixed hyperlipidemia, Myopathy, Neuroma of foot, Nicotine vapor product user, Obesity (BMI 30.0-34.9), Other hyperalimentation, Papule, Poor historian, Pure hypercholesterolemia, Recurrent major depressive disorder, in full remission ([address] Seizure disorder ([address] Seizure disorder ([address] Smoker, and Tobacco abuse.     Ms. Adrianna Ramirez  has  has a past surgical history that includes orthopedic surgery (2016); cyst removal (Right); Carpal tunnel release (Bilateral); Colonoscopy (04/2010); mason biopsy breast stereotactic (Right, 11/28/2016); Bunionectomy (Left); Tubal ligation; Breast biopsy (Bilateral); Breast lumpectomy (Right, 1/6/2017); Breast biopsy (Right, 1/6/2017); heent; Breast lumpectomy (Right, 2/16/2017); Colonoscopy (3/13/2019); Colonoscopy (N/A, 3/13/2019); back surgery (6/15 latest); back surgery (11/2016); back surgery (08/2017); back surgery (12/2018); and arthroplasty (Right, 8/10/2022). Ms. Briana Waldrop   Current Outpatient Medications   Medication Sig Dispense Refill    albuterol sulfate HFA (PROVENTIL;VENTOLIN;PROAIR) 108 (90 Base) MCG/ACT inhaler Inhale 2 puffs into the lungs 4 times daily 18 g 11    rosuvastatin (CRESTOR) 10 MG tablet Take 1 tablet by mouth nightly 30 tablet 3    amoxicillin (AMOXIL) 500 MG capsule Take 1 capsule by mouth 2 times daily for 10 days 20 capsule 0    benzonatate (TESSALON PERLES) 100 MG capsule One po tid prn cough 20 capsule 0    nicotine (NICODERM CQ) 21 MG/24HR Place 1 patch onto the skin every 24 hours 30 patch 3    magnesium (MAGNESIUM-OXIDE) 250 MG TABS tablet Take 250 mg by mouth in the morning. Multiple Vitamins-Minerals (HAIR SKIN NAILS PO) Take by mouth daily      DULoxetine (CYMBALTA) 60 MG extended release capsule Take 60 mg by mouth 2 times daily      lacosamide (VIMPAT) 200 MG tablet Take 200 mg by mouth 2 times daily. traZODone (DESYREL) 100 MG tablet Take 100 mg by mouth nightly      zonisamide (ZONEGRAN) 100 MG capsule Take 300 mg by mouth nightly      aspirin 81 MG EC tablet Take 81 mg by mouth daily       No current facility-administered medications for this visit.        Ms. Fortino Lim History     Socioeconomic History    Marital status:      Spouse name: None    Number of children: None    Years of education: None    Highest education level: None   Tobacco Use    Smoking status: Every Day Packs/day: 0.25     Types: Cigarettes    Smokeless tobacco: Never    Tobacco comments:     Quit smoking: tobacco; recently cut down to 6 per day   Vaping Use    Vaping Use: Never used   Substance and Sexual Activity    Alcohol use: No    Drug use: No       Ms. Marleny Escalera   Family History   Problem Relation Age of Onset    Breast Cancer Sister 71    Stroke Brother     Diabetes Brother     Hypertension Father     Lung Disease Father         copd    Heart Disease Father     Hypertension Mother     Cancer Brother         Esophageal    Alcohol Abuse Father     Pacemaker Father             Ms. Marleny Escalera  has the following allergies: Allergies   Allergen Reactions    Oxycodone Itching     Pt states not allergic to    Pregabalin Swelling    Sulfa Antibiotics Itching and Nausea Only    Levofloxacin Rash       /82   Pulse (!) 106   Temp 97.5 °F (36.4 °C)   Resp 16   Ht 5' 4\" (1.626 m)   Wt 185 lb 6.4 oz (84.1 kg)   SpO2 96%   BMI 31.82 kg/m²     HEENT: Normocephalic, atraumatic, pupils equal and reactive to light. Tympanic membranes intact without erythema or edema. Oropharynx clear. Neck: Supple, no masses or thyromegaly. Lungs: clear to auscultation bilaterally. CV: regular rate and rhythm, without murmurs, rubs, or gallops  Abdomen: soft, nontender, nondistended, no masses. No CVAT tenderness. Ext: No lower extremity edema. Keyshawn Arellano was seen today for dizziness and results. Diagnoses and all orders for this visit:    Dizziness  -     CBC with Auto Differential; Future  -     Comprehensive Metabolic Panel; Future    Tobacco abuse  -     nicotine (NICODERM CQ) 21 MG/24HR; Place 1 patch onto the skin every 24 hours    Acute cough  -     albuterol sulfate HFA (PROVENTIL;VENTOLIN;PROAIR) 108 (90 Base) MCG/ACT inhaler; Inhale 2 puffs into the lungs 4 times daily    High cholesterol  -     rosuvastatin (CRESTOR) 10 MG tablet; Take 1 tablet by mouth nightly  -     Lipid Panel;  Future    Acute non-recurrent maxillary sinusitis  -     amoxicillin (AMOXIL) 500 MG capsule; Take 1 capsule by mouth 2 times daily for 10 days  -     benzonatate (TESSALON PERLES) 100 MG capsule; One po tid prn cough    Screening for malignant neoplasm of colon  -     [facility] - Leslee Marie MD, Gastroenterology    Mild intermittent asthma without complication  -     albuterol sulfate HFA (PROVENTIL;VENTOLIN;PROAIR) 108 (90 Base) MCG/ACT inhaler; Inhale 2 puffs into the lungs 4 times daily    Simple chronic bronchitis (HCC)  -     albuterol sulfate HFA (PROVENTIL;VENTOLIN;PROAIR) 108 (90 Base) MCG/ACT inhaler; Inhale 2 puffs into the lungs 4 times daily    She will take amoxicillin as well as Tessalon Perles for an acute sinusitis. She will try nicotine patch to quit smoking. We will get her on Crestor for her cholesterol. She will follow-up in 2 months to see how she is doing and we can do her wellness visit then as well. She is made aware the best way to stroke is to keep blood pressure normal, quit smoking, get her cholesterol down.   April Gonzalez MD

## 2023-02-08 ENCOUNTER — TELEPHONE (OUTPATIENT)
Dept: FAMILY MEDICINE CLINIC | Facility: CLINIC | Age: 63
End: 2023-02-08

## 2023-02-08 LAB
ALBUMIN SERPL-MCNC: 4 G/DL (ref 3.2–4.6)
ALBUMIN/GLOB SERPL: 1.3 (ref 0.4–1.6)
ALP SERPL-CCNC: 101 U/L (ref 50–136)
ALT SERPL-CCNC: 40 U/L (ref 12–65)
ANION GAP SERPL CALC-SCNC: 7 MMOL/L (ref 2–11)
AST SERPL-CCNC: 20 U/L (ref 15–37)
BILIRUB SERPL-MCNC: 0.2 MG/DL (ref 0.2–1.1)
BUN SERPL-MCNC: 18 MG/DL (ref 8–23)
CALCIUM SERPL-MCNC: 8.8 MG/DL (ref 8.3–10.4)
CHLORIDE SERPL-SCNC: 111 MMOL/L (ref 101–110)
CHOLEST SERPL-MCNC: 261 MG/DL
CO2 SERPL-SCNC: 21 MMOL/L (ref 21–32)
CREAT SERPL-MCNC: 0.9 MG/DL (ref 0.6–1)
GLOBULIN SER CALC-MCNC: 3.1 G/DL (ref 2.8–4.5)
GLUCOSE SERPL-MCNC: 77 MG/DL (ref 65–100)
HDLC SERPL-MCNC: 56 MG/DL (ref 40–60)
HDLC SERPL: 4.7
LDLC SERPL CALC-MCNC: 182.2 MG/DL
POTASSIUM SERPL-SCNC: 3.7 MMOL/L (ref 3.5–5.1)
PROT SERPL-MCNC: 7.1 G/DL (ref 6.3–8.2)
SODIUM SERPL-SCNC: 139 MMOL/L (ref 133–143)
TRIGL SERPL-MCNC: 114 MG/DL (ref 35–150)
VLDLC SERPL CALC-MCNC: 22.8 MG/DL (ref 6–23)

## 2023-02-08 RX ORDER — FLUCONAZOLE 150 MG/1
TABLET ORAL
Qty: 1 TABLET | Refills: 0 | Status: SHIPPED | OUTPATIENT
Start: 2023-02-08

## 2023-02-08 NOTE — TELEPHONE ENCOUNTER
Pt called stating that she usually gets an yeast infection after taking an antibiotic. Asking for some Diflucan to be sent into the pharmacy for her.

## 2023-02-08 NOTE — RESULT ENCOUNTER NOTE
Let patient know labs normal but chol is high. Get on the chol medication and see me in follow up in 6 months.

## 2023-03-20 ENCOUNTER — TELEPHONE (OUTPATIENT)
Dept: PHARMACY | Facility: CLINIC | Age: 63
End: 2023-03-20

## 2023-03-20 NOTE — TELEPHONE ENCOUNTER
[facility]: ADHERENCE REVIEW  Identified care gap per [employer]: fills at [facility] : Statin adherence    ASSESSMENT    [address] Records claims through  3/13/23  (Prior Year [place] = not reported; YTD Phill Rivas = FIRST FILL; Potential Fail Date: 23): Rosuvastatin 10 mg tab last filled on 23 for 30 day supply. Next refill due: 3/9/23    New therapy 23. Could not tolerate atorvastatin    Prescribed si tablet/capsule daily    Per Reconcile Dispense History:  rosuvastatin 10 mg tablet 2023 30 30 tablet Jose Reyes MD [id number] .. ROSUVASTATIN CALCIUM  10 MG TABS 2023 30 30 tablet MD Mushtaq Sweet [id number] -. .. Per [facility]: last picked up on 3/7/23. Billed through [facility]    Lab Results   Component Value Date    CHOL 261 (H) 2023    TRIG 114 2023    HDL 56 2023    LDLCALC 182.2 (H) 2023     ALT   Date Value Ref Range Status   2023 40 12 - 65 U/L Final     AST   Date Value Ref Range Status   2023 20 15 - 37 U/L Final     The 10-year ASCVD risk score (Wilfredo FAYE, et al., 2019) is: 8.3%    Values used to calculate the score:      Age: 58 years      Sex: Female      Is Non- : No      Diabetic: No      Tobacco smoker: Yes      Systolic Blood Pressure: 973 mmHg      Is BP treated: No      HDL Cholesterol: 56 MG/DL      Total Cholesterol: 261 MG/DL     PLAN  The following are interventions that have been identified:   None at this time - overdue refilling rosuvastatin per [facility] report however has since refilled, billed through "[employer]. No patient out reach planned at this time.     Karuna Keller, PharmD, [id number]   Department, toll free: [phone], option 2     For Pharmacy Admin Tracking Only    Program: [address] in place:  No  Gap Closed?: No   Time Spent (min): 10

## 2023-03-22 ENCOUNTER — HOSPITAL ENCOUNTER (OUTPATIENT)
Dept: CT IMAGING | Age: 63
Discharge: HOME | End: 2023-03-25
Payer: MEDICARE

## 2023-03-22 DIAGNOSIS — R91.1 LUNG NODULE: ICD-10-CM

## 2023-03-22 PROCEDURE — 71250 CT THORAX DX C-: CPT

## 2023-03-24 ENCOUNTER — TELEPHONE (OUTPATIENT)
Dept: FAMILY MEDICINE CLINIC | Facility: CLINIC | Age: 63
End: 2023-03-24

## 2023-03-24 RX ORDER — BENZONATATE 100 MG/1
CAPSULE ORAL
Qty: 20 CAPSULE | Refills: 0 | Status: SHIPPED | OUTPATIENT
Start: 2023-03-24

## 2023-03-24 NOTE — TELEPHONE ENCOUNTER
Pt called requesting refill on Tessalon Pearls for her cough. PT states that she had a CT yesterday and she was dx with emphysema Please send into [facility] [address].

## 2023-04-03 ENCOUNTER — HOSPITAL ENCOUNTER (OUTPATIENT)
Dept: LAB | Age: 63
Discharge: HOME | End: 2023-04-06
Payer: MEDICARE

## 2023-04-03 ENCOUNTER — OFFICE VISIT (OUTPATIENT)
Dept: ONCOLOGY | Age: 63
End: 2023-04-03
Payer: MEDICARE

## 2023-04-03 VITALS
WEIGHT: 185.3 LBS | RESPIRATION RATE: 19 BRPM | BODY MASS INDEX: 31.63 KG/M2 | DIASTOLIC BLOOD PRESSURE: 71 MMHG | HEIGHT: 64 IN | TEMPERATURE: 98 F | SYSTOLIC BLOOD PRESSURE: 127 MMHG | HEART RATE: 100 BPM | OXYGEN SATURATION: 96 %

## 2023-04-03 DIAGNOSIS — D50.9 IRON DEFICIENCY ANEMIA, UNSPECIFIED IRON DEFICIENCY ANEMIA TYPE: ICD-10-CM

## 2023-04-03 DIAGNOSIS — D64.9 ANEMIA, UNSPECIFIED TYPE: ICD-10-CM

## 2023-04-03 DIAGNOSIS — Z86.000 HISTORY OF DUCTAL CARCINOMA IN SITU (DCIS) OF BREAST: ICD-10-CM

## 2023-04-03 DIAGNOSIS — D05.11 DUCTAL CARCINOMA IN SITU (DCIS) OF RIGHT BREAST: ICD-10-CM

## 2023-04-03 DIAGNOSIS — R91.1 LUNG NODULE: ICD-10-CM

## 2023-04-03 DIAGNOSIS — D05.11 DUCTAL CARCINOMA IN SITU (DCIS) OF RIGHT BREAST: Primary | ICD-10-CM

## 2023-04-03 DIAGNOSIS — Z12.31 ENCOUNTER FOR SCREENING MAMMOGRAM FOR MALIGNANT NEOPLASM OF BREAST: Primary | ICD-10-CM

## 2023-04-03 LAB
ALBUMIN SERPL-MCNC: 3.9 G/DL (ref 3.2–4.6)
ALBUMIN/GLOB SERPL: 1.3 (ref 0.4–1.6)
ALP SERPL-CCNC: 116 U/L (ref 50–136)
ALT SERPL-CCNC: 26 U/L (ref 12–65)
ANION GAP SERPL CALC-SCNC: 5 MMOL/L (ref 2–11)
AST SERPL-CCNC: 18 U/L (ref 15–37)
BASOPHILS # BLD: 0.1 K/UL (ref 0–0.2)
BASOPHILS NFR BLD: 1 % (ref 0–2)
BILIRUB SERPL-MCNC: 0.5 MG/DL (ref 0.2–1.1)
BUN SERPL-MCNC: 18 MG/DL (ref 8–23)
CALCIUM SERPL-MCNC: 8.7 MG/DL (ref 8.3–10.4)
CHLORIDE SERPL-SCNC: 110 MMOL/L (ref 101–110)
CO2 SERPL-SCNC: 26 MMOL/L (ref 21–32)
CREAT SERPL-MCNC: 1 MG/DL (ref 0.6–1)
DIFFERENTIAL METHOD BLD: ABNORMAL
EOSINOPHIL # BLD: 0.2 K/UL (ref 0–0.8)
EOSINOPHIL NFR BLD: 2 % (ref 0.5–7.8)
ERYTHROCYTE [DISTWIDTH] IN BLOOD BY AUTOMATED COUNT: 12.5 % (ref 11.9–14.6)
FERRITIN SERPL-MCNC: 148 NG/ML (ref 8–388)
GLOBULIN SER CALC-MCNC: 2.9 G/DL (ref 2.8–4.5)
GLUCOSE SERPL-MCNC: 137 MG/DL (ref 65–100)
HCT VFR BLD AUTO: 44.5 % (ref 35.8–46.3)
HGB BLD-MCNC: 15.1 G/DL (ref 11.7–15.4)
IMM GRANULOCYTES # BLD AUTO: 0 K/UL (ref 0–0.5)
IMM GRANULOCYTES NFR BLD AUTO: 0 % (ref 0–5)
IRON SATN MFR SERPL: 37 %
IRON SERPL-MCNC: 121 UG/DL (ref 35–150)
LYMPHOCYTES # BLD: 3.3 K/UL (ref 0.5–4.6)
LYMPHOCYTES NFR BLD: 34 % (ref 13–44)
MCH RBC QN AUTO: 31 PG (ref 26.1–32.9)
MCHC RBC AUTO-ENTMCNC: 33.9 G/DL (ref 31.4–35)
MCV RBC AUTO: 91.4 FL (ref 82–102)
MONOCYTES # BLD: 0.6 K/UL (ref 0.1–1.3)
MONOCYTES NFR BLD: 7 % (ref 4–12)
NEUTS SEG # BLD: 5.4 K/UL (ref 1.7–8.2)
NEUTS SEG NFR BLD: 57 % (ref 43–78)
NRBC # BLD: 0 K/UL (ref 0–0.2)
PLATELET # BLD AUTO: 329 K/UL (ref 150–450)
PMV BLD AUTO: 8.1 FL (ref 9.4–12.3)
POTASSIUM SERPL-SCNC: 3.4 MMOL/L (ref 3.5–5.1)
PROT SERPL-MCNC: 6.8 G/DL (ref 6.3–8.2)
RBC # BLD AUTO: 4.87 M/UL (ref 4.05–5.2)
SODIUM SERPL-SCNC: 141 MMOL/L (ref 133–143)
TIBC SERPL-MCNC: 323 UG/DL (ref 250–450)
WBC # BLD AUTO: 9.5 K/UL (ref 4.3–11.1)

## 2023-04-03 PROCEDURE — 82728 ASSAY OF FERRITIN: CPT

## 2023-04-03 PROCEDURE — G8417 CALC BMI ABV UP PARAM F/U: HCPCS | Performed by: INTERNAL MEDICINE

## 2023-04-03 PROCEDURE — 83540 ASSAY OF IRON: CPT

## 2023-04-03 PROCEDURE — 85025 COMPLETE CBC W/AUTO DIFF WBC: CPT

## 2023-04-03 PROCEDURE — 80053 COMPREHEN METABOLIC PANEL: CPT

## 2023-04-03 PROCEDURE — 3017F COLORECTAL CA SCREEN DOC REV: CPT | Performed by: INTERNAL MEDICINE

## 2023-04-03 PROCEDURE — G8427 DOCREV CUR MEDS BY ELIG CLIN: HCPCS | Performed by: INTERNAL MEDICINE

## 2023-04-03 PROCEDURE — 36415 COLL VENOUS BLD VENIPUNCTURE: CPT

## 2023-04-03 PROCEDURE — 4004F PT TOBACCO SCREEN RCVD TLK: CPT | Performed by: INTERNAL MEDICINE

## 2023-04-03 PROCEDURE — 99213 OFFICE O/P EST LOW 20 MIN: CPT | Performed by: INTERNAL MEDICINE

## 2023-04-03 ASSESSMENT — PATIENT HEALTH QUESTIONNAIRE - PHQ9
SUM OF ALL RESPONSES TO PHQ QUESTIONS 1-9: 0
2. FEELING DOWN, DEPRESSED OR HOPELESS: 0
SUM OF ALL RESPONSES TO PHQ QUESTIONS 1-9: 0
SUM OF ALL RESPONSES TO PHQ9 QUESTIONS 1 & 2: 0
SUM OF ALL RESPONSES TO PHQ QUESTIONS 1-9: 0
SUM OF ALL RESPONSES TO PHQ QUESTIONS 1-9: 0
1. LITTLE INTEREST OR PLEASURE IN DOING THINGS: 0

## 2023-04-03 NOTE — PROGRESS NOTES
not show any residual disease. She has since been referred to us for further management. Patient is scheduled for back surgery on 3/23/17. In summary, pleasant middle aged female with RT sided intermediate grade, 1 cm DCIS s/p resection. Diagnosis, prognosis and therapy options/risk reduction techniques discussed. She would like yo proceed w endocrine therapy. 05/17: Doing well. Finishing XRT in a week. Tolerating Arimidex (post-menopausal, reports last menstrual period approx 5 years ago), some hot flashes. Lipids not fasting today. 11/17: S/p back repeat back surgery 08/17. Regular w Arimidex, recent b/l mammogram -ve, repeat in 1 year. Notes recurring LT medial chest cystic swelling w recurrent drainage over last year or so: will have surgery evaluate, doubt malignancy but may need to be lanced. Mildly elevated triglycerides, on cholesterol medication, copy given to f/u w primary care. Given extensive smoking history, will start annual low dose CT chest for lung malignancy screening. White count elevated over last year: initiate w/u    07/18: Reports doing well. Continues to have chronic back pain, and plans to get a stimulator placed. Reports regular w arimidex, tolerating well. Due for annual mammgram 11/18. Low dose screening chest CT 01/17 was -ve, repeat in 1 year. Leukocytosis/neutrophilia remains: w/u to date including peripheral blood smear review and flow cytometry -ve. Jak2 exon 12-14 and BCR ABL -ve: denies any recent infections, or steroid use. Options discussed, will proceed w BM biopsy. RTC in 4 months w labs, BM biopsy, b/l mammogram.       12/18: Leukocytosis resolved.  BM biopsy unremarkable (30-35% cellularity without any evidence of primary marrow disorder, cytogenetics normal), of note low to absent iron stores reported: denies any bleeding, reports last screening colonoscopy w EGD was approx 2 years ago and was unremarkable: advise to discuss w GI regarding low iron stores, also

## 2023-04-03 NOTE — LETTER
April 3, 2023      Anabelle Jimenez, [address] [address]      Patient: Myles Bello   MR Number: [id number]   YOB: 1960   Date of Visit: 4/3/2023       Dear Anabelle Jimenez: Thank you for referring Ryne Serra to me for evaluation/treatment. Below are the relevant portions of my assessment and plan of care. If you have questions, please do not hesitate to call me. I look forward to following Jacquie Ta along with you.     Sincerely,        Patel Ross MD

## 2023-04-03 NOTE — PATIENT INSTRUCTIONS
race factor using  the 2021 CKD-EPI equation. Careful clinical correlation is recommended, particularly when comparing to results calculated using previous equations. The CKD-EPI equation is less accurate in patients with extremes of muscle mass, extra-renal metabolism of creatinine, excessive creatine ingestion, or following therapy that affects renal tubular secretion.       Calcium 02/07/2023 8.8  8.3 - 10.4 MG/DL Final    Total Bilirubin 02/07/2023 0.2  0.2 - 1.1 MG/DL Final    ALT 02/07/2023 40  12 - 65 U/L Final    AST 02/07/2023 20  15 - 37 U/L Final    Alk Phosphatase 02/07/2023 101  50 - 136 U/L Final    Total Protein 02/07/2023 7.1  6.3 - 8.2 g/dL Final    Albumin 02/07/2023 4.0  3.2 - 4.6 g/dL Final    Globulin 02/07/2023 3.1  2.8 - 4.5 g/dL Final    Albumin/Globulin Ratio 02/07/2023 1.3  0.4 - 1.6   Final    WBC 02/07/2023 12.6 (H)  4.3 - 11.1 K/uL Final    RBC 02/07/2023 4.81  4.05 - 5.2 M/uL Final    Hemoglobin 02/07/2023 15.2  11.7 - 15.4 g/dL Final    Hematocrit 02/07/2023 45.4  35.8 - 46.3 % Final    MCV 02/07/2023 94.4  82 - 102 FL Final    MCH 02/07/2023 31.6  26.1 - 32.9 PG Final    MCHC 02/07/2023 33.5  31.4 - 35.0 g/dL Final    RDW 02/07/2023 12.8  11.9 - 14.6 % Final    Platelets 59/56/7928 340  150 - 450 K/uL Final    MPV 02/07/2023 8.4 (L)  9.4 - 12.3 FL Final    nRBC 02/07/2023 0.00  0.0 - 0.2 K/uL Final    **Note: Absolute NRBC parameter is now reported with Hemogram**    Differential Type 02/07/2023 AUTOMATED    Final    Seg Neutrophils 02/07/2023 57  43 - 78 % Final    Lymphocytes 02/07/2023 33  13 - 44 % Final    Monocytes 02/07/2023 8  4.0 - 12.0 % Final    Eosinophils % 02/07/2023 1  0.5 - 7.8 % Final    Basophils 02/07/2023 1  0.0 - 2.0 % Final    Immature Granulocytes 02/07/2023 0  0.0 - 5.0 % Final    Segs Absolute 02/07/2023 7.2  1.7 - 8.2 K/UL Final    Absolute Lymph # 02/07/2023 4.2  0.5 - 4.6 K/UL Final    Absolute Mono # 02/07/2023 0.9  0.1 - 1.3 K/UL Final    Absolute Eos #

## 2023-04-04 SDOH — ECONOMIC STABILITY: TRANSPORTATION INSECURITY
IN THE PAST 12 MONTHS, HAS LACK OF TRANSPORTATION KEPT YOU FROM MEETINGS, WORK, OR FROM GETTING THINGS NEEDED FOR DAILY LIVING?: PATIENT DECLINED

## 2023-04-04 SDOH — ECONOMIC STABILITY: FOOD INSECURITY: WITHIN THE PAST 12 MONTHS, THE FOOD YOU BOUGHT JUST DIDN'T LAST AND YOU DIDN'T HAVE MONEY TO GET MORE.: SOMETIMES TRUE

## 2023-04-04 SDOH — ECONOMIC STABILITY: INCOME INSECURITY: HOW HARD IS IT FOR YOU TO PAY FOR THE VERY BASICS LIKE FOOD, HOUSING, MEDICAL CARE, AND HEATING?: NOT VERY HARD

## 2023-04-04 SDOH — ECONOMIC STABILITY: HOUSING INSECURITY
IN THE LAST 12 MONTHS, WAS THERE A TIME WHEN YOU DID NOT HAVE A STEADY PLACE TO SLEEP OR SLEPT IN A SHELTER (INCLUDING NOW)?: PATIENT REFUSED

## 2023-04-04 SDOH — HEALTH STABILITY: PHYSICAL HEALTH: ON AVERAGE, HOW MANY DAYS PER WEEK DO YOU ENGAGE IN MODERATE TO STRENUOUS EXERCISE (LIKE A BRISK WALK)?: 1 DAY

## 2023-04-04 SDOH — ECONOMIC STABILITY: FOOD INSECURITY: WITHIN THE PAST 12 MONTHS, YOU WORRIED THAT YOUR FOOD WOULD RUN OUT BEFORE YOU GOT MONEY TO BUY MORE.: SOMETIMES TRUE

## 2023-04-04 ASSESSMENT — LIFESTYLE VARIABLES
HOW OFTEN DO YOU HAVE A DRINK CONTAINING ALCOHOL: MONTHLY OR LESS
HOW OFTEN DO YOU HAVE SIX OR MORE DRINKS ON ONE OCCASION: 1
HOW MANY STANDARD DRINKS CONTAINING ALCOHOL DO YOU HAVE ON A TYPICAL DAY: 1 OR 2
HOW OFTEN DO YOU HAVE A DRINK CONTAINING ALCOHOL: 2
HOW MANY STANDARD DRINKS CONTAINING ALCOHOL DO YOU HAVE ON A TYPICAL DAY: 1

## 2023-04-04 ASSESSMENT — PATIENT HEALTH QUESTIONNAIRE - PHQ9
SUM OF ALL RESPONSES TO PHQ9 QUESTIONS 1 & 2: 2
SUM OF ALL RESPONSES TO PHQ QUESTIONS 1-9: 2
SUM OF ALL RESPONSES TO PHQ QUESTIONS 1-9: 2
1. LITTLE INTEREST OR PLEASURE IN DOING THINGS: 1
2. FEELING DOWN, DEPRESSED OR HOPELESS: 1
SUM OF ALL RESPONSES TO PHQ QUESTIONS 1-9: 2
SUM OF ALL RESPONSES TO PHQ QUESTIONS 1-9: 2

## 2023-04-07 ENCOUNTER — OFFICE VISIT (OUTPATIENT)
Dept: FAMILY MEDICINE CLINIC | Facility: CLINIC | Age: 63
End: 2023-04-07
Payer: MEDICARE

## 2023-04-07 VITALS
BODY MASS INDEX: 31.24 KG/M2 | SYSTOLIC BLOOD PRESSURE: 130 MMHG | HEIGHT: 64 IN | OXYGEN SATURATION: 97 % | WEIGHT: 183 LBS | HEART RATE: 111 BPM | TEMPERATURE: 97.3 F | RESPIRATION RATE: 16 BRPM | DIASTOLIC BLOOD PRESSURE: 90 MMHG

## 2023-04-07 DIAGNOSIS — E78.00 HIGH CHOLESTEROL: ICD-10-CM

## 2023-04-07 DIAGNOSIS — G40.909 SEIZURE DISORDER (HCC): ICD-10-CM

## 2023-04-07 DIAGNOSIS — D05.11 DUCTAL CARCINOMA IN SITU (DCIS) OF RIGHT BREAST: ICD-10-CM

## 2023-04-07 DIAGNOSIS — G89.29 OTHER CHRONIC PAIN: ICD-10-CM

## 2023-04-07 DIAGNOSIS — Z12.4 SCREENING FOR MALIGNANT NEOPLASM OF CERVIX: ICD-10-CM

## 2023-04-07 DIAGNOSIS — R41.3 MEMORY DIFFICULTY: ICD-10-CM

## 2023-04-07 DIAGNOSIS — E78.00 PURE HYPERCHOLESTEROLEMIA: ICD-10-CM

## 2023-04-07 DIAGNOSIS — J45.20 MILD INTERMITTENT ASTHMA WITHOUT COMPLICATION: ICD-10-CM

## 2023-04-07 DIAGNOSIS — G40.909 NONINTRACTABLE EPILEPSY WITHOUT STATUS EPILEPTICUS, UNSPECIFIED EPILEPSY TYPE (HCC): ICD-10-CM

## 2023-04-07 DIAGNOSIS — F51.01 PRIMARY INSOMNIA: ICD-10-CM

## 2023-04-07 DIAGNOSIS — Z00.00 MEDICARE ANNUAL WELLNESS VISIT, SUBSEQUENT: Primary | ICD-10-CM

## 2023-04-07 DIAGNOSIS — G72.9 MYOPATHY: ICD-10-CM

## 2023-04-07 DIAGNOSIS — Z11.4 SCREENING FOR HIV (HUMAN IMMUNODEFICIENCY VIRUS): ICD-10-CM

## 2023-04-07 DIAGNOSIS — J41.0 SIMPLE CHRONIC BRONCHITIS (HCC): ICD-10-CM

## 2023-04-07 DIAGNOSIS — F33.42 RECURRENT MAJOR DEPRESSIVE DISORDER, IN FULL REMISSION (HCC): ICD-10-CM

## 2023-04-07 PROCEDURE — 3017F COLORECTAL CA SCREEN DOC REV: CPT | Performed by: FAMILY MEDICINE

## 2023-04-07 PROCEDURE — G0439 PPPS, SUBSEQ VISIT: HCPCS | Performed by: FAMILY MEDICINE

## 2023-04-07 RX ORDER — DULOXETIN HYDROCHLORIDE 60 MG/1
60 CAPSULE, DELAYED RELEASE ORAL 2 TIMES DAILY
Qty: 90 CAPSULE | Refills: 3 | Status: CANCELLED | OUTPATIENT
Start: 2023-04-07

## 2023-04-07 RX ORDER — TRAZODONE HYDROCHLORIDE 100 MG/1
100 TABLET ORAL NIGHTLY
Qty: 90 TABLET | Refills: 3 | Status: CANCELLED | OUTPATIENT
Start: 2023-04-07

## 2023-04-07 RX ORDER — ROSUVASTATIN CALCIUM 10 MG/1
10 TABLET, COATED ORAL NIGHTLY
Qty: 90 TABLET | Refills: 3 | Status: SHIPPED | OUTPATIENT
Start: 2023-04-07

## 2023-04-07 RX ORDER — BENZONATATE 100 MG/1
CAPSULE ORAL
Qty: 20 CAPSULE | Refills: 0 | Status: SHIPPED | OUTPATIENT
Start: 2023-04-07

## 2023-04-07 NOTE — PROGRESS NOTES
Medicare Annual Wellness Visit    Laila Pastor is here for Medicare AWV    Assessment & Plan   Medicare annual wellness visit, subsequent  High cholesterol  -     rosuvastatin (CRESTOR) 10 MG tablet; Take 1 tablet by mouth nightly, Disp-90 tablet, R-3Normal  -     Lipid Panel; Future  Pure hypercholesterolemia  Recurrent major depressive disorder, in full remission ([facility]  Nonintractable epilepsy without status epilepticus, unspecified epilepsy type ([facility]  Simple chronic bronchitis (HCC)  Mild intermittent asthma without complication  Seizure disorder ([facility]  Ductal carcinoma in situ (DCIS) of right breast  Primary insomnia  Myopathy  Memory difficulty  Other chronic pain  Screening for HIV (human immunodeficiency virus)  -     HIV 1/2 Ag/Ab, 4TH Generation,W Rflx Confirm; Future  Screening for malignant neoplasm of cervix  -     IGP, Aptima HPV; Future      Recommendations for Preventive Services Due: see orders and patient instructions/AVS.  Recommended screening schedule for the next 5-10 years is provided to the patient in written form: see Patient Instructions/AVS.     Return in about 1 year (around 4/7/2024). Subjective   The following acute and/or chronic problems were also addressed today:  Patient has had a history of breast cancer and is now 5 years out and off Arimidex. She does have follow-up with oncology regularly. She does see neurology regularly about his seizure disorder. She also sees psychiatry about depression. She also has chronic back pain and is just living with that at this time. She is on disability from it. She does have memory difficulties and she was told that it may be due to her seizure disorder. She is often unsteady on her feet and so is very careful about walking. Her psychiatrist refilled her Cymbalta and her trazodone. Her neurologist refills for Vimpat.   She did go for a study of the arteries in her neck and head and they did tell her there was an irregularity

## 2023-04-07 NOTE — PATIENT INSTRUCTIONS
or 10,000 steps per day on a pedometer . Order or download the FREE \"Exercise & Physical Activity: Your Everyday Guide\" from [employer]. Call 6-673.162.5437 or search [employer] online. You need 2272-6990 mg of calcium and 2212-5264 IU of vitamin D per day. It is possible to meet your calcium requirement with diet alone, but a vitamin D supplement is usually necessary to meet this goal.  When exposed to the sun, use a sunscreen that protects against both UVA and UVB radiation with an SPF of 30 or greater. Reapply every 2 to 3 hours or after sweating, drying off with a towel, or swimming. Always wear a seat belt when traveling in a car. Always wear a helmet when riding a bicycle or motorcycle.

## 2023-05-01 ENCOUNTER — OFFICE VISIT (OUTPATIENT)
Dept: FAMILY MEDICINE CLINIC | Facility: CLINIC | Age: 63
End: 2023-05-01
Payer: MEDICARE

## 2023-05-01 VITALS
SYSTOLIC BLOOD PRESSURE: 145 MMHG | DIASTOLIC BLOOD PRESSURE: 76 MMHG | HEART RATE: 111 BPM | WEIGHT: 194 LBS | OXYGEN SATURATION: 98 % | TEMPERATURE: 97.4 F | RESPIRATION RATE: 16 BRPM | BODY MASS INDEX: 33.12 KG/M2 | HEIGHT: 64 IN

## 2023-05-01 DIAGNOSIS — D22.9 ATYPICAL NEVUS: ICD-10-CM

## 2023-05-01 DIAGNOSIS — Z72.0 TOBACCO ABUSE: Primary | ICD-10-CM

## 2023-05-01 DIAGNOSIS — F41.9 ANXIETY: ICD-10-CM

## 2023-05-01 DIAGNOSIS — F33.42 RECURRENT MAJOR DEPRESSIVE DISORDER, IN FULL REMISSION (HCC): ICD-10-CM

## 2023-05-01 PROCEDURE — 3017F COLORECTAL CA SCREEN DOC REV: CPT | Performed by: FAMILY MEDICINE

## 2023-05-01 PROCEDURE — 99214 OFFICE O/P EST MOD 30 MIN: CPT | Performed by: FAMILY MEDICINE

## 2023-05-01 PROCEDURE — G8427 DOCREV CUR MEDS BY ELIG CLIN: HCPCS | Performed by: FAMILY MEDICINE

## 2023-05-01 PROCEDURE — 4004F PT TOBACCO SCREEN RCVD TLK: CPT | Performed by: FAMILY MEDICINE

## 2023-05-01 PROCEDURE — G8417 CALC BMI ABV UP PARAM F/U: HCPCS | Performed by: FAMILY MEDICINE

## 2023-05-01 RX ORDER — VARENICLINE TARTRATE 1 MG/1
TABLET, FILM COATED ORAL
Qty: 60 TABLET | Refills: 5 | Status: SHIPPED | OUTPATIENT
Start: 2023-05-01

## 2023-05-01 ASSESSMENT — PATIENT HEALTH QUESTIONNAIRE - PHQ9
SUM OF ALL RESPONSES TO PHQ QUESTIONS 1-9: 19
SUM OF ALL RESPONSES TO PHQ QUESTIONS 1-9: 19
4. FEELING TIRED OR HAVING LITTLE ENERGY: 3
1. LITTLE INTEREST OR PLEASURE IN DOING THINGS: 2
SUM OF ALL RESPONSES TO PHQ QUESTIONS 1-9: 19
SUM OF ALL RESPONSES TO PHQ9 QUESTIONS 1 & 2: 4
2. FEELING DOWN, DEPRESSED OR HOPELESS: 2
3. TROUBLE FALLING OR STAYING ASLEEP: 3
10. IF YOU CHECKED OFF ANY PROBLEMS, HOW DIFFICULT HAVE THESE PROBLEMS MADE IT FOR YOU TO DO YOUR WORK, TAKE CARE OF THINGS AT HOME, OR GET ALONG WITH OTHER PEOPLE: 2
6. FEELING BAD ABOUT YOURSELF - OR THAT YOU ARE A FAILURE OR HAVE LET YOURSELF OR YOUR FAMILY DOWN: 2
5. POOR APPETITE OR OVEREATING: 3
7. TROUBLE CONCENTRATING ON THINGS, SUCH AS READING THE NEWSPAPER OR WATCHING TELEVISION: 2
9. THOUGHTS THAT YOU WOULD BE BETTER OFF DEAD, OR OF HURTING YOURSELF: 0
8. MOVING OR SPEAKING SO SLOWLY THAT OTHER PEOPLE COULD HAVE NOTICED. OR THE OPPOSITE, BEING SO FIGETY OR RESTLESS THAT YOU HAVE BEEN MOVING AROUND A LOT MORE THAN USUAL: 2
SUM OF ALL RESPONSES TO PHQ QUESTIONS 1-9: 19

## 2023-05-01 NOTE — PROGRESS NOTES
mouth daily      Multiple Vitamins-Minerals (HAIR SKIN NAILS PO) Take by mouth daily      DULoxetine (CYMBALTA) 60 MG extended release capsule Take 1 capsule by mouth 2 times daily      lacosamide (VIMPAT) 200 MG tablet Take 1 tablet by mouth 2 times daily. traZODone (DESYREL) 100 MG tablet Take 1 tablet by mouth nightly      zonisamide (ZONEGRAN) 100 MG capsule Take 3 capsules by mouth nightly      aspirin 81 MG EC tablet Take 1 tablet by mouth daily       No current facility-administered medications for this visit. Ms. Puneet Conklin History     Socioeconomic History    Marital status:      Spouse name: None    Number of children: None    Years of education: None    Highest education level: None   Tobacco Use    Smoking status: Every Day     Packs/day: 0.25     Types: Cigarettes    Smokeless tobacco: Never    Tobacco comments:     Quit smoking: tobacco; recently cut down to 6 per day   Vaping Use    Vaping Use: Never used   Substance and Sexual Activity    Alcohol use: No    Drug use: No     Social Determinants of Health     Financial Resource Strain: Low Risk     Difficulty of Paying Living Expenses: Not very hard   Food Insecurity: Food Insecurity Present    Worried About Running Out of Food in the Last Year: Sometimes true    Ran Out of Food in the Last Year: Sometimes true   Transportation Needs: Unknown    Lack of Transportation (Non-Medical): Patient refused   Physical Activity: Unknown    Days of Exercise per Week: 1 day   Housing Stability: Unknown    Unstable Housing in the Last Year: Patient refused       Ms. Delong   Family History   Problem Relation Age of Onset    Breast Cancer Sister 71    Stroke Brother     Diabetes Brother     Hypertension Father     Lung Disease Father         copd    Heart Disease Father     Hypertension Mother     Cancer Brother         Esophageal    Alcohol Abuse Father     Pacemaker Father             Ms. Phil Morris  has the following allergies:    Allergies

## 2023-07-12 ENCOUNTER — OFFICE VISIT (OUTPATIENT)
Dept: FAMILY MEDICINE CLINIC | Facility: CLINIC | Age: 63
End: 2023-07-12
Payer: MEDICARE

## 2023-07-12 VITALS
OXYGEN SATURATION: 99 % | WEIGHT: 203 LBS | RESPIRATION RATE: 16 BRPM | HEIGHT: 64 IN | SYSTOLIC BLOOD PRESSURE: 128 MMHG | BODY MASS INDEX: 34.66 KG/M2 | HEART RATE: 95 BPM | TEMPERATURE: 97.5 F | DIASTOLIC BLOOD PRESSURE: 82 MMHG

## 2023-07-12 DIAGNOSIS — M25.511 CHRONIC PAIN OF BOTH SHOULDERS: ICD-10-CM

## 2023-07-12 DIAGNOSIS — R73.9 ELEVATED BLOOD SUGAR: ICD-10-CM

## 2023-07-12 DIAGNOSIS — M54.50 CHRONIC BILATERAL LOW BACK PAIN WITHOUT SCIATICA: ICD-10-CM

## 2023-07-12 DIAGNOSIS — G89.29 CHRONIC BILATERAL LOW BACK PAIN WITHOUT SCIATICA: ICD-10-CM

## 2023-07-12 DIAGNOSIS — R63.5 WEIGHT GAIN: ICD-10-CM

## 2023-07-12 DIAGNOSIS — M25.551 RIGHT HIP PAIN: ICD-10-CM

## 2023-07-12 DIAGNOSIS — E03.9 ACQUIRED HYPOTHYROIDISM: ICD-10-CM

## 2023-07-12 DIAGNOSIS — R63.5 WEIGHT GAIN: Primary | ICD-10-CM

## 2023-07-12 DIAGNOSIS — G89.29 OTHER CHRONIC PAIN: ICD-10-CM

## 2023-07-12 DIAGNOSIS — G89.29 CHRONIC PAIN OF BOTH SHOULDERS: ICD-10-CM

## 2023-07-12 DIAGNOSIS — M25.512 CHRONIC PAIN OF BOTH SHOULDERS: ICD-10-CM

## 2023-07-12 PROCEDURE — 1036F TOBACCO NON-USER: CPT | Performed by: FAMILY MEDICINE

## 2023-07-12 PROCEDURE — G8417 CALC BMI ABV UP PARAM F/U: HCPCS | Performed by: FAMILY MEDICINE

## 2023-07-12 PROCEDURE — 99214 OFFICE O/P EST MOD 30 MIN: CPT | Performed by: FAMILY MEDICINE

## 2023-07-12 PROCEDURE — 3017F COLORECTAL CA SCREEN DOC REV: CPT | Performed by: FAMILY MEDICINE

## 2023-07-12 PROCEDURE — G8427 DOCREV CUR MEDS BY ELIG CLIN: HCPCS | Performed by: FAMILY MEDICINE

## 2023-07-13 LAB
ANION GAP SERPL CALC-SCNC: 8 MMOL/L (ref 2–11)
BUN SERPL-MCNC: 22 MG/DL (ref 8–23)
CALCIUM SERPL-MCNC: 9 MG/DL (ref 8.3–10.4)
CHLORIDE SERPL-SCNC: 112 MMOL/L (ref 101–110)
CO2 SERPL-SCNC: 22 MMOL/L (ref 21–32)
CREAT SERPL-MCNC: 1.1 MG/DL (ref 0.6–1)
EST. AVERAGE GLUCOSE BLD GHB EST-MCNC: 114 MG/DL
GLUCOSE SERPL-MCNC: 77 MG/DL (ref 65–100)
HBA1C MFR BLD: 5.6 % (ref 4.8–5.6)
POTASSIUM SERPL-SCNC: 4.1 MMOL/L (ref 3.5–5.1)
SODIUM SERPL-SCNC: 142 MMOL/L (ref 133–143)
TSH, 3RD GENERATION: 85.2 UIU/ML (ref 0.36–3.74)

## 2023-07-14 RX ORDER — LEVOTHYROXINE SODIUM 0.05 MG/1
50 TABLET ORAL DAILY
Qty: 30 TABLET | Refills: 1 | Status: SHIPPED | OUTPATIENT
Start: 2023-07-14

## 2023-07-14 NOTE — RESULT ENCOUNTER NOTE
Let patient know labs normal/fine except it appears that her kidney function is slightly down because she may be dehydrated. She does not have diabetes. She does however appear to have a thyroid that is not functioning. Would like to begin her on thyroid medication and would like to see her in 1 month. We will send this in for her. Be sure she takes this medication on an empty stomach and waits an hour before she eats or takes other medicine.

## 2023-07-18 ENCOUNTER — HOSPITAL ENCOUNTER (OUTPATIENT)
Dept: PHYSICAL THERAPY | Age: 63
Setting detail: RECURRING SERIES
Discharge: HOME | End: 2023-07-21
Payer: MEDICARE

## 2023-07-18 PROCEDURE — 97110 THERAPEUTIC EXERCISES: CPT

## 2023-07-18 PROCEDURE — 97161 PT EVAL LOW COMPLEX 20 MIN: CPT

## 2023-07-18 ASSESSMENT — PAIN SCALES - GENERAL: PAINLEVEL_OUTOF10: 6

## 2023-07-18 NOTE — PROGRESS NOTES
Lorena Gunn  : 1960  Primary: [address] [facility] Medicare (Medicare Managed)  Secondary: [address] [address]  Phone: [phone]  Fax: [phone] Plan Frequency: 2 visits per week for 6 weeks  Plan of Care/Certification Expiration Date: 23      >PT Visit Info:  Plan Frequency: 2 visits per week for 6 weeks  Plan of Care/Certification Expiration Date: 23      Visit Count:  1    OUTPATIENT PHYSICAL THERAPY:OP NOTE TYPE: OP Note Type: Treatment Note 2023       Episode  }Appt Desk             Treatment Diagnosis:  Pain in Left Shoulder (M25.512)  Stiffness of Left Shoulder, Not elsewhere classified (M25.612)  Pain in Right Shoulder (M25.511)  Stiffness of Right Shoulder, Not elsewhere classified (M25.611)  Abnormal posture (R29.3)  Medical/Referring Diagnosis:  Chronic pain of both shoulders [M25.511, G89.29, M25.512]  Referring Physician:  Antoinette Key MD MD Orders:  PT Eval and Treat   Date of Onset:  Onset Date:  (3 months ago)     Allergies:   Sulfa antibiotics  Restrictions/Precautions:  Restrictions/Precautions: None  No data recorded   Interventions Planned (Treatment may consist of any combination of the following):    Current Treatment Recommendations: Strengthening; ROM; Therapeutic activities; Modalities; Dry needling; Patient/Caregiver education & training; Home exercise program; Manual; Neuromuscular re-education     >Subjective Comments:  Patient reports that she has some shoulder pain in both shoulders, but the R is much worse. >Initial:     6/10>Post Session:    Not rated    /10  Medications Last Reviewed:  2023  Updated Objective Findings:  See evaluation note from today  Treatment     THERAPEUTIC EXERCISE: (25 minutes):    Exercises per grid below to improve mobility and strength.   Required moderate visual and verbal cues to promote proper body

## 2023-07-18 NOTE — THERAPY EVALUATION
Jenny Lal  : 1960  Primary: [address] [facility] Medicare (Medicare Managed)  Secondary: [address] [facility] @ [address] [facility] [address]  Phone: [phone]  Fax: [phone] Plan Frequency: 2 visits per week for 6 weeks    Plan of Care/Certification Expiration Date: 23      PT Visit Info:  Plan Frequency: 2 visits per week for 6 weeks  Plan of Care/Certification Expiration Date: 23      Visit Count:  1                OUTPATIENT PHYSICAL THERAPY:             OP NOTE TYPE: Initial Assessment 2023               Episode (Chronic bilateral shoulder pain) Appt Desk         Treatment Diagnosis:  Pain in Left Shoulder (M25.512)  Stiffness of Left Shoulder, Not elsewhere classified (M25.612)  Pain in Right Shoulder (M25.511)  Stiffness of Right Shoulder, Not elsewhere classified (M25.611)  Abnormal posture (R29.3)  Medical/Referring Diagnosis:  Chronic pain of both shoulders [M25.511, G89.29, M25.512]  Referring Physician:  Malorie Carvalho MD MD Orders:  PT Eval and Treat   Return MD Appt:  TBD  Date of Onset:  Onset Date:  (3 months ago)      Allergies:  Sulfa antibiotics  Restrictions/Precautions:    Restrictions/Precautions: None        Medications Last Reviewed:  2023     SUBJECTIVE   History of Injury/Illness (Reason for Referral):  Reports that she takes medicine to help her sleep, but shoulder pain wakes her up after 2-3 hours of sleep. Reports that she has been having shoulder pain for a couple of months. R shoulder is more painful than the left. Has not had any imaging on her shoulders. Elodia Peaks twice last month and has fallen once this month. Has a history of carpal tunnel surgery in both hands. Shoulder bothers her when she \"moves the wrong way\".    Patient Stated Goal(s):  Improve sleep and be able to use R shoulder with less pain  Initial:     6/10 Post Session:   Not rated   /10  Past

## 2023-07-25 ENCOUNTER — HOSPITAL ENCOUNTER (OUTPATIENT)
Dept: PHYSICAL THERAPY | Age: 63
Setting detail: RECURRING SERIES
Discharge: HOME | End: 2023-07-28
Payer: MEDICARE

## 2023-07-25 PROCEDURE — 97140 MANUAL THERAPY 1/> REGIONS: CPT

## 2023-07-25 PROCEDURE — 97110 THERAPEUTIC EXERCISES: CPT

## 2023-07-25 ASSESSMENT — PAIN SCALES - GENERAL: PAINLEVEL_OUTOF10: 7

## 2023-07-25 NOTE — PROGRESS NOTES
Marvin Doshi  : 1960  Primary: [address] [facility] Medicare (Medicare Managed)  Secondary: [address] @ [address] [place] [address]  Phone: [phone]  Fax: [phone] Plan Frequency: 2 visits per week for 6 weeks  Plan of Care/Certification Expiration Date: 23      >PT Visit Info:  Plan Frequency: 2 visits per week for 6 weeks  Plan of Care/Certification Expiration Date: 23      Visit Count:  2    OUTPATIENT PHYSICAL THERAPY:OP NOTE TYPE: OP Note Type: Treatment Note 2023       Episode  }Appt Desk             Treatment Diagnosis:  Pain in Left Shoulder (M25.512)  Stiffness of Left Shoulder, Not elsewhere classified (M25.612)  Pain in Right Shoulder (M25.511)  Stiffness of Right Shoulder, Not elsewhere classified (M25.611)  Abnormal posture (R29.3)  Medical/Referring Diagnosis:  Chronic pain of both shoulders [M25.511, G89.29, M25.512]  Referring Physician:  Reggie Wallace MD MD Orders:  PT Eval and Treat   Date of Onset:  Onset Date:  (3 months ago)     Allergies:   Sulfa antibiotics  Restrictions/Precautions:  Restrictions/Precautions: None  No data recorded   Interventions Planned (Treatment may consist of any combination of the following):    Current Treatment Recommendations: Strengthening; ROM; Therapeutic activities; Modalities; Dry needling; Patient/Caregiver education & training; Home exercise program; Manual; Neuromuscular re-education     >Subjective Comments:  Pt.'s chief complaint was right shoulder pain. >Initial:     7/10>Post Session:       4/10  Medications Last Reviewed:  2023  Updated Objective Findings:   Pt. Guarded initially with right shoulder. Treatment     THERAPEUTIC EXERCISE: (40 minutes):    Exercises per grid below to improve mobility and strength.   Required moderate visual and verbal cues to promote proper body posture and promote proper body

## 2023-07-27 ENCOUNTER — HOSPITAL ENCOUNTER (OUTPATIENT)
Dept: PHYSICAL THERAPY | Age: 63
Setting detail: RECURRING SERIES
Discharge: HOME | End: 2023-07-30
Payer: MEDICARE

## 2023-07-27 PROCEDURE — 97110 THERAPEUTIC EXERCISES: CPT

## 2023-07-27 ASSESSMENT — PAIN SCALES - GENERAL: PAINLEVEL_OUTOF10: 7

## 2023-07-27 NOTE — PROGRESS NOTES
Kike Yin  : 1960  Primary: [address] [facility] Medicare (Medicare Managed)  Secondary: [address] @ [address] [facility] [address]  Phone: [phone]  Fax: [phone] Plan Frequency: 2 visits per week for 6 weeks  Plan of Care/Certification Expiration Date: 23      >PT Visit Info:  Plan Frequency: 2 visits per week for 6 weeks  Plan of Care/Certification Expiration Date: 23      Visit Count:  3    OUTPATIENT PHYSICAL THERAPY:OP NOTE TYPE: OP Note Type: Treatment Note 2023       Episode  }Appt Desk             Treatment Diagnosis:  Pain in Left Shoulder (M25.512)  Stiffness of Left Shoulder, Not elsewhere classified (M25.612)  Pain in Right Shoulder (M25.511)  Stiffness of Right Shoulder, Not elsewhere classified (M25.611)  Abnormal posture (R29.3)  Medical/Referring Diagnosis:  Chronic pain of both shoulders [M25.511, G89.29, M25.512]  Referring Physician:  Lynn Sheriff MD MD Orders:  PT Eval and Treat   Date of Onset:  Onset Date:  (3 months ago)     Allergies:   Sulfa antibiotics  Restrictions/Precautions:  Restrictions/Precautions: None  No data recorded   Interventions Planned (Treatment may consist of any combination of the following):    Current Treatment Recommendations: Strengthening; ROM; Therapeutic activities; Modalities; Dry needling; Patient/Caregiver education & training; Home exercise program; Manual; Neuromuscular re-education     >Subjective Comments:  Shoulders are sore today upon arrival, L shoulder is hurting more today than it has been. >Initial:     7/10>Post Session:       5/10  Medications Last Reviewed:  2023  Updated Objective Findings:   Limited by back spasms initially. Did well with exercises in sitting. Treatment     THERAPEUTIC EXERCISE: (54 minutes):    Exercises per grid below to improve mobility and strength.   Required moderate visual and

## 2023-07-31 ENCOUNTER — APPOINTMENT (OUTPATIENT)
Dept: PHYSICAL THERAPY | Age: 63
End: 2023-07-31
Payer: MEDICARE

## 2023-08-01 ENCOUNTER — HOSPITAL ENCOUNTER (OUTPATIENT)
Dept: PHYSICAL THERAPY | Age: 63
Setting detail: RECURRING SERIES
Discharge: HOME | End: 2023-08-04
Payer: MEDICARE

## 2023-08-01 PROCEDURE — 97140 MANUAL THERAPY 1/> REGIONS: CPT

## 2023-08-01 PROCEDURE — 97110 THERAPEUTIC EXERCISES: CPT

## 2023-08-01 NOTE — PROGRESS NOTES
visual and verbal cues to promote proper body posture and promote proper body mechanics. Progressed resistance, range, and repetitions as indicated. Date  8/1/23 Date:  7/25/23 Date:  7/27/23   Activity/Exercise  Parameters Parameters   Bilateral external rotations Green  3 x 10-12 B 2x10 seated Yellow  3 x 10 Bilateral, seated   Chin tucks Seated in chair, ball behind head  2 x 10, 5 second hold 2x10, 5 sec hold each     Scapular retractions Green  3 x 10, seated B    Black  2 x 10, seated B 2x10 reps seated  Green  3 x 10    PROM To each shoulder in supine for flexion, abduction, IR and ER x 10 minutes Pt. Received PROM to right shoulder in all planes to tolerance as per MD guidelines To each shoulder in supine for flexion, abduction, IR and ER x 15 minutes   UBE   Level 2 x 3 mins fwd & 3 mins bkwd     Shoulder rows & low rows Green  1 x 15 B  Blue  2 x 10 B Red band 2x10 reps 5 sec hold each Seated, green  3 x 10 B (low rows)   Wand exercises Supine x 15 reps for flexion bilaterally     Supine x 20 reps each flexion, IR & ER & abduction, Press ups with BUE's locked into extension  Supine x 20 reps each flexion, abduction, IR and ER    Pulleys Flexion x 20  Scaption x 20 with cues for hand placement on both  Flexion x 15  Scaption x 15   Shoulder flexion 1# 3 x 10 R, seated     Shoulder scaption 1# 3 x 10 R     External rotations Red  Single band 3 x 10 R                     Time spent with patient reviewing proper muscle recruitment and technique with exercises. MANUAL THERAPY: (11 minutes):   Joint mobilization and Soft tissue mobilization was utilized and necessary because of the patient's restricted joint motion, loss of articular motion, and restricted motion of soft tissue.    Soft tissue mobilizations to R infraspinatus and R posterior shoulder to reduce tightness and pain    MODALITIES: (0 minutes):        None today      HEP: As above; handouts given to patient for all

## 2023-08-03 ENCOUNTER — HOSPITAL ENCOUNTER (OUTPATIENT)
Dept: PHYSICAL THERAPY | Age: 63
Setting detail: RECURRING SERIES
Discharge: HOME | End: 2023-08-06
Payer: MEDICARE

## 2023-08-03 PROCEDURE — 97110 THERAPEUTIC EXERCISES: CPT

## 2023-08-03 PROCEDURE — 97140 MANUAL THERAPY 1/> REGIONS: CPT

## 2023-08-03 NOTE — PROGRESS NOTES
Luis Hutchison  : 1960  Primary: [address] [facility] Medicare (Medicare Managed)  Secondary: [address] @ [address] [facility] [address]  Phone: [phone]  Fax: [phone] Plan Frequency: 2 visits per week for 6 weeks  Plan of Care/Certification Expiration Date: 23      >PT Visit Info:  Plan Frequency: 2 visits per week for 6 weeks  Plan of Care/Certification Expiration Date: 23      Visit Count:  5    OUTPATIENT PHYSICAL THERAPY:OP NOTE TYPE: OP Note Type: Treatment Note 8/3/2023       Episode  }Appt Desk             Treatment Diagnosis:  Pain in Left Shoulder (M25.512)  Stiffness of Left Shoulder, Not elsewhere classified (M25.612)  Pain in Right Shoulder (M25.511)  Stiffness of Right Shoulder, Not elsewhere classified (M25.611)  Abnormal posture (R29.3)  Medical/Referring Diagnosis:  Chronic pain of both shoulders [M25.511, G89.29, M25.512]  Referring Physician:  Severo Angst, MD MD Orders:  PT Eval and Treat   Date of Onset:  Onset Date:  (3 months ago)     Allergies:   Sulfa antibiotics  Restrictions/Precautions:  Restrictions/Precautions: None  No data recorded   Interventions Planned (Treatment may consist of any combination of the following):    Current Treatment Recommendations: Strengthening; ROM; Therapeutic activities; Modalities; Dry needling; Patient/Caregiver education & training; Home exercise program; Manual; Neuromuscular re-education     >Subjective Comments: Pt. reported her lower back was \"stiff\" today prior to therapy. Pt. was asked how her R shoulder felt, she responded \"tight\". >Initial:      5/10>Post Session:        2/10  Medications Last Reviewed:  8/3/2023  Updated Objective Findings:   Pt. had trouble laying supine today due to her LB having spasms. Treatment     THERAPEUTIC EXERCISE: (45 minutes):    Exercises per grid below to improve mobility and strength.

## 2023-08-05 NOTE — PROGRESS NOTES
Gerson Ellis  : 1960  Primary: [address] [facility] Medicare (Medicare Managed)  Secondary: [address] @ [address] [facility] [address]  Phone: [phone]  Fax: [phone] Plan Frequency: 2 visits per week for 6 weeks  Plan of Care/Certification Expiration Date: 23      >PT Visit Info  Plan Frequency: 2 visits per week for 6 weeks  Plan of Care/Certification Expiration Date: 23      Visit Count:  6    OUTPATIENT PHYSICAL THERAPY:OP NOTE TYPE: OP Note Type: Treatment Note 2023       Episode  }Appt Desk             Treatment Diagnosis:  Pain in Left Shoulder (M25.512)  Stiffness of Left Shoulder, Not elsewhere classified (M25.612)  Pain in Right Shoulder (M25.511)  Stiffness of Right Shoulder, Not elsewhere classified (M25.611)  Abnormal posture (R29.3)  Medical/Referring Diagnosis:  Chronic pain of both shoulders [M25.511, G89.29, M25.512]  Referring Physician:  Amy Us MD MD Orders:  PT Eval and Treat   Date of Onset:  Onset Date:  (3 months ago)     Allergies:   Sulfa antibiotics  Restrictions/Precautions:  Restrictions/Precautions: None  No data recorded   Interventions Planned (Treatment may consist of any combination of the following):    Current Treatment Recommendations: Strengthening; ROM; Therapeutic activities; Modalities; Dry needling; Patient/Caregiver education & training; Home exercise program; Manual; Neuromuscular re-education     >Subjective Comments: Pt. reported she \"over did it\" Saturday working in the yard. Pt. also stated she saw her PCP this morning and got blood work done for her thyroid. >Initial:      5-6/10>Post Session:        2/10  Medications Last Reviewed:  2023  Updated Objective Findings:   Pt. presented with \"upper back pain\" while using the UBE.   Treatment     THERAPEUTIC EXERCISE: (44 minutes):    Exercises per grid below to improve mobility

## 2023-08-06 ASSESSMENT — PATIENT HEALTH QUESTIONNAIRE - PHQ9
SUM OF ALL RESPONSES TO PHQ QUESTIONS 1-9: 16
5. POOR APPETITE OR OVEREATING: MORE THAN HALF THE DAYS
4. FEELING TIRED OR HAVING LITTLE ENERGY: 2
SUM OF ALL RESPONSES TO PHQ QUESTIONS 1-9: 16
3. TROUBLE FALLING OR STAYING ASLEEP: MORE THAN HALF THE DAYS
2. FEELING DOWN, DEPRESSED OR HOPELESS: 2
9. THOUGHTS THAT YOU WOULD BE BETTER OFF DEAD, OR OF HURTING YOURSELF: NOT AT ALL
10. IF YOU CHECKED OFF ANY PROBLEMS, HOW DIFFICULT HAVE THESE PROBLEMS MADE IT FOR YOU TO DO YOUR WORK, TAKE CARE OF THINGS AT HOME, OR GET ALONG WITH OTHER PEOPLE: 2
1. LITTLE INTEREST OR PLEASURE IN DOING THINGS: MORE THAN HALF THE DAYS
9. THOUGHTS THAT YOU WOULD BE BETTER OFF DEAD, OR OF HURTING YOURSELF: 0
10. IF YOU CHECKED OFF ANY PROBLEMS, HOW DIFFICULT HAVE THESE PROBLEMS MADE IT FOR YOU TO DO YOUR WORK, TAKE CARE OF THINGS AT HOME, OR GET ALONG WITH OTHER PEOPLE: VERY DIFFICULT
SUM OF ALL RESPONSES TO PHQ QUESTIONS 1-9: 16
6. FEELING BAD ABOUT YOURSELF - OR THAT YOU ARE A FAILURE OR HAVE LET YOURSELF OR YOUR FAMILY DOWN: MORE THAN HALF THE DAYS
8. MOVING OR SPEAKING SO SLOWLY THAT OTHER PEOPLE COULD HAVE NOTICED. OR THE OPPOSITE - BEING SO FIDGETY OR RESTLESS THAT YOU HAVE BEEN MOVING AROUND A LOT MORE THAN USUAL: MORE THAN HALF THE DAYS
7. TROUBLE CONCENTRATING ON THINGS, SUCH AS READING THE NEWSPAPER OR WATCHING TELEVISION: 2
SUM OF ALL RESPONSES TO PHQ9 QUESTIONS 1 & 2: 4
5. POOR APPETITE OR OVEREATING: 2
6. FEELING BAD ABOUT YOURSELF - OR THAT YOU ARE A FAILURE OR HAVE LET YOURSELF OR YOUR FAMILY DOWN: 2
8. MOVING OR SPEAKING SO SLOWLY THAT OTHER PEOPLE COULD HAVE NOTICED. OR THE OPPOSITE, BEING SO FIGETY OR RESTLESS THAT YOU HAVE BEEN MOVING AROUND A LOT MORE THAN USUAL: 2
2. FEELING DOWN, DEPRESSED OR HOPELESS: MORE THAN HALF THE DAYS
7. TROUBLE CONCENTRATING ON THINGS, SUCH AS READING THE NEWSPAPER OR WATCHING TELEVISION: MORE THAN HALF THE DAYS
1. LITTLE INTEREST OR PLEASURE IN DOING THINGS: 2
4. FEELING TIRED OR HAVING LITTLE ENERGY: MORE THAN HALF THE DAYS
3. TROUBLE FALLING OR STAYING ASLEEP: 2

## 2023-08-07 ENCOUNTER — OFFICE VISIT (OUTPATIENT)
Dept: FAMILY MEDICINE CLINIC | Facility: CLINIC | Age: 63
End: 2023-08-07
Payer: MEDICARE

## 2023-08-07 ENCOUNTER — HOSPITAL ENCOUNTER (OUTPATIENT)
Dept: PHYSICAL THERAPY | Age: 63
Setting detail: RECURRING SERIES
Discharge: HOME | End: 2023-08-10
Payer: MEDICARE

## 2023-08-07 VITALS
HEART RATE: 95 BPM | SYSTOLIC BLOOD PRESSURE: 129 MMHG | DIASTOLIC BLOOD PRESSURE: 75 MMHG | OXYGEN SATURATION: 96 % | WEIGHT: 201 LBS | HEIGHT: 64 IN | TEMPERATURE: 97.2 F | BODY MASS INDEX: 34.31 KG/M2 | RESPIRATION RATE: 16 BRPM

## 2023-08-07 DIAGNOSIS — E03.9 ACQUIRED HYPOTHYROIDISM: Primary | ICD-10-CM

## 2023-08-07 DIAGNOSIS — E03.9 ACQUIRED HYPOTHYROIDISM: ICD-10-CM

## 2023-08-07 DIAGNOSIS — R91.1 LUNG NODULE: ICD-10-CM

## 2023-08-07 DIAGNOSIS — H69.83 DYSFUNCTION OF BOTH EUSTACHIAN TUBES: ICD-10-CM

## 2023-08-07 PROCEDURE — 97110 THERAPEUTIC EXERCISES: CPT

## 2023-08-07 PROCEDURE — 99214 OFFICE O/P EST MOD 30 MIN: CPT | Performed by: FAMILY MEDICINE

## 2023-08-07 PROCEDURE — 3017F COLORECTAL CA SCREEN DOC REV: CPT | Performed by: FAMILY MEDICINE

## 2023-08-07 PROCEDURE — 1036F TOBACCO NON-USER: CPT | Performed by: FAMILY MEDICINE

## 2023-08-07 PROCEDURE — 97140 MANUAL THERAPY 1/> REGIONS: CPT

## 2023-08-07 PROCEDURE — G8427 DOCREV CUR MEDS BY ELIG CLIN: HCPCS | Performed by: FAMILY MEDICINE

## 2023-08-07 PROCEDURE — G8417 CALC BMI ABV UP PARAM F/U: HCPCS | Performed by: FAMILY MEDICINE

## 2023-08-07 RX ORDER — FLUTICASONE PROPIONATE 50 MCG
2 SPRAY, SUSPENSION (ML) NASAL DAILY
Qty: 16 G | Refills: 5 | Status: SHIPPED | OUTPATIENT
Start: 2023-08-07

## 2023-08-07 RX ORDER — LEVOTHYROXINE SODIUM 0.07 MG/1
75 TABLET ORAL DAILY
Qty: 30 TABLET | Refills: 2 | Status: SHIPPED | OUTPATIENT
Start: 2023-08-07

## 2023-08-08 LAB — TSH, 3RD GENERATION: 36.2 UIU/ML (ref 0.36–3.74)

## 2023-08-08 NOTE — RESULT ENCOUNTER NOTE
Let pt. Know thyroid is still out of adjustment. Get on the new dose of 75 mcg of thyroid hormone, and they need to follow up in 3 month lab appointment.

## 2023-08-09 ENCOUNTER — HOSPITAL ENCOUNTER (OUTPATIENT)
Dept: PHYSICAL THERAPY | Age: 63
Setting detail: RECURRING SERIES
Discharge: HOME | End: 2023-08-12
Payer: MEDICARE

## 2023-08-09 PROCEDURE — 97110 THERAPEUTIC EXERCISES: CPT

## 2023-08-09 NOTE — PROGRESS NOTES
Vickey Oliver  : 1960  Primary: [address] [facility] Medicare (Medicare Managed)  Secondary: [address] @ [address] [facility] [address]  Phone: [phone]  Fax: [phone] Plan Frequency: 2 visits per week for 6 weeks    Plan of Care/Certification Expiration Date: 23      PT Visit Info:  Plan Frequency: 2 visits per week for 6 weeks  Plan of Care/Certification Expiration Date: 23      Visit Count:  7                OUTPATIENT PHYSICAL THERAPY:             OP NOTE TYPE: Progress Report 2023               Episode (Chronic bilateral shoulder pain) Appt Desk         Treatment Diagnosis:  Pain in Left Shoulder (M25.512)  Stiffness of Left Shoulder, Not elsewhere classified (M25.612)  Pain in Right Shoulder (M25.511)  Stiffness of Right Shoulder, Not elsewhere classified (M25.611)  Abnormal posture (R29.3)  Medical/Referring Diagnosis:  Chronic pain of both shoulders [M25.511, G89.29, M25.512]  Referring Physician:  Livia Worthington MD MD Orders:  PT Eval and Treat   Return MD Appt:  TBD  Date of Onset:  Onset Date:  (3 months ago)      Allergies:  Sulfa antibiotics  Restrictions/Precautions:    Restrictions/Precautions: None        Medications Last Reviewed:  2023     SUBJECTIVE   History of Injury/Illness (Reason for Referral):  Reports that she takes medicine to help her sleep, but shoulder pain wakes her up after 2-3 hours of sleep. Reports that she has been having shoulder pain for a couple of months. R shoulder is more painful than the left. Has not had any imaging on her shoulders. Patricia Dire twice last month and has fallen once this month. Has a history of carpal tunnel surgery in both hands. Shoulder bothers her when she \"moves the wrong way\".    Patient Stated Goal(s):  Improve sleep and be able to use R shoulder with less pain  Initial:      /10 Post Session:   Not rated   /10  Past
exercises. Treatment/Session Summary:    >Treatment Assessment: Did well with increased resistance with front raises/shoulder flexion and scaption. Cued patient to hold dumbbell with thumbs up/neutral  with shoulder flexion to minimize popping to R shoulder. Communication/Consultation:   None today  Equipment provided today:  HEP  Recommendations/Intent for next treatment session: Next visit will focus on improving patient's posture, and reducing R shoulder pain.     >Total Treatment Billable Duration:  47 minutes  Time In: 0030  Time Out: 0945    Saray Harley PT       Charge Capture  }Post Session Pain  PT Visit Info  [employer] Portal  MD Guidelines  Scanned Media  Benefits  MyChart    Future Appointments   Date Time Provider [address]   8/14/2023  9:00 AM Livan House, PT [facility]   8/16/2023 10:00 AM Jacey Farley, PTA [facility]   8/25/2023  8:00 PM Livan House, PT [facility]   8/29/2023  2:30 PM Livan House, PT [facility]   10/11/2023 11:00 AM Corky Mcburney, MD [facility]   4/3/2024 10:00 AM FPA [facility]CELL[facility]OUS [facility]   4/8/2024  1:30  [address] [employer] [facility] [address]   4/8/2024  2:00 PM Eva Ward MD [facility]   4/10/2024 11:00 AM Corky Mcburney, MD [facility]

## 2023-08-14 ENCOUNTER — HOSPITAL ENCOUNTER (OUTPATIENT)
Dept: PHYSICAL THERAPY | Age: 63
Setting detail: RECURRING SERIES
Discharge: HOME | End: 2023-08-17
Payer: MEDICARE

## 2023-08-14 PROCEDURE — 97110 THERAPEUTIC EXERCISES: CPT

## 2023-08-14 NOTE — PROGRESS NOTES
proper body posture and promote proper body mechanics. Progressed resistance, range, and repetitions as indicated. Date  8/9/23 Date  8/14/23 Date:  8/7/23   Activity/Exercise   Parameters   Bilateral external rotations Green  3 x 10 B Red  3 x10 B Blue  2 x 10 B, seated   Chin tucks Seated  2 x 10, 5 sec holds  Seated  2 x 15, 5 seconds Seated in chair, 2 x 10, 5 second hold   Scapular retractions Seated  Blue  1 x 12 B    Black  2 x 12 B    Low row  Black 3 x 10 B Seated  Black  4 x 12 B    Low row  Seated  3 x 12 B Black  3 x 10, seated B   PROM -- Standing active-assisted ROM x 10 ea UE To each shoulder in supine for flexion, abduction, IR and ER x 10 minutes   UBE  --  Level 2, x3 mins fwd & x3 mins bkwd for ROM   Shoulder rows & low rows -- See above Black band 3x10, 5 sec hold each (low rows), seated   Wand exercises -- Seated   X 20 B with cane Supine x 15 reps for flexion bilaterally   Pulleys Flexion x 15  Scaption x 15 Flexion x 20  Scaption x 20  **In sitting Flexion x 20  Scaption x 20 with cues for hand placement on both   Shoulder flexion 1# 3 x 10 R, seated 2# 2 x 10 B    Wall slides  2 x 10 ea  1# 3 x 10 R, seated   Shoulder scaption 1# 3 x 10 R 2# 2 x 10 B 1# 3 x 10 R, seated   External rotations Red  Single band 3 x 10 R Red 3 x 10 seated, each UE --                    MANUAL THERAPY: (0 minutes):   Joint mobilization and Soft tissue mobilization was utilized and necessary because of the patient's restricted joint motion, loss of articular motion, and restricted motion of soft tissue. Soft tissue mobilizations to R infraspinatus and R posterior shoulder to reduce tightness and pain. - None today    MODALITIES: (0 minutes):        None today      HEP: As above; handouts given to patient for all exercises. Treatment/Session Summary:    >Treatment Assessment:   Did well with exercises without signficant increase in shoulder pain. Did well with wall slides.  More limited by back pain today

## 2023-08-15 NOTE — PROGRESS NOTES
Elizabeth Carpio  : 1960  Primary: [address] Medicare (Medicare Managed)  Secondary: [address] @ [address] [address]  Phone: [phone]  Fax: [phone] Plan Frequency: 2 visits per week for 6 weeks  Plan of Care/Certification Expiration Date: 23      >PT Visit Info  Plan Frequency: 2 visits per week for 6 weeks  Plan of Care/Certification Expiration Date: 23      Visit Count:  9    OUTPATIENT PHYSICAL THERAPY:OP NOTE TYPE: OP Note Type: Treatment Note 2023       Episode  }Appt Desk             Treatment Diagnosis:  Pain in Left Shoulder (M25.512)  Stiffness of Left Shoulder, Not elsewhere classified (M25.612)  Pain in Right Shoulder (M25.511)  Stiffness of Right Shoulder, Not elsewhere classified (M25.611)  Abnormal posture (R29.3)  Medical/Referring Diagnosis:  Chronic pain of both shoulders [M25.511, G89.29, M25.512]  Referring Physician:  Matthew Triana MD MD Orders:  PT Eval and Treat   Date of Onset:  Onset Date:  (3 months ago)     Allergies:   Sulfa antibiotics  Restrictions/Precautions:  Restrictions/Precautions: None  No data recorded   Interventions Planned (Treatment may consist of any combination of the following):    Current Treatment Recommendations: Strengthening; ROM; Therapeutic activities; Modalities; Dry needling; Patient/Caregiver education & training; Home exercise program; Manual; Neuromuscular re-education     >Subjective Comments: Pt. reported she had \"really bad\" low back pain all last night. Pt. stated it felt \"a little better\" this morning prior to therapy. >Initial:      3-4/10>Post Session:        3/10  Medications Last Reviewed:  2023  Updated Objective Findings:  Pt. presented with back spasms throughout the entirety of today's treatment session.    Treatment     THERAPEUTIC EXERCISE: (53 minutes):    Exercises per grid below to

## 2023-08-16 ENCOUNTER — HOSPITAL ENCOUNTER (OUTPATIENT)
Dept: PHYSICAL THERAPY | Age: 63
Setting detail: RECURRING SERIES
Discharge: HOME | End: 2023-08-19
Payer: MEDICARE

## 2023-08-16 PROCEDURE — 97110 THERAPEUTIC EXERCISES: CPT

## 2023-08-25 ENCOUNTER — HOSPITAL ENCOUNTER (OUTPATIENT)
Dept: PHYSICAL THERAPY | Age: 63
Setting detail: RECURRING SERIES
End: 2023-08-25
Payer: MEDICARE

## 2023-08-25 NOTE — PROGRESS NOTES
Physical Therapy  [address] [place] [address]  Date: 8/25/2023          Patient could not make today's appointment due to being out of town.  Patient's next therapy visit will be Tuesday 8/29 at 2:30 PM.           Rosalio Galeano PTA

## 2023-08-29 ENCOUNTER — HOSPITAL ENCOUNTER (OUTPATIENT)
Dept: PHYSICAL THERAPY | Age: 63
Setting detail: RECURRING SERIES
Discharge: HOME | End: 2023-09-01
Payer: MEDICARE

## 2023-08-29 PROCEDURE — 97110 THERAPEUTIC EXERCISES: CPT

## 2023-08-29 ASSESSMENT — PAIN SCALES - GENERAL: PAINLEVEL_OUTOF10: 2

## 2023-08-29 NOTE — PROGRESS NOTES
promote proper body mechanics. Progressed resistance, range, and repetitions as indicated. Date  8/29/23 Date  8/14/23 Date:  8/16/23   Activity/Exercise   Parameters   Bilateral external rotations Green  3 x 10 B Red  3 x10 B Green  3 x 10 B   Chin tucks Seated  2 x 10, 5 sec holds  Seated  2 x 15, 5 seconds Seated  2 x 15, 5 seconds   Scapular retractions Seated  Green  High row  2 x 10 B    Mid row  2 x 10 B    Low row  Black 2 x 10 B Seated  Black  4 x 12 B    Low row  Seated  3 x 12 B High row  Seated  Blue band  3 x 12 B    Mid row  Seated  Blue band  3 x 12 B    Low row  Seated  Blue band  3 x 12 B   PROM -- Standing active-assisted ROM x 10 ea UE Standing active-assisted ROM x 10 ea UE   UBE  --  --   Shoulder rows & low rows -- See above See above   Wand exercises -- Seated   X 20 B with cane Seated   X 20 B with cane   Pulleys -- Flexion x 20  Scaption x 20  **In sitting Flexion x 20  Scaption x 20  **In sitting   Shoulder flexion -- 2# 2 x 10 B    Wall slides  2 x 10 ea  2# 2 x 10 B    Wall slides  2 x 10 ea    Shoulder scaption -- 2# 2 x 10 B 2# 2 x 10 B   External rotations Green  Single band x 10 RUE Red 3 x 10 seated, each UE Red,  single band, 3 x 10 RUE   Education Discharge joan, updated HEP, prognoses going forward. MANUAL THERAPY: (0 minutes):   Joint mobilization and Soft tissue mobilization was utilized and necessary because of the patient's restricted joint motion, loss of articular motion, and restricted motion of soft tissue. Soft tissue mobilizations to R infraspinatus and R posterior shoulder to reduce tightness and pain. - None today    MODALITIES: (0 minutes):        None today      HEP: As above; handouts given to patient for all exercises. Treatment/Session Summary:    >Treatment Assessment: Pt. showed good understanding and responded well to updated home exercise program, progress going forward at home, and being discharged today.       Communication/Consultation:

## 2023-08-30 NOTE — THERAPY DISCHARGE
Mishel Manner  : 1960  Primary: [address] [facility] Medicare (Medicare Managed)  Secondary: [address] @ [address] [address]  Phone: [phone]  Fax: [phone]                      OUTPATIENT PHYSICAL THERAPY:             OP NOTE TYPE: Discontinuation Summary 2023               Episode (Chronic bilateral shoulder pain) Appt Desk         Treatment Diagnosis:  Pain in Left Shoulder (M25.512)  Stiffness of Left Shoulder, Not elsewhere classified (M25.612)  Pain in Right Shoulder (M25.511)  Stiffness of Right Shoulder, Not elsewhere classified (M25.611)  Abnormal posture (R29.3)  Medical/Referring Diagnosis:  Chronic pain of both shoulders [M25.511, G89.29, M25.512]  Referring Physician:  Luis Stern MD MD Orders:  PT Eval and Treat   Return MD Appt:  TBD  Date of Onset:  Onset Date:  (3 months ago)      Allergies:  Sulfa antibiotics  Restrictions/Precautions:    Restrictions/Precautions: None        Medications Last Reviewed:  2023     SUBJECTIVE   History of Injury/Illness (Reason for Referral):  Reports that she takes medicine to help her sleep, but shoulder pain wakes her up after 2-3 hours of sleep. Reports that she has been having shoulder pain for a couple of months. R shoulder is more painful than the left. Has not had any imaging on her shoulders. Patrick Caballero twice last month and has fallen once this month. Has a history of carpal tunnel surgery in both hands. Shoulder bothers her when she \"moves the wrong way\".    Patient Stated Goal(s):  Improve sleep and be able to use R shoulder with less pain  Initial:     2/10 Post Session:   Not rated  2/10  Past Medical History/Comorbidities: see EMR  Ms. Yoko Trevino  has a past medical history of Anxiety, Arthritis, Atypical ductal hyperplasia of right breast, Breast cancer ([address]), Cancer ([address]), Carpal tunnel syndrome, Chronic obstructive

## 2023-10-11 ENCOUNTER — OFFICE VISIT (OUTPATIENT)
Dept: FAMILY MEDICINE CLINIC | Facility: CLINIC | Age: 63
End: 2023-10-11
Payer: MEDICARE

## 2023-10-11 VITALS
OXYGEN SATURATION: 97 % | WEIGHT: 207 LBS | TEMPERATURE: 97.2 F | RESPIRATION RATE: 16 BRPM | BODY MASS INDEX: 35.34 KG/M2 | DIASTOLIC BLOOD PRESSURE: 106 MMHG | SYSTOLIC BLOOD PRESSURE: 148 MMHG | HEIGHT: 64 IN | HEART RATE: 106 BPM

## 2023-10-11 DIAGNOSIS — E03.9 ACQUIRED HYPOTHYROIDISM: ICD-10-CM

## 2023-10-11 DIAGNOSIS — G40.909 SEIZURE DISORDER (HCC): ICD-10-CM

## 2023-10-11 DIAGNOSIS — E66.01 SEVERE OBESITY (BMI 35.0-39.9) WITH COMORBIDITY (HCC): ICD-10-CM

## 2023-10-11 DIAGNOSIS — E03.9 ACQUIRED HYPOTHYROIDISM: Primary | ICD-10-CM

## 2023-10-11 LAB — TSH, 3RD GENERATION: 15.1 UIU/ML (ref 0.36–3.74)

## 2023-10-11 PROCEDURE — 1036F TOBACCO NON-USER: CPT | Performed by: FAMILY MEDICINE

## 2023-10-11 PROCEDURE — G8417 CALC BMI ABV UP PARAM F/U: HCPCS | Performed by: FAMILY MEDICINE

## 2023-10-11 PROCEDURE — G8427 DOCREV CUR MEDS BY ELIG CLIN: HCPCS | Performed by: FAMILY MEDICINE

## 2023-10-11 PROCEDURE — 3017F COLORECTAL CA SCREEN DOC REV: CPT | Performed by: FAMILY MEDICINE

## 2023-10-11 PROCEDURE — G8484 FLU IMMUNIZE NO ADMIN: HCPCS | Performed by: FAMILY MEDICINE

## 2023-10-11 PROCEDURE — 99214 OFFICE O/P EST MOD 30 MIN: CPT | Performed by: FAMILY MEDICINE

## 2023-10-11 RX ORDER — LEVOTHYROXINE SODIUM 88 UG/1
88 TABLET ORAL DAILY
Qty: 30 TABLET | Refills: 1 | Status: SHIPPED | OUTPATIENT
Start: 2023-10-11

## 2023-10-13 NOTE — RESULT ENCOUNTER NOTE
Let pt. Know thyroid is still out of adjustment. Increase her thyroid medicine as we talked about. Follow up in 2 months for lab visit to repeat her thyroid blood work.

## 2023-12-05 ENCOUNTER — NURSE ONLY (OUTPATIENT)
Dept: FAMILY MEDICINE CLINIC | Facility: CLINIC | Age: 63
End: 2023-12-05

## 2023-12-05 DIAGNOSIS — E03.9 ACQUIRED HYPOTHYROIDISM: ICD-10-CM

## 2023-12-05 LAB — TSH, 3RD GENERATION: 16.4 UIU/ML (ref 0.36–3.74)

## 2023-12-06 DIAGNOSIS — E03.9 ACQUIRED HYPOTHYROIDISM: ICD-10-CM

## 2023-12-06 RX ORDER — LEVOTHYROXINE SODIUM 0.1 MG/1
100 TABLET ORAL DAILY
Qty: 90 TABLET | Refills: 0 | Status: SHIPPED | OUTPATIENT
Start: 2023-12-06

## 2023-12-06 NOTE — RESULT ENCOUNTER NOTE
Let pt. Know thyroid is still out of adjustment. Have eprescribed a new dose to their pharmacy and they need to follow up in 3 months for lab visit only - please make them an appt.

## 2023-12-22 ENCOUNTER — HOSPITAL ENCOUNTER (OUTPATIENT)
Dept: MAMMOGRAPHY | Age: 63
Discharge: HOME | End: 2023-12-25
Attending: INTERNAL MEDICINE
Payer: MEDICARE

## 2023-12-22 DIAGNOSIS — Z12.31 ENCOUNTER FOR SCREENING MAMMOGRAM FOR MALIGNANT NEOPLASM OF BREAST: ICD-10-CM

## 2023-12-22 PROCEDURE — 77063 BREAST TOMOSYNTHESIS BI: CPT

## 2024-03-05 ASSESSMENT — PATIENT HEALTH QUESTIONNAIRE - PHQ9
5. POOR APPETITE OR OVEREATING: 3
1. LITTLE INTEREST OR PLEASURE IN DOING THINGS: 3
4. FEELING TIRED OR HAVING LITTLE ENERGY: NEARLY EVERY DAY
SUM OF ALL RESPONSES TO PHQ QUESTIONS 1-9: 23
6. FEELING BAD ABOUT YOURSELF - OR THAT YOU ARE A FAILURE OR HAVE LET YOURSELF OR YOUR FAMILY DOWN: 3
3. TROUBLE FALLING OR STAYING ASLEEP: NEARLY EVERY DAY
4. FEELING TIRED OR HAVING LITTLE ENERGY: 3
SUM OF ALL RESPONSES TO PHQ QUESTIONS 1-9: 23
10. IF YOU CHECKED OFF ANY PROBLEMS, HOW DIFFICULT HAVE THESE PROBLEMS MADE IT FOR YOU TO DO YOUR WORK, TAKE CARE OF THINGS AT HOME, OR GET ALONG WITH OTHER PEOPLE: 2
2. FEELING DOWN, DEPRESSED OR HOPELESS: 3
SUM OF ALL RESPONSES TO PHQ9 QUESTIONS 1 & 2: 6
1. LITTLE INTEREST OR PLEASURE IN DOING THINGS: NEARLY EVERY DAY
8. MOVING OR SPEAKING SO SLOWLY THAT OTHER PEOPLE COULD HAVE NOTICED. OR THE OPPOSITE, BEING SO FIGETY OR RESTLESS THAT YOU HAVE BEEN MOVING AROUND A LOT MORE THAN USUAL: 2
9. THOUGHTS THAT YOU WOULD BE BETTER OFF DEAD, OR OF HURTING YOURSELF: 0
8. MOVING OR SPEAKING SO SLOWLY THAT OTHER PEOPLE COULD HAVE NOTICED. OR THE OPPOSITE - BEING SO FIDGETY OR RESTLESS THAT YOU HAVE BEEN MOVING AROUND A LOT MORE THAN USUAL: MORE THAN HALF THE DAYS
10. IF YOU CHECKED OFF ANY PROBLEMS, HOW DIFFICULT HAVE THESE PROBLEMS MADE IT FOR YOU TO DO YOUR WORK, TAKE CARE OF THINGS AT HOME, OR GET ALONG WITH OTHER PEOPLE: VERY DIFFICULT
6. FEELING BAD ABOUT YOURSELF - OR THAT YOU ARE A FAILURE OR HAVE LET YOURSELF OR YOUR FAMILY DOWN: NEARLY EVERY DAY
7. TROUBLE CONCENTRATING ON THINGS, SUCH AS READING THE NEWSPAPER OR WATCHING TELEVISION: 3
SUM OF ALL RESPONSES TO PHQ QUESTIONS 1-9: 23
3. TROUBLE FALLING OR STAYING ASLEEP: 3
7. TROUBLE CONCENTRATING ON THINGS, SUCH AS READING THE NEWSPAPER OR WATCHING TELEVISION: NEARLY EVERY DAY
9. THOUGHTS THAT YOU WOULD BE BETTER OFF DEAD, OR OF HURTING YOURSELF: NOT AT ALL
2. FEELING DOWN, DEPRESSED OR HOPELESS: NEARLY EVERY DAY
5. POOR APPETITE OR OVEREATING: NEARLY EVERY DAY

## 2024-03-05 ASSESSMENT — COLUMBIA-SUICIDE SEVERITY RATING SCALE - C-SSRS
1. IN THE PAST MONTH, HAVE YOU WISHED YOU WERE DEAD OR WISHED YOU COULD GO TO SLEEP AND NOT WAKE UP?: NO
6. IN YOUR LIFETIME, HAVE YOU EVER DONE ANYTHING, STARTED TO DO ANYTHING, OR PREPARED TO DO ANYTHING TO END YOUR LIFE?: NO
2. IN THE PAST MONTH, HAVE YOU ACTUALLY HAD ANY THOUGHTS OF KILLING YOURSELF?: NO

## 2024-03-08 ENCOUNTER — OFFICE VISIT (OUTPATIENT)
Dept: FAMILY MEDICINE CLINIC | Facility: CLINIC | Age: 64
End: 2024-03-08
Payer: MEDICARE

## 2024-03-08 VITALS
HEIGHT: 64 IN | TEMPERATURE: 97.5 F | SYSTOLIC BLOOD PRESSURE: 147 MMHG | WEIGHT: 208.8 LBS | BODY MASS INDEX: 35.65 KG/M2 | HEART RATE: 102 BPM | RESPIRATION RATE: 17 BRPM | DIASTOLIC BLOOD PRESSURE: 98 MMHG | OXYGEN SATURATION: 99 %

## 2024-03-08 DIAGNOSIS — E03.9 ACQUIRED HYPOTHYROIDISM: Primary | ICD-10-CM

## 2024-03-08 DIAGNOSIS — F33.42 RECURRENT MAJOR DEPRESSIVE DISORDER, IN FULL REMISSION (HCC): ICD-10-CM

## 2024-03-08 DIAGNOSIS — Z72.0 TOBACCO ABUSE: ICD-10-CM

## 2024-03-08 DIAGNOSIS — E66.01 SEVERE OBESITY (BMI 35.0-39.9) WITH COMORBIDITY (HCC): ICD-10-CM

## 2024-03-08 DIAGNOSIS — G40.909 SEIZURE DISORDER (HCC): ICD-10-CM

## 2024-03-08 DIAGNOSIS — E78.00 HIGH CHOLESTEROL: ICD-10-CM

## 2024-03-08 DIAGNOSIS — D50.8 IRON DEFICIENCY ANEMIA SECONDARY TO INADEQUATE DIETARY IRON INTAKE: ICD-10-CM

## 2024-03-08 DIAGNOSIS — J41.0 SIMPLE CHRONIC BRONCHITIS (HCC): ICD-10-CM

## 2024-03-08 LAB
ALBUMIN SERPL-MCNC: 4.3 G/DL (ref 3.2–4.6)
ALBUMIN/GLOB SERPL: 1.4 (ref 0.4–1.6)
ALP SERPL-CCNC: 112 U/L (ref 50–136)
ALT SERPL-CCNC: 52 U/L (ref 12–65)
ANION GAP SERPL CALC-SCNC: 5 MMOL/L (ref 2–11)
AST SERPL-CCNC: 32 U/L (ref 15–37)
BASOPHILS # BLD: 0.1 K/UL (ref 0–0.2)
BASOPHILS NFR BLD: 1 % (ref 0–2)
BILIRUB SERPL-MCNC: 0.3 MG/DL (ref 0.2–1.1)
BUN SERPL-MCNC: 13 MG/DL (ref 8–23)
CALCIUM SERPL-MCNC: 9.5 MG/DL (ref 8.3–10.4)
CHLORIDE SERPL-SCNC: 113 MMOL/L (ref 103–113)
CHOLEST SERPL-MCNC: 168 MG/DL
CO2 SERPL-SCNC: 25 MMOL/L (ref 21–32)
CREAT SERPL-MCNC: 0.9 MG/DL (ref 0.6–1)
DIFFERENTIAL METHOD BLD: ABNORMAL
EOSINOPHIL # BLD: 0.2 K/UL (ref 0–0.8)
EOSINOPHIL NFR BLD: 1 % (ref 0.5–7.8)
ERYTHROCYTE [DISTWIDTH] IN BLOOD BY AUTOMATED COUNT: 13.3 % (ref 11.9–14.6)
GLOBULIN SER CALC-MCNC: 3.1 G/DL (ref 2.8–4.5)
GLUCOSE SERPL-MCNC: 106 MG/DL (ref 65–100)
HCT VFR BLD AUTO: 44.9 % (ref 35.8–46.3)
HDLC SERPL-MCNC: 51 MG/DL (ref 40–60)
HDLC SERPL: 3.3
HGB BLD-MCNC: 14.5 G/DL (ref 11.7–15.4)
IMM GRANULOCYTES # BLD AUTO: 0 K/UL (ref 0–0.5)
IMM GRANULOCYTES NFR BLD AUTO: 0 % (ref 0–5)
LDLC SERPL CALC-MCNC: 90.6 MG/DL
LYMPHOCYTES # BLD: 4.1 K/UL (ref 0.5–4.6)
LYMPHOCYTES NFR BLD: 35 % (ref 13–44)
MCH RBC QN AUTO: 29.9 PG (ref 26.1–32.9)
MCHC RBC AUTO-ENTMCNC: 32.3 G/DL (ref 31.4–35)
MCV RBC AUTO: 92.6 FL (ref 82–102)
MONOCYTES # BLD: 0.8 K/UL (ref 0.1–1.3)
MONOCYTES NFR BLD: 6 % (ref 4–12)
NEUTS SEG # BLD: 6.5 K/UL (ref 1.7–8.2)
NEUTS SEG NFR BLD: 57 % (ref 43–78)
NRBC # BLD: 0 K/UL (ref 0–0.2)
PLATELET # BLD AUTO: 361 K/UL (ref 150–450)
PMV BLD AUTO: 8.3 FL (ref 9.4–12.3)
POTASSIUM SERPL-SCNC: 3.7 MMOL/L (ref 3.5–5.1)
PROT SERPL-MCNC: 7.4 G/DL (ref 6.3–8.2)
RBC # BLD AUTO: 4.85 M/UL (ref 4.05–5.2)
SODIUM SERPL-SCNC: 143 MMOL/L (ref 136–146)
TRIGL SERPL-MCNC: 132 MG/DL (ref 35–150)
TSH, 3RD GENERATION: 11 UIU/ML (ref 0.36–3.74)
VLDLC SERPL CALC-MCNC: 26.4 MG/DL (ref 6–23)
WBC # BLD AUTO: 11.7 K/UL (ref 4.3–11.1)

## 2024-03-08 PROCEDURE — G8417 CALC BMI ABV UP PARAM F/U: HCPCS | Performed by: FAMILY MEDICINE

## 2024-03-08 PROCEDURE — 3017F COLORECTAL CA SCREEN DOC REV: CPT | Performed by: FAMILY MEDICINE

## 2024-03-08 PROCEDURE — 99214 OFFICE O/P EST MOD 30 MIN: CPT | Performed by: FAMILY MEDICINE

## 2024-03-08 PROCEDURE — 3023F SPIROM DOC REV: CPT | Performed by: FAMILY MEDICINE

## 2024-03-08 PROCEDURE — G8427 DOCREV CUR MEDS BY ELIG CLIN: HCPCS | Performed by: FAMILY MEDICINE

## 2024-03-08 PROCEDURE — 1036F TOBACCO NON-USER: CPT | Performed by: FAMILY MEDICINE

## 2024-03-08 PROCEDURE — G8484 FLU IMMUNIZE NO ADMIN: HCPCS | Performed by: FAMILY MEDICINE

## 2024-03-08 RX ORDER — ROSUVASTATIN CALCIUM 10 MG/1
10 TABLET, COATED ORAL NIGHTLY
Qty: 90 TABLET | Refills: 3 | Status: SHIPPED | OUTPATIENT
Start: 2024-03-08

## 2024-03-08 RX ORDER — LEVOTHYROXINE SODIUM 112 UG/1
112 TABLET ORAL DAILY
Qty: 90 TABLET | Refills: 0 | Status: SHIPPED | OUTPATIENT
Start: 2024-03-08

## 2024-03-08 RX ORDER — BENZONATATE 100 MG/1
CAPSULE ORAL
Qty: 20 CAPSULE | Refills: 0 | Status: SHIPPED | OUTPATIENT
Start: 2024-03-08

## 2024-03-08 RX ORDER — DULOXETIN HYDROCHLORIDE 30 MG/1
30 CAPSULE, DELAYED RELEASE ORAL DAILY
COMMUNITY

## 2024-03-08 RX ORDER — VARENICLINE TARTRATE 1 MG/1
TABLET, FILM COATED ORAL
Qty: 60 TABLET | Refills: 5 | Status: SHIPPED | OUTPATIENT
Start: 2024-03-08

## 2024-03-08 NOTE — PROGRESS NOTES
release capsule Take 1 capsule by mouth daily      rosuvastatin (CRESTOR) 10 MG tablet Take 1 tablet by mouth nightly 90 tablet 3    levothyroxine (SYNTHROID) 112 MCG tablet Take 1 tablet by mouth daily 90 tablet 0    varenicline (CHANTIX) 1 MG tablet one po bid - stop smoking on the 7th day 60 tablet 5    benzonatate (TESSALON PERLES) 100 MG capsule One po tid prn cough 20 capsule 0    fluticasone (FLONASE) 50 MCG/ACT nasal spray 2 sprays by Each Nostril route daily 16 g 5    albuterol sulfate HFA (PROVENTIL;VENTOLIN;PROAIR) 108 (90 Base) MCG/ACT inhaler Inhale 2 puffs into the lungs 4 times daily 18 g 11    magnesium (MAGNESIUM-OXIDE) 250 MG TABS tablet Take 1 tablet by mouth daily      Multiple Vitamins-Minerals (HAIR SKIN NAILS PO) Take by mouth daily      DULoxetine (CYMBALTA) 60 MG extended release capsule Take 1 capsule by mouth 2 times daily      lacosamide (VIMPAT) 200 MG tablet Take 1 tablet by mouth 2 times daily.      traZODone (DESYREL) 100 MG tablet Take 1 tablet by mouth nightly      zonisamide (ZONEGRAN) 100 MG capsule Take 3 capsules by mouth nightly      aspirin 81 MG EC tablet Take 1 tablet by mouth daily       No current facility-administered medications for this visit.       Family History   Problem Relation Age of Onset    Breast Cancer Sister 69    Stroke Brother     Diabetes Brother     Hypertension Father     Lung Disease Father         copd    Heart Disease Father     Hypertension Mother     Cancer Brother         Esophageal    Alcohol Abuse Father     Pacemaker Father        Social History     Socioeconomic History    Marital status:      Spouse name: Not on file    Number of children: Not on file    Years of education: Not on file    Highest education level: Not on file   Occupational History    Not on file   Tobacco Use    Smoking status: Former     Current packs/day: 0.00     Types: Cigarettes     Quit date: 2023     Years since quittin.8    Smokeless tobacco: Never

## 2024-04-04 DIAGNOSIS — Z86.000 HISTORY OF DUCTAL CARCINOMA IN SITU (DCIS) OF BREAST: Primary | ICD-10-CM

## 2024-04-05 SDOH — HEALTH STABILITY: PHYSICAL HEALTH: ON AVERAGE, HOW MANY DAYS PER WEEK DO YOU ENGAGE IN MODERATE TO STRENUOUS EXERCISE (LIKE A BRISK WALK)?: 0 DAYS

## 2024-04-05 ASSESSMENT — PATIENT HEALTH QUESTIONNAIRE - PHQ9
8. MOVING OR SPEAKING SO SLOWLY THAT OTHER PEOPLE COULD HAVE NOTICED. OR THE OPPOSITE, BEING SO FIGETY OR RESTLESS THAT YOU HAVE BEEN MOVING AROUND A LOT MORE THAN USUAL: SEVERAL DAYS
10. IF YOU CHECKED OFF ANY PROBLEMS, HOW DIFFICULT HAVE THESE PROBLEMS MADE IT FOR YOU TO DO YOUR WORK, TAKE CARE OF THINGS AT HOME, OR GET ALONG WITH OTHER PEOPLE: SOMEWHAT DIFFICULT
9. THOUGHTS THAT YOU WOULD BE BETTER OFF DEAD, OR OF HURTING YOURSELF: NOT AT ALL
7. TROUBLE CONCENTRATING ON THINGS, SUCH AS READING THE NEWSPAPER OR WATCHING TELEVISION: MORE THAN HALF THE DAYS
6. FEELING BAD ABOUT YOURSELF - OR THAT YOU ARE A FAILURE OR HAVE LET YOURSELF OR YOUR FAMILY DOWN: SEVERAL DAYS
5. POOR APPETITE OR OVEREATING: NEARLY EVERY DAY
2. FEELING DOWN, DEPRESSED OR HOPELESS: SEVERAL DAYS
4. FEELING TIRED OR HAVING LITTLE ENERGY: NEARLY EVERY DAY
SUM OF ALL RESPONSES TO PHQ QUESTIONS 1-9: 15
1. LITTLE INTEREST OR PLEASURE IN DOING THINGS: MORE THAN HALF THE DAYS
3. TROUBLE FALLING OR STAYING ASLEEP: MORE THAN HALF THE DAYS
SUM OF ALL RESPONSES TO PHQ9 QUESTIONS 1 & 2: 3
SUM OF ALL RESPONSES TO PHQ QUESTIONS 1-9: 15

## 2024-04-05 ASSESSMENT — LIFESTYLE VARIABLES
HOW OFTEN DO YOU HAVE SIX OR MORE DRINKS ON ONE OCCASION: 1
HOW OFTEN DO YOU HAVE A DRINK CONTAINING ALCOHOL: NEVER
HOW OFTEN DO YOU HAVE A DRINK CONTAINING ALCOHOL: 1
HOW MANY STANDARD DRINKS CONTAINING ALCOHOL DO YOU HAVE ON A TYPICAL DAY: PATIENT DOES NOT DRINK
HOW MANY STANDARD DRINKS CONTAINING ALCOHOL DO YOU HAVE ON A TYPICAL DAY: 0

## 2024-04-07 SDOH — ECONOMIC STABILITY: INCOME INSECURITY: HOW HARD IS IT FOR YOU TO PAY FOR THE VERY BASICS LIKE FOOD, HOUSING, MEDICAL CARE, AND HEATING?: NOT VERY HARD

## 2024-04-07 SDOH — ECONOMIC STABILITY: FOOD INSECURITY: WITHIN THE PAST 12 MONTHS, YOU WORRIED THAT YOUR FOOD WOULD RUN OUT BEFORE YOU GOT MONEY TO BUY MORE.: NEVER TRUE

## 2024-04-07 SDOH — ECONOMIC STABILITY: HOUSING INSECURITY
IN THE LAST 12 MONTHS, WAS THERE A TIME WHEN YOU DID NOT HAVE A STEADY PLACE TO SLEEP OR SLEPT IN A SHELTER (INCLUDING NOW)?: NO

## 2024-04-07 SDOH — ECONOMIC STABILITY: FOOD INSECURITY: WITHIN THE PAST 12 MONTHS, THE FOOD YOU BOUGHT JUST DIDN'T LAST AND YOU DIDN'T HAVE MONEY TO GET MORE.: NEVER TRUE

## 2024-04-08 ENCOUNTER — HOSPITAL ENCOUNTER (OUTPATIENT)
Dept: LAB | Age: 64
Discharge: HOME | End: 2024-04-11
Payer: MEDICARE

## 2024-04-08 ENCOUNTER — OFFICE VISIT (OUTPATIENT)
Dept: ONCOLOGY | Age: 64
End: 2024-04-08
Payer: MEDICARE

## 2024-04-08 VITALS
WEIGHT: 203 LBS | OXYGEN SATURATION: 97 % | HEIGHT: 64 IN | DIASTOLIC BLOOD PRESSURE: 83 MMHG | SYSTOLIC BLOOD PRESSURE: 135 MMHG | HEART RATE: 103 BPM | TEMPERATURE: 97.4 F | BODY MASS INDEX: 34.66 KG/M2 | RESPIRATION RATE: 16 BRPM

## 2024-04-08 DIAGNOSIS — Z86.000 HISTORY OF DUCTAL CARCINOMA IN SITU (DCIS) OF BREAST: ICD-10-CM

## 2024-04-08 DIAGNOSIS — D50.9 IRON DEFICIENCY ANEMIA, UNSPECIFIED IRON DEFICIENCY ANEMIA TYPE: Primary | ICD-10-CM

## 2024-04-08 DIAGNOSIS — D72.829 LEUKOCYTOSIS, UNSPECIFIED TYPE: ICD-10-CM

## 2024-04-08 LAB
ALBUMIN SERPL-MCNC: 4.2 G/DL (ref 3.2–4.6)
ALBUMIN/GLOB SERPL: 1.4 (ref 0.4–1.6)
ALP SERPL-CCNC: 119 U/L (ref 50–136)
ALT SERPL-CCNC: 43 U/L (ref 12–65)
ANION GAP SERPL CALC-SCNC: 8 MMOL/L (ref 2–11)
AST SERPL-CCNC: 29 U/L (ref 15–37)
BASOPHILS # BLD: 0.1 K/UL (ref 0–0.2)
BASOPHILS NFR BLD: 1 % (ref 0–2)
BILIRUB SERPL-MCNC: 0.4 MG/DL (ref 0.2–1.1)
BUN SERPL-MCNC: 35 MG/DL (ref 8–23)
CALCIUM SERPL-MCNC: 9.1 MG/DL (ref 8.3–10.4)
CHLORIDE SERPL-SCNC: 108 MMOL/L (ref 103–113)
CO2 SERPL-SCNC: 26 MMOL/L (ref 21–32)
CREAT SERPL-MCNC: 0.9 MG/DL (ref 0.6–1)
DIFFERENTIAL METHOD BLD: ABNORMAL
EOSINOPHIL # BLD: 0.1 K/UL (ref 0–0.8)
EOSINOPHIL NFR BLD: 1 % (ref 0.5–7.8)
ERYTHROCYTE [DISTWIDTH] IN BLOOD BY AUTOMATED COUNT: 13 % (ref 11.9–14.6)
GLOBULIN SER CALC-MCNC: 3 G/DL (ref 2.8–4.5)
GLUCOSE SERPL-MCNC: 96 MG/DL (ref 65–100)
HCT VFR BLD AUTO: 43 % (ref 35.8–46.3)
HGB BLD-MCNC: 14.4 G/DL (ref 11.7–15.4)
IMM GRANULOCYTES # BLD AUTO: 0 K/UL (ref 0–0.5)
IMM GRANULOCYTES NFR BLD AUTO: 0 % (ref 0–5)
LYMPHOCYTES # BLD: 3.3 K/UL (ref 0.5–4.6)
LYMPHOCYTES NFR BLD: 34 % (ref 13–44)
MCH RBC QN AUTO: 30.5 PG (ref 26.1–32.9)
MCHC RBC AUTO-ENTMCNC: 33.5 G/DL (ref 31.4–35)
MCV RBC AUTO: 91.1 FL (ref 82–102)
MONOCYTES # BLD: 0.7 K/UL (ref 0.1–1.3)
MONOCYTES NFR BLD: 7 % (ref 4–12)
NEUTS SEG # BLD: 5.6 K/UL (ref 1.7–8.2)
NEUTS SEG NFR BLD: 57 % (ref 43–78)
NRBC # BLD: 0 K/UL (ref 0–0.2)
PLATELET # BLD AUTO: 330 K/UL (ref 150–450)
PMV BLD AUTO: 8.1 FL (ref 9.4–12.3)
POTASSIUM SERPL-SCNC: 3.3 MMOL/L (ref 3.5–5.1)
PROT SERPL-MCNC: 7.2 G/DL (ref 6.3–8.2)
RBC # BLD AUTO: 4.72 M/UL (ref 4.05–5.2)
SODIUM SERPL-SCNC: 142 MMOL/L (ref 136–146)
WBC # BLD AUTO: 9.9 K/UL (ref 4.3–11.1)

## 2024-04-08 PROCEDURE — 36415 COLL VENOUS BLD VENIPUNCTURE: CPT

## 2024-04-08 PROCEDURE — 85025 COMPLETE CBC W/AUTO DIFF WBC: CPT

## 2024-04-08 PROCEDURE — 1036F TOBACCO NON-USER: CPT | Performed by: INTERNAL MEDICINE

## 2024-04-08 PROCEDURE — G8428 CUR MEDS NOT DOCUMENT: HCPCS | Performed by: INTERNAL MEDICINE

## 2024-04-08 PROCEDURE — 99214 OFFICE O/P EST MOD 30 MIN: CPT | Performed by: INTERNAL MEDICINE

## 2024-04-08 PROCEDURE — G8417 CALC BMI ABV UP PARAM F/U: HCPCS | Performed by: INTERNAL MEDICINE

## 2024-04-08 PROCEDURE — 80053 COMPREHEN METABOLIC PANEL: CPT

## 2024-04-08 PROCEDURE — 3017F COLORECTAL CA SCREEN DOC REV: CPT | Performed by: INTERNAL MEDICINE

## 2024-04-08 ASSESSMENT — PATIENT HEALTH QUESTIONNAIRE - PHQ9
SUM OF ALL RESPONSES TO PHQ QUESTIONS 1-9: 0
2. FEELING DOWN, DEPRESSED OR HOPELESS: NOT AT ALL
SUM OF ALL RESPONSES TO PHQ9 QUESTIONS 1 & 2: 0
SUM OF ALL RESPONSES TO PHQ QUESTIONS 1-9: 0

## 2024-04-08 NOTE — PATIENT INSTRUCTIONS
Patient Information from Today's Visit    - We will discharge you from our clinic    - make sure you are getting mammograms and low dose CT chest once a year with your primary care provider    Treatment Summary has been discussed and given to patient:No  Follow Up: if needed    Please refer to After Visit Summary or [email or web address] for upcoming appointment information. If you have any questions regarding your upcoming schedule please reach out to your care team through [email or web address] or call [phone].          -------------------------------------------------------------------------------------------------------------------  Please call our office at [phone] if you have any  of the following symptoms:   Fever of 100.5 or greater  Chills  Shortness of breath  Swelling or pain in one leg    After office hours an answering service is available and will contact a provider for emergencies or if you are experiencing any of the above symptoms.    Patient did express an interest in My Chart.  My Chart log in information explained on the after visit summary printout at the check-out desk.    Mare Dominguez RN     No visits with results within 3 Day(s) from this visit.   Latest known visit with results is:   Office Visit on 03/08/2024   Component Date Value Ref Range Status    WBC 03/08/2024 11.7 (H)  4.3 - 11.1 K/uL Final    RBC 03/08/2024 4.85  4.05 - 5.2 M/uL Final    Hemoglobin 03/08/2024 14.5  11.7 - 15.4 g/dL Final    Hematocrit 03/08/2024 44.9  35.8 - 46.3 % Final    MCV 03/08/2024 92.6  82 - 102 FL Final    MCH 03/08/2024 29.9  26.1 - 32.9 PG Final    MCHC 03/08/2024 32.3  31.4 - 35.0 g/dL Final    RDW 03/08/2024 13.3  11.9 - 14.6 % Final    Platelets 03/08/2024 361  150 - 450 K/uL Final    MPV 03/08/2024 8.3 (L)  9.4 - 12.3 FL Final    nRBC 03/08/2024 0.00  0.0 - 0.2 K/uL Final    **Note: Absolute NRBC parameter is now reported with Hemogram**    Differential Type 03/08/2024 AUTOMATED    Final    Neutrophils %

## 2024-04-10 ENCOUNTER — OFFICE VISIT (OUTPATIENT)
Dept: FAMILY MEDICINE CLINIC | Facility: CLINIC | Age: 64
End: 2024-04-10
Payer: MEDICARE

## 2024-04-10 VITALS
BODY MASS INDEX: 34.66 KG/M2 | TEMPERATURE: 97.5 F | DIASTOLIC BLOOD PRESSURE: 98 MMHG | SYSTOLIC BLOOD PRESSURE: 159 MMHG | OXYGEN SATURATION: 98 % | HEART RATE: 87 BPM | RESPIRATION RATE: 16 BRPM | HEIGHT: 64 IN | WEIGHT: 203 LBS

## 2024-04-10 DIAGNOSIS — Z00.00 MEDICARE ANNUAL WELLNESS VISIT, SUBSEQUENT: Primary | ICD-10-CM

## 2024-04-10 DIAGNOSIS — M19.90 ARTHRITIS: ICD-10-CM

## 2024-04-10 DIAGNOSIS — R41.3 MEMORY DIFFICULTY: ICD-10-CM

## 2024-04-10 DIAGNOSIS — H69.93 DYSFUNCTION OF BOTH EUSTACHIAN TUBES: ICD-10-CM

## 2024-04-10 DIAGNOSIS — F41.9 ANXIETY: ICD-10-CM

## 2024-04-10 DIAGNOSIS — E03.9 ACQUIRED HYPOTHYROIDISM: ICD-10-CM

## 2024-04-10 DIAGNOSIS — M48.062 LUMBAR STENOSIS WITH NEUROGENIC CLAUDICATION: ICD-10-CM

## 2024-04-10 DIAGNOSIS — M51.36 DDD (DEGENERATIVE DISC DISEASE), LUMBAR: ICD-10-CM

## 2024-04-10 DIAGNOSIS — R91.1 RIGHT LOWER LOBE PULMONARY NODULE: ICD-10-CM

## 2024-04-10 DIAGNOSIS — J45.20 MILD INTERMITTENT ASTHMA WITHOUT COMPLICATION: ICD-10-CM

## 2024-04-10 DIAGNOSIS — M25.551 RIGHT HIP PAIN: ICD-10-CM

## 2024-04-10 DIAGNOSIS — G40.909 SEIZURE DISORDER (HCC): ICD-10-CM

## 2024-04-10 DIAGNOSIS — M25.552 LEFT HIP PAIN: ICD-10-CM

## 2024-04-10 DIAGNOSIS — Z29.11 NEED FOR RSV IMMUNIZATION: ICD-10-CM

## 2024-04-10 DIAGNOSIS — G89.29 OTHER CHRONIC PAIN: ICD-10-CM

## 2024-04-10 DIAGNOSIS — Z12.31 SCREENING MAMMOGRAM FOR HIGH-RISK PATIENT: ICD-10-CM

## 2024-04-10 DIAGNOSIS — J41.0 SIMPLE CHRONIC BRONCHITIS (HCC): ICD-10-CM

## 2024-04-10 DIAGNOSIS — F33.42 RECURRENT MAJOR DEPRESSIVE DISORDER, IN FULL REMISSION (HCC): ICD-10-CM

## 2024-04-10 DIAGNOSIS — E78.2 MIXED HYPERLIPIDEMIA: ICD-10-CM

## 2024-04-10 PROBLEM — D05.11 DUCTAL CARCINOMA IN SITU (DCIS) OF RIGHT BREAST: Status: RESOLVED | Noted: 2017-01-17 | Resolved: 2024-04-10

## 2024-04-10 PROCEDURE — G0439 PPPS, SUBSEQ VISIT: HCPCS | Performed by: FAMILY MEDICINE

## 2024-04-10 PROCEDURE — G8427 DOCREV CUR MEDS BY ELIG CLIN: HCPCS | Performed by: FAMILY MEDICINE

## 2024-04-10 PROCEDURE — G8417 CALC BMI ABV UP PARAM F/U: HCPCS | Performed by: FAMILY MEDICINE

## 2024-04-10 PROCEDURE — 99214 OFFICE O/P EST MOD 30 MIN: CPT | Performed by: FAMILY MEDICINE

## 2024-04-10 PROCEDURE — 3023F SPIROM DOC REV: CPT | Performed by: FAMILY MEDICINE

## 2024-04-10 PROCEDURE — 3017F COLORECTAL CA SCREEN DOC REV: CPT | Performed by: FAMILY MEDICINE

## 2024-04-10 PROCEDURE — 1036F TOBACCO NON-USER: CPT | Performed by: FAMILY MEDICINE

## 2024-04-10 RX ORDER — MELOXICAM 7.5 MG/1
7.5 TABLET ORAL DAILY
Qty: 30 TABLET | Refills: 11 | Status: SHIPPED | OUTPATIENT
Start: 2024-04-10

## 2024-04-10 RX ORDER — FLUTICASONE PROPIONATE 50 MCG
2 SPRAY, SUSPENSION (ML) NASAL DAILY
Qty: 16 G | Refills: 5 | Status: SHIPPED | OUTPATIENT
Start: 2024-04-10

## 2024-04-10 RX ORDER — LEVOTHYROXINE SODIUM 112 UG/1
112 TABLET ORAL DAILY
Qty: 90 TABLET | Refills: 0 | Status: CANCELLED | OUTPATIENT
Start: 2024-04-10

## 2024-04-10 SDOH — ECONOMIC STABILITY: FOOD INSECURITY: WITHIN THE PAST 12 MONTHS, THE FOOD YOU BOUGHT JUST DIDN'T LAST AND YOU DIDN'T HAVE MONEY TO GET MORE.: NEVER TRUE

## 2024-04-10 SDOH — ECONOMIC STABILITY: FOOD INSECURITY: WITHIN THE PAST 12 MONTHS, YOU WORRIED THAT YOUR FOOD WOULD RUN OUT BEFORE YOU GOT MONEY TO BUY MORE.: NEVER TRUE

## 2024-04-10 SDOH — ECONOMIC STABILITY: INCOME INSECURITY: HOW HARD IS IT FOR YOU TO PAY FOR THE VERY BASICS LIKE FOOD, HOUSING, MEDICAL CARE, AND HEATING?: NOT HARD AT ALL

## 2024-04-10 NOTE — PATIENT INSTRUCTIONS
class. If you have questions, ask your doctor about seeing a registered dietitian or an exercise specialist. You might also think about joining a weight-loss support group.  If you're not ready to make changes right now, try to pick a date in the future. Then make an appointment with your doctor to talk about when and how you'll get started with a plan.  Follow-up care is a key part of your treatment and safety. Be sure to make and go to all appointments, and call your doctor if you are having problems. It's also a good idea to know your test results and keep a list of the medicines you take.  How can you care for yourself at home?  Set realistic goals. Many people expect to lose much more weight than is likely. A weight loss of 5% to 10% of your body weight may be enough to improve your health.  Get family and friends involved to provide support. Talk to them about why you are trying to lose weight, and ask them to help. They can help by participating in exercise and having meals with you, even if they may be eating something different.  Find what works best for you. If you do not have time or do not like to cook, a program that offers meal replacement bars or shakes may be better for you. Or if you like to prepare meals, finding a plan that includes daily menus and recipes may be best.  Ask your doctor about other health professionals who can help you achieve your weight loss goals.  A dietitian can help you make healthy changes in your diet.  An exercise specialist or  can help you develop a safe and effective exercise program.  A counselor or psychiatrist can help you cope with issues such as depression, anxiety, or family problems that can make it hard to focus on weight loss.  Consider joining a support group for people who are trying to lose weight. Your doctor can suggest groups in your area.  Where can you learn more?  Go to https://www.healthwise.net/patientEd and enter U357 to learn more

## 2024-04-10 NOTE — PROGRESS NOTES
Medicare Annual Wellness Visit    Polly Delong is here for Medicare AWV    Assessment & Plan   Medicare annual wellness visit, subsequent  Dysfunction of both eustachian tubes  -     fluticasone (FLONASE) 50 MCG/ACT nasal spray; 2 sprays by Each Nostril route daily, Disp-16 g, R-5Normal  Acquired hypothyroidism  Simple chronic bronchitis (HCC)  Mild intermittent asthma without complication  Seizure disorder (HCC)  DDD (degenerative disc disease), lumbar  Arthritis  Lumbar stenosis with neurogenic claudication  Recurrent major depressive disorder, in full remission (HCC)  Anxiety  Other chronic pain  Memory difficulty  Need for RSV immunization  -     respiratory syncytial vaccine, adjuvanted (AREXVY) 120 MCG/0.5ML injection; Inject 0.5 mLs into the muscle once for 1 dose, Disp-0.5 mL, R-0Normal  Right lower lobe pulmonary nodule  -     CT CHEST WO CONTRAST; Future  Screening mammogram for high-risk patient  -     MARCOS DIGITAL SCREEN W OR WO CAD BILATERAL; Future  Right hip pain  -     XR HIP 2-3 VW W PELVIS RIGHT; Future  -     meloxicam (MOBIC) 7.5 MG tablet; Take 1 tablet by mouth daily, Disp-30 tablet, R-11Normal  Left hip pain  -     XR HIP LEFT (2-3 VIEWS); Future  -     meloxicam (MOBIC) 7.5 MG tablet; Take 1 tablet by mouth daily, Disp-30 tablet, R-11Normal  Mixed hyperlipidemia    Recommendations for Preventive Services Due: see orders and patient instructions/AVS.  Recommended screening schedule for the next 5-10 years is provided to the patient in written form: see Patient Instructions/AVS.     Return in about 1 year (around 4/10/2025).     Subjective   The following acute and/or chronic problems were also addressed today:    Continues to have problems with chronic low back pain.  She is on disability for this.  She walks with a cane. V     She says the new thing is that she has noticed pain in the front of her groin area when she steps and she has never had that before.  Sometimes it will be sharp

## 2024-04-25 ENCOUNTER — HOSPITAL ENCOUNTER (OUTPATIENT)
Dept: CT IMAGING | Age: 64
Discharge: HOME | End: 2024-04-25
Attending: FAMILY MEDICINE
Payer: MEDICARE

## 2024-04-25 DIAGNOSIS — R91.1 RIGHT LOWER LOBE PULMONARY NODULE: ICD-10-CM

## 2024-04-25 PROCEDURE — 71250 CT THORAX DX C-: CPT

## 2024-04-29 NOTE — RESULT ENCOUNTER NOTE
Let the patient know that the CT scan was normal, the previously seen lung nodule is no longer there.  Will copy to Dr. Aiken to review

## 2024-05-15 DIAGNOSIS — R05.1 ACUTE COUGH: ICD-10-CM

## 2024-05-15 DIAGNOSIS — J45.20 MILD INTERMITTENT ASTHMA WITHOUT COMPLICATION: ICD-10-CM

## 2024-05-15 DIAGNOSIS — J41.0 SIMPLE CHRONIC BRONCHITIS (HCC): ICD-10-CM

## 2024-05-16 RX ORDER — ALBUTEROL SULFATE 90 UG/1
2 AEROSOL, METERED RESPIRATORY (INHALATION) 4 TIMES DAILY
Qty: 18 G | Refills: 11 | Status: SHIPPED | OUTPATIENT
Start: 2024-05-16

## 2024-06-10 ENCOUNTER — NURSE ONLY (OUTPATIENT)
Dept: FAMILY MEDICINE CLINIC | Facility: CLINIC | Age: 64
End: 2024-06-10

## 2024-06-10 DIAGNOSIS — E03.9 ACQUIRED HYPOTHYROIDISM: ICD-10-CM

## 2024-06-10 LAB — TSH, 3RD GENERATION: 1.58 UIU/ML (ref 0.27–4.2)

## 2024-06-11 DIAGNOSIS — E03.9 ACQUIRED HYPOTHYROIDISM: ICD-10-CM

## 2024-06-11 RX ORDER — LEVOTHYROXINE SODIUM 112 UG/1
112 TABLET ORAL DAILY
Qty: 90 TABLET | Refills: 3 | Status: SHIPPED | OUTPATIENT
Start: 2024-06-11

## 2024-06-11 NOTE — TELEPHONE ENCOUNTER
Medication Refill Request      Name of Medication : Levothyroxine       Strength of Medication: 112 mcg       Directions: 1 daily      30 day or 90 day supply: 90 day supply       [facility]      Additional Information For Provider: Patient had labs done 06/10/2024  was sent a result as normal

## 2024-07-14 SDOH — HEALTH STABILITY: PHYSICAL HEALTH: ON AVERAGE, HOW MANY DAYS PER WEEK DO YOU ENGAGE IN MODERATE TO STRENUOUS EXERCISE (LIKE A BRISK WALK)?: 0 DAYS

## 2024-07-14 ASSESSMENT — LIFESTYLE VARIABLES
HOW MANY STANDARD DRINKS CONTAINING ALCOHOL DO YOU HAVE ON A TYPICAL DAY: PATIENT DOES NOT DRINK
HOW OFTEN DO YOU HAVE A DRINK CONTAINING ALCOHOL: 1
HOW MANY STANDARD DRINKS CONTAINING ALCOHOL DO YOU HAVE ON A TYPICAL DAY: 0
HOW OFTEN DO YOU HAVE SIX OR MORE DRINKS ON ONE OCCASION: 1
HOW OFTEN DO YOU HAVE A DRINK CONTAINING ALCOHOL: NEVER

## 2024-07-14 ASSESSMENT — PATIENT HEALTH QUESTIONNAIRE - PHQ9
SUM OF ALL RESPONSES TO PHQ QUESTIONS 1-9: 2
2. FEELING DOWN, DEPRESSED OR HOPELESS: SEVERAL DAYS
SUM OF ALL RESPONSES TO PHQ QUESTIONS 1-9: 2
1. LITTLE INTEREST OR PLEASURE IN DOING THINGS: SEVERAL DAYS
SUM OF ALL RESPONSES TO PHQ9 QUESTIONS 1 & 2: 2

## 2024-07-15 ENCOUNTER — OFFICE VISIT (OUTPATIENT)
Dept: FAMILY MEDICINE CLINIC | Facility: CLINIC | Age: 64
End: 2024-07-15
Payer: MEDICARE

## 2024-07-15 VITALS
HEIGHT: 64 IN | WEIGHT: 188 LBS | BODY MASS INDEX: 32.1 KG/M2 | HEART RATE: 96 BPM | OXYGEN SATURATION: 98 % | TEMPERATURE: 97.5 F | SYSTOLIC BLOOD PRESSURE: 109 MMHG | RESPIRATION RATE: 16 BRPM | DIASTOLIC BLOOD PRESSURE: 62 MMHG

## 2024-07-15 DIAGNOSIS — G89.29 OTHER CHRONIC PAIN: ICD-10-CM

## 2024-07-15 DIAGNOSIS — M48.062 LUMBAR STENOSIS WITH NEUROGENIC CLAUDICATION: Primary | ICD-10-CM

## 2024-07-15 DIAGNOSIS — E03.9 ACQUIRED HYPOTHYROIDISM: ICD-10-CM

## 2024-07-15 PROCEDURE — G8417 CALC BMI ABV UP PARAM F/U: HCPCS | Performed by: FAMILY MEDICINE

## 2024-07-15 PROCEDURE — 1036F TOBACCO NON-USER: CPT | Performed by: FAMILY MEDICINE

## 2024-07-15 PROCEDURE — 3017F COLORECTAL CA SCREEN DOC REV: CPT | Performed by: FAMILY MEDICINE

## 2024-07-15 PROCEDURE — 99214 OFFICE O/P EST MOD 30 MIN: CPT | Performed by: FAMILY MEDICINE

## 2024-07-15 PROCEDURE — G8427 DOCREV CUR MEDS BY ELIG CLIN: HCPCS | Performed by: FAMILY MEDICINE

## 2024-07-15 RX ORDER — LEVOTHYROXINE SODIUM 112 UG/1
112 TABLET ORAL DAILY
Qty: 90 TABLET | Refills: 3 | Status: SHIPPED | OUTPATIENT
Start: 2024-07-15

## 2024-07-15 ASSESSMENT — PATIENT HEALTH QUESTIONNAIRE - PHQ9
SUM OF ALL RESPONSES TO PHQ QUESTIONS 1-9: 16
SUM OF ALL RESPONSES TO PHQ QUESTIONS 1-9: 16
8. MOVING OR SPEAKING SO SLOWLY THAT OTHER PEOPLE COULD HAVE NOTICED. OR THE OPPOSITE, BEING SO FIGETY OR RESTLESS THAT YOU HAVE BEEN MOVING AROUND A LOT MORE THAN USUAL: MORE THAN HALF THE DAYS
10. IF YOU CHECKED OFF ANY PROBLEMS, HOW DIFFICULT HAVE THESE PROBLEMS MADE IT FOR YOU TO DO YOUR WORK, TAKE CARE OF THINGS AT HOME, OR GET ALONG WITH OTHER PEOPLE: SOMEWHAT DIFFICULT
9. THOUGHTS THAT YOU WOULD BE BETTER OFF DEAD, OR OF HURTING YOURSELF: NOT AT ALL
4. FEELING TIRED OR HAVING LITTLE ENERGY: NEARLY EVERY DAY
5. POOR APPETITE OR OVEREATING: MORE THAN HALF THE DAYS
SUM OF ALL RESPONSES TO PHQ9 QUESTIONS 1 & 2: 3
3. TROUBLE FALLING OR STAYING ASLEEP: MORE THAN HALF THE DAYS
7. TROUBLE CONCENTRATING ON THINGS, SUCH AS READING THE NEWSPAPER OR WATCHING TELEVISION: MORE THAN HALF THE DAYS
2. FEELING DOWN, DEPRESSED OR HOPELESS: MORE THAN HALF THE DAYS
SUM OF ALL RESPONSES TO PHQ QUESTIONS 1-9: 16
1. LITTLE INTEREST OR PLEASURE IN DOING THINGS: SEVERAL DAYS
6. FEELING BAD ABOUT YOURSELF - OR THAT YOU ARE A FAILURE OR HAVE LET YOURSELF OR YOUR FAMILY DOWN: MORE THAN HALF THE DAYS
SUM OF ALL RESPONSES TO PHQ QUESTIONS 1-9: 16

## 2024-07-15 NOTE — PROGRESS NOTES
[facility] [address] [address]  Phone: [phone] Fax [phone]  Padmini Aiken MD  7/15/2024           Ms. Delong  is a 63 y.o.  year old  female patient who comes in for follow up.  Her thyroid blood work is out of adjustment and we had her get back on her thyroid medicine and she came back in today to see what she needs to do about that.  She also says that she is hurting in her groin.  Her hip x-rays were normal.  She does have chronic problems with her back.  It has been a long time since she is seeing pain management about her back.  It really does hurt her.  She has had multiple spine surgeries.  Meloxicam really has not helped much.    She does see psychiatry and is on Cymbalta.  She has follow-up with them in 1 month.  She says she does not think she is getting better with the Cymbalta.  No thoughts of suicide though.        7/15/2024     2:25 PM   PHQ-9    Little interest or pleasure in doing things 1   Feeling down, depressed, or hopeless 2   Trouble falling or staying asleep, or sleeping too much 2   Feeling tired or having little energy 3   Poor appetite or overeating 2   Feeling bad about yourself - or that you are a failure or have let yourself or your family down 2   Trouble concentrating on things, such as reading the newspaper or watching television 2   Moving or speaking so slowly that other people could have noticed. Or the opposite - being so fidgety or restless that you have been moving around a lot more than usual 2   Thoughts that you would be better off dead, or of hurting yourself in some way 0   PHQ-2 Score 3   PHQ-9 Total Score 16   If you checked off any problems, how difficult have these problems made it for you to do your work, take care of things at home, or get along with other people? 1          Ms. Delong  has  has a past medical history of Anxiety, Arthritis, Atypical ductal hyperplasia of right breast, Breast cancer (HCC),

## 2024-07-17 ENCOUNTER — TELEPHONE (OUTPATIENT)
Dept: FAMILY MEDICINE CLINIC | Facility: CLINIC | Age: 64
End: 2024-07-17

## 2024-07-17 NOTE — TELEPHONE ENCOUNTER
----- Message from Yamil Chapman sent at 7/17/2024  9:36 AM EDT -----  Regarding: [facility] Referral Request  [facility] Referral Request    Reason for referral request: Lab/Test Order    Specialist/Lab/Test patient is requesting (if known):MRI    Specialist Phone Number (if applicable):    Additional Information patient need an update order for a MRI  --------------------------------------------------------------------------------------------------------------------------    Relationship to Patient: Self     Call Back Information: OK to leave message on voicemail  Preferred Call Back Number: Phone [phone]

## 2025-01-07 NOTE — H&P
Thierry Massed    2/16/2017    Date of Admission:  2/16/2017      Subjective:     Patient is a 64 y.o.  female who had a surgical biopsy on the right breast for ADH. The pathology specimen shows DCIS with a positive superior margin   . Patient Active Problem List    Diagnosis Date Noted    Anxiety     Chronic obstructive pulmonary disease ([address]     Chronic pain     DDD (degenerative disc disease), lumbar     Insomnia     Other hyperalimentation     Arthritis     Stomach ulcer     Lumbar radiculopathy     Ductal carcinoma in situ (DCIS) of right breast 01/17/2017    Recurrent major depressive disorder, in full remission ([address] 04/27/2016    Lumbar stenosis with neurogenic claudication 06/11/2015    Seizure disorder ([address]     Pure hypercholesterolemia     Memory difficulty     Myopathy     Mixed hyperlipidemia     Asthma     Lumbago      Past Medical History   Diagnosis Date    Anxiety      daily meds    Arthritis     Atypical ductal hyperplasia of right breast 12/12/2016    Cancer [facility])      right breast    Carpal tunnel syndrome      right     Chronic obstructive pulmonary disease (HCC)      does not have inhalers at this time     Chronic pain     Claustrophobia     DDD (degenerative disc disease), lumbar      pt denies    Ductal carcinoma in situ (DCIS) of right breast 1/17/2017     Seeing Dr. Rk Abdi.     History of cigar smoking     History of URI (upper respiratory infection)     History of vitamin D deficiency     Insomnia     Lateral epicondylitis  of elbow     Lumbago     Lumbar radiculopathy     Lumbar stenosis with neurogenic claudication 6/11/2015    Memory difficulty     Mixed hyperlipidemia     Myopathy     Neuroma of foot      right     Nicotine vapor product user     Other hyperalimentation     Papule     Poor historian     Pure hypercholesterolemia      diet controlled     Recurrent major depressive disorder, in full remission ([facility] 4/27/2016 Sees Dr. Renetta Sims. H/o cutting. Therapist Frank Nash.  Seizure disorder ([address]      last seizure -last grand mal few years ago -from blow to head in the past; managed with medication; followed by Dr Raleigh eJan (neuro)    Stomach ulcer      years ago    Tobacco abuse       Past Surgical History   Procedure Laterality Date    Hx cyst removal Right      breast     Hx carpal tunnel release Bilateral     Hx cyst removal       polyps in throat     Hx colonoscopy  04/2010    Lavell biopsy breast stereotactic Right 11/28/2016    Hx bunionectomy Left      as well as other procedures- bone spur    Hx tubal ligation      Hx back surgery  6/15 latest     x4: 2 ruptured one was sciatic nerve; sees Dr. Estella Willson Hx back surgery  11/2016     dr Nilesh Banda Hx breast biopsy Bilateral      benign    Hx breast lumpectomy Right 1/6/2017     RIGHT BREAST LUMPECTOMY performed by Brandi Ball MD at [address] [place] [address] Hx breast biopsy Right 1/6/2017     RIGHT BREAST NEEDLE LOCALIZED BIOPSY/ ARRIVE @ 1000 TO PREOP/ BREAST CENTER @ 1130 FOR RIGHT MAMMO NEEDLE LOC performed by Brandi Ball MD at [address] [place] [address] Hx heent       cyst and polyps from vocal cords      Prior to Admission Medications   Prescriptions Last Dose Informant Patient Reported? Taking? AMBIEN CR 12.5 mg TbMP 2/14/2017 at Unknown time  Yes Yes   Sig: Take 12.5 mg by mouth nightly as needed. cyclobenzaprine (FLEXERIL) 5 mg tablet Unknown at Unknown time  No No   Sig: Take 2 Tabs by mouth nightly. Patient taking differently: Take 10 mg by mouth nightly. prn   escitalopram oxalate (LEXAPRO) 20 mg tablet 2/16/2017 at Unknown time  Yes Yes   Sig: Take 20 mg by mouth every morning. lacosamide (VIMPAT) 200 mg tab tablet 2/16/2017 at Unknown time  Yes Yes   Sig: Take 200 mg by mouth two (2) times a day. naproxen na-pseudoephedrine (ALEVE-D SINUS AND COLD) 220-120 mg Tb12 Unknown at Unknown time  Yes No   Sig: Take  by mouth as needed.  Last taken 2/14/17 traZODone (DESYREL) 50 mg tablet 2/15/2017 at Unknown time  Yes Yes   Sig: Take 200 mg by mouth nightly. Facility-Administered Medications: None     Allergies   Allergen Reactions    Levaquin [Levofloxacin] Rash    Lyrica [Pregabalin] Swelling    Sulfa (Sulfonamide Antibiotics) Itching and Nausea Only      Social History   Substance Use Topics    Smoking status: Current Every Day Smoker     Packs/day: 0.50     Years: 40.00    Smokeless tobacco: Never Used      Comment: was using e-cig but started tobacco again in oct with father passing    Alcohol use No      Social History     Social History Narrative     Family History   Problem Relation Age of Onset    Cancer Brother      Esophageal    Hypertension Mother     Heart Disease Father     Pacemaker Father     Hypertension Father     Alcohol abuse Father     Lung Disease Father      copd    Diabetes Brother     Stroke Brother         Current Facility-Administered Medications   Medication Dose Route Frequency    ceFAZolin in 0.9% NS (ANCEF) IVPB soln 2 g  2 g IntraVENous ONCE    lidocaine (XYLOCAINE) 10 mg/mL (1 %) injection 0.1 mL  0.1 mL SubCUTAneous PRN    lactated ringers infusion  100 mL/hr IntraVENous CONTINUOUS    0.9% sodium chloride infusion  50 mL/hr IntraVENous CONTINUOUS    sodium chloride (NS) flush 5-10 mL  5-10 mL IntraVENous Q8H    sodium chloride (NS) flush 5-10 mL  5-10 mL IntraVENous PRN    famotidine (PEPCID) tablet 20 mg  20 mg Oral ONCE    fentaNYL citrate (PF) injection 25 mcg  25 mcg IntraVENous ONCE    midazolam (VERSED) injection 2 mg  2 mg IntraVENous ONCE PRN    midazolam (VERSED) injection 2 mg  2 mg IntraVENous ONCE       Review of Systems  Pertinent items are noted in HPI. Objective: There were no vitals filed for this visit.   PHYSICAL EXAM     Gen- the patient is well developed and in no acute distress  HEENT- PERRL, EOMI, no scleral icterus       nose without alar flaring or epistaxis oral muscosa moist without cyanosis  Neck- no JVD or retractions  Lungs- clear  Heart- RRR without M,G,R  Abd- soft and non-tender; with positive bowel sounds. Ext- warm without cyanosis. There is no lower leg edema. Skin- no jaundice or rashes, no wounds   Neuro- alert and oriented x 3. No gross sensorimotor deficits are present. No results for input(s): WBC, HGB, HCT, PLT, HGBEXT, HCTEXT, PLTEXT in the last 72 hours. No results for input(s): NA, K, CL, GLU, CO2, BUN, CREA, MG, PHOS, TROIQ, INR, BNPP in the last 72 hours. No lab exists for component: TROIP, INREXT  No results for input(s): PH, PCO2, PO2, HCO3 in the last 72 hours. Assessment:     dcis right breast  Plan:     Pt will have re- excision of the superior margin from the right breast lumpectomy bed to ensure no remaining DCIS is present   I have discussed the risks, benefits and alternatives of surgery. Pt understands and wishes to proceed.   Consent obtained         Linnette Boateng MD Home

## 2025-01-12 SDOH — ECONOMIC STABILITY: INCOME INSECURITY: IN THE LAST 12 MONTHS, WAS THERE A TIME WHEN YOU WERE NOT ABLE TO PAY THE MORTGAGE OR RENT ON TIME?: NO

## 2025-01-12 SDOH — ECONOMIC STABILITY: FOOD INSECURITY: WITHIN THE PAST 12 MONTHS, THE FOOD YOU BOUGHT JUST DIDN'T LAST AND YOU DIDN'T HAVE MONEY TO GET MORE.: NEVER TRUE

## 2025-01-12 SDOH — ECONOMIC STABILITY: TRANSPORTATION INSECURITY
IN THE PAST 12 MONTHS, HAS THE LACK OF TRANSPORTATION KEPT YOU FROM MEDICAL APPOINTMENTS OR FROM GETTING MEDICATIONS?: NO

## 2025-01-12 SDOH — ECONOMIC STABILITY: FOOD INSECURITY: WITHIN THE PAST 12 MONTHS, YOU WORRIED THAT YOUR FOOD WOULD RUN OUT BEFORE YOU GOT MONEY TO BUY MORE.: NEVER TRUE

## 2025-01-12 SDOH — ECONOMIC STABILITY: TRANSPORTATION INSECURITY
IN THE PAST 12 MONTHS, HAS LACK OF TRANSPORTATION KEPT YOU FROM MEETINGS, WORK, OR FROM GETTING THINGS NEEDED FOR DAILY LIVING?: NO

## 2025-01-12 ASSESSMENT — PATIENT HEALTH QUESTIONNAIRE - PHQ9
SUM OF ALL RESPONSES TO PHQ QUESTIONS 1-9: 14
SUM OF ALL RESPONSES TO PHQ QUESTIONS 1-9: 14
4. FEELING TIRED OR HAVING LITTLE ENERGY: NEARLY EVERY DAY
6. FEELING BAD ABOUT YOURSELF - OR THAT YOU ARE A FAILURE OR HAVE LET YOURSELF OR YOUR FAMILY DOWN: SEVERAL DAYS
10. IF YOU CHECKED OFF ANY PROBLEMS, HOW DIFFICULT HAVE THESE PROBLEMS MADE IT FOR YOU TO DO YOUR WORK, TAKE CARE OF THINGS AT HOME, OR GET ALONG WITH OTHER PEOPLE: SOMEWHAT DIFFICULT
5. POOR APPETITE OR OVEREATING: MORE THAN HALF THE DAYS
1. LITTLE INTEREST OR PLEASURE IN DOING THINGS: MORE THAN HALF THE DAYS
2. FEELING DOWN, DEPRESSED OR HOPELESS: MORE THAN HALF THE DAYS
4. FEELING TIRED OR HAVING LITTLE ENERGY: NEARLY EVERY DAY
8. MOVING OR SPEAKING SO SLOWLY THAT OTHER PEOPLE COULD HAVE NOTICED. OR THE OPPOSITE - BEING SO FIDGETY OR RESTLESS THAT YOU HAVE BEEN MOVING AROUND A LOT MORE THAN USUAL: SEVERAL DAYS
5. POOR APPETITE OR OVEREATING: MORE THAN HALF THE DAYS
6. FEELING BAD ABOUT YOURSELF - OR THAT YOU ARE A FAILURE OR HAVE LET YOURSELF OR YOUR FAMILY DOWN: SEVERAL DAYS
1. LITTLE INTEREST OR PLEASURE IN DOING THINGS: MORE THAN HALF THE DAYS
3. TROUBLE FALLING OR STAYING ASLEEP: SEVERAL DAYS
8. MOVING OR SPEAKING SO SLOWLY THAT OTHER PEOPLE COULD HAVE NOTICED. OR THE OPPOSITE, BEING SO FIGETY OR RESTLESS THAT YOU HAVE BEEN MOVING AROUND A LOT MORE THAN USUAL: SEVERAL DAYS
SUM OF ALL RESPONSES TO PHQ QUESTIONS 1-9: 14
SUM OF ALL RESPONSES TO PHQ QUESTIONS 1-9: 14
7. TROUBLE CONCENTRATING ON THINGS, SUCH AS READING THE NEWSPAPER OR WATCHING TELEVISION: MORE THAN HALF THE DAYS
2. FEELING DOWN, DEPRESSED OR HOPELESS: MORE THAN HALF THE DAYS
10. IF YOU CHECKED OFF ANY PROBLEMS, HOW DIFFICULT HAVE THESE PROBLEMS MADE IT FOR YOU TO DO YOUR WORK, TAKE CARE OF THINGS AT HOME, OR GET ALONG WITH OTHER PEOPLE: SOMEWHAT DIFFICULT
9. THOUGHTS THAT YOU WOULD BE BETTER OFF DEAD, OR OF HURTING YOURSELF: NOT AT ALL
SUM OF ALL RESPONSES TO PHQ9 QUESTIONS 1 & 2: 4
7. TROUBLE CONCENTRATING ON THINGS, SUCH AS READING THE NEWSPAPER OR WATCHING TELEVISION: MORE THAN HALF THE DAYS
SUM OF ALL RESPONSES TO PHQ QUESTIONS 1-9: 14
9. THOUGHTS THAT YOU WOULD BE BETTER OFF DEAD, OR OF HURTING YOURSELF: NOT AT ALL
3. TROUBLE FALLING OR STAYING ASLEEP: SEVERAL DAYS

## 2025-01-15 ENCOUNTER — OFFICE VISIT (OUTPATIENT)
Dept: FAMILY MEDICINE CLINIC | Facility: CLINIC | Age: 65
End: 2025-01-15

## 2025-01-15 VITALS
WEIGHT: 193 LBS | BODY MASS INDEX: 32.95 KG/M2 | HEART RATE: 99 BPM | RESPIRATION RATE: 18 BRPM | DIASTOLIC BLOOD PRESSURE: 87 MMHG | TEMPERATURE: 97.9 F | HEIGHT: 64 IN | OXYGEN SATURATION: 98 % | SYSTOLIC BLOOD PRESSURE: 122 MMHG

## 2025-01-15 DIAGNOSIS — F33.42 RECURRENT MAJOR DEPRESSIVE DISORDER, IN FULL REMISSION (HCC): ICD-10-CM

## 2025-01-15 DIAGNOSIS — Z79.899 ENCOUNTER FOR LONG-TERM (CURRENT) USE OF MEDICATIONS: ICD-10-CM

## 2025-01-15 DIAGNOSIS — E78.2 MIXED HYPERLIPIDEMIA: ICD-10-CM

## 2025-01-15 DIAGNOSIS — Z72.0 TOBACCO ABUSE: ICD-10-CM

## 2025-01-15 DIAGNOSIS — E03.9 ACQUIRED HYPOTHYROIDISM: ICD-10-CM

## 2025-01-15 DIAGNOSIS — E03.9 ACQUIRED HYPOTHYROIDISM: Primary | ICD-10-CM

## 2025-01-15 DIAGNOSIS — E78.00 HIGH CHOLESTEROL: ICD-10-CM

## 2025-01-15 LAB
ALBUMIN SERPL-MCNC: 3.9 G/DL (ref 3.2–4.6)
ALBUMIN/GLOB SERPL: 1.3 (ref 1–1.9)
ALP SERPL-CCNC: 120 U/L (ref 35–104)
ALT SERPL-CCNC: 15 U/L (ref 8–45)
ANION GAP SERPL CALC-SCNC: 12 MMOL/L (ref 7–16)
AST SERPL-CCNC: 19 U/L (ref 15–37)
BASOPHILS # BLD: 0.1 K/UL (ref 0–0.2)
BASOPHILS NFR BLD: 0.7 % (ref 0–2)
BILIRUB SERPL-MCNC: <0.2 MG/DL (ref 0–1.2)
BUN SERPL-MCNC: 22 MG/DL (ref 8–23)
CALCIUM SERPL-MCNC: 8.9 MG/DL (ref 8.8–10.2)
CHLORIDE SERPL-SCNC: 106 MMOL/L (ref 98–107)
CHOLEST SERPL-MCNC: 256 MG/DL (ref 0–200)
CO2 SERPL-SCNC: 21 MMOL/L (ref 20–29)
CREAT SERPL-MCNC: 0.86 MG/DL (ref 0.6–1.1)
DIFFERENTIAL METHOD BLD: ABNORMAL
EOSINOPHIL # BLD: 0.16 K/UL (ref 0–0.8)
EOSINOPHIL NFR BLD: 1.2 % (ref 0.5–7.8)
ERYTHROCYTE [DISTWIDTH] IN BLOOD BY AUTOMATED COUNT: 12.7 % (ref 11.9–14.6)
GLOBULIN SER CALC-MCNC: 3.1 G/DL (ref 2.3–3.5)
GLUCOSE SERPL-MCNC: 91 MG/DL (ref 70–99)
HCT VFR BLD AUTO: 44 % (ref 35.8–46.3)
HDLC SERPL-MCNC: 43 MG/DL (ref 40–60)
HDLC SERPL: 6 (ref 0–5)
HGB BLD-MCNC: 14.5 G/DL (ref 11.7–15.4)
IMM GRANULOCYTES # BLD AUTO: 0.07 K/UL (ref 0–0.5)
IMM GRANULOCYTES NFR BLD AUTO: 0.5 % (ref 0–5)
LDLC SERPL CALC-MCNC: 169 MG/DL (ref 0–100)
LYMPHOCYTES # BLD: 3.34 K/UL (ref 0.5–4.6)
LYMPHOCYTES NFR BLD: 24.9 % (ref 13–44)
MCH RBC QN AUTO: 30.7 PG (ref 26.1–32.9)
MCHC RBC AUTO-ENTMCNC: 33 G/DL (ref 31.4–35)
MCV RBC AUTO: 93.2 FL (ref 82–102)
MONOCYTES # BLD: 0.91 K/UL (ref 0.1–1.3)
MONOCYTES NFR BLD: 6.8 % (ref 4–12)
NEUTS SEG # BLD: 8.81 K/UL (ref 1.7–8.2)
NEUTS SEG NFR BLD: 65.9 % (ref 43–78)
NRBC # BLD: 0 K/UL (ref 0–0.2)
PLATELET # BLD AUTO: 373 K/UL (ref 150–450)
PMV BLD AUTO: 8.3 FL (ref 9.4–12.3)
POTASSIUM SERPL-SCNC: 4 MMOL/L (ref 3.5–5.1)
PROT SERPL-MCNC: 7 G/DL (ref 6.3–8.2)
RBC # BLD AUTO: 4.72 M/UL (ref 4.05–5.2)
SODIUM SERPL-SCNC: 139 MMOL/L (ref 136–145)
TRIGL SERPL-MCNC: 220 MG/DL (ref 0–150)
TSH, 3RD GENERATION: 4.05 UIU/ML (ref 0.27–4.2)
VLDLC SERPL CALC-MCNC: 44 MG/DL (ref 6–23)
WBC # BLD AUTO: 13.4 K/UL (ref 4.3–11.1)

## 2025-01-15 RX ORDER — VARENICLINE TARTRATE 0.5 MG/1
TABLET, FILM COATED ORAL
Qty: 10 TABLET | Refills: 0 | Status: SHIPPED | OUTPATIENT
Start: 2025-01-15

## 2025-01-15 RX ORDER — ROSUVASTATIN CALCIUM 10 MG/1
10 TABLET, COATED ORAL NIGHTLY
Qty: 90 TABLET | Refills: 3 | Status: SHIPPED | OUTPATIENT
Start: 2025-01-15

## 2025-01-15 RX ORDER — VARENICLINE TARTRATE 1 MG/1
TABLET, FILM COATED ORAL
Qty: 60 TABLET | Refills: 5 | Status: CANCELLED | OUTPATIENT
Start: 2025-01-15

## 2025-01-15 NOTE — PROGRESS NOTES
[facility] [place] [place] [address]  Phone: [phone] Fax [phone]  Padmini Aiken MD  1/15/2025           Ms. Delong  is a 64 y.o.  year old  female patient who comes in to follow-up on chronic medical issues.  She does have hypothyroidism and maintains on Synthroid 112 mcg daily.  She is due for blood work.    She does have high cholesterol and maintains on Crestor 10 mg daily.  She is due for blood work for this as well.    She does continue to smoke so she has cut down.  She would like to get some of the smaller doses of the Chantix because she has plenty of the 1 mg and would like to try it again.    She does struggle with anxiety and depression and is down and blue them is thoughts of suicide.  She does see psychiatry.  She would consider going for counseling.      She does see psychiatry and is on Cymbalta.     Ms. Delong  has  has a past medical history of Anxiety, Arthritis, Atypical ductal hyperplasia of right breast, Breast cancer (HCC), Cancer (HCC), Carpal tunnel syndrome, Chronic obstructive pulmonary disease (HCC), Chronic pain, Claustrophobia, DDD (degenerative disc disease), lumbar, Ductal carcinoma in situ (DCIS) of right breast, GERD (gastroesophageal reflux disease), History of cigar smoking, History of peptic ulcer, History of URI (upper respiratory infection), History of vitamin D deficiency, Insomnia, Lateral epicondylitis  of elbow, Lumbago, Lumbar radiculopathy, Lumbar stenosis with neurogenic claudication, Memory difficulty, Mixed hyperlipidemia, Myopathy, Neuroma of foot, Nicotine vapor product user, Obesity (BMI 30.0-34.9), Other hyperalimentation, Papule, Poor historian, Pure hypercholesterolemia, Recurrent major depressive disorder, in full remission (HCC), Seizure disorder (HCC), Seizure disorder (HCC), Smoker, and Tobacco abuse.    Ms. Delong  has  has a past surgical history that includes orthopedic surgery (2016); cyst removal

## 2025-01-22 ENCOUNTER — LAB (OUTPATIENT)
Dept: FAMILY MEDICINE CLINIC | Facility: CLINIC | Age: 65
End: 2025-01-22

## 2025-01-22 DIAGNOSIS — E78.2 MIXED HYPERLIPIDEMIA: ICD-10-CM

## 2025-01-22 DIAGNOSIS — Z79.899 ENCOUNTER FOR LONG-TERM (CURRENT) USE OF MEDICATIONS: ICD-10-CM

## 2025-01-22 LAB
BASOPHILS # BLD: 0.08 K/UL (ref 0–0.2)
BASOPHILS NFR BLD: 0.7 % (ref 0–2)
CHOLEST SERPL-MCNC: 168 MG/DL (ref 0–200)
DIFFERENTIAL METHOD BLD: ABNORMAL
EOSINOPHIL # BLD: 0.15 K/UL (ref 0–0.8)
EOSINOPHIL NFR BLD: 1.4 % (ref 0.5–7.8)
ERYTHROCYTE [DISTWIDTH] IN BLOOD BY AUTOMATED COUNT: 12.9 % (ref 11.9–14.6)
HCT VFR BLD AUTO: 47.1 % (ref 35.8–46.3)
HDLC SERPL-MCNC: 42 MG/DL (ref 40–60)
HDLC SERPL: 4 (ref 0–5)
HGB BLD-MCNC: 15.2 G/DL (ref 11.7–15.4)
IMM GRANULOCYTES # BLD AUTO: 0.06 K/UL (ref 0–0.5)
IMM GRANULOCYTES NFR BLD AUTO: 0.5 % (ref 0–5)
LDLC SERPL CALC-MCNC: 101 MG/DL (ref 0–100)
LYMPHOCYTES # BLD: 2.83 K/UL (ref 0.5–4.6)
LYMPHOCYTES NFR BLD: 25.5 % (ref 13–44)
MCH RBC QN AUTO: 30.5 PG (ref 26.1–32.9)
MCHC RBC AUTO-ENTMCNC: 32.3 G/DL (ref 31.4–35)
MCV RBC AUTO: 94.6 FL (ref 82–102)
MONOCYTES # BLD: 0.66 K/UL (ref 0.1–1.3)
MONOCYTES NFR BLD: 5.9 % (ref 4–12)
NEUTS SEG # BLD: 7.33 K/UL (ref 1.7–8.2)
NEUTS SEG NFR BLD: 66 % (ref 43–78)
NRBC # BLD: 0 K/UL (ref 0–0.2)
PLATELET # BLD AUTO: 369 K/UL (ref 150–450)
PMV BLD AUTO: 8.3 FL (ref 9.4–12.3)
RBC # BLD AUTO: 4.98 M/UL (ref 4.05–5.2)
TRIGL SERPL-MCNC: 124 MG/DL (ref 0–150)
VLDLC SERPL CALC-MCNC: 25 MG/DL (ref 6–23)
WBC # BLD AUTO: 11.1 K/UL (ref 4.3–11.1)

## 2025-01-24 ENCOUNTER — TRANSCRIBE ORDERS (OUTPATIENT)
Dept: SCHEDULING | Age: 65
End: 2025-01-24

## 2025-01-24 DIAGNOSIS — Z12.31 VISIT FOR SCREENING MAMMOGRAM: Primary | ICD-10-CM

## 2025-01-25 ASSESSMENT — ANXIETY QUESTIONNAIRES
IF YOU CHECKED OFF ANY PROBLEMS ON THIS QUESTIONNAIRE, HOW DIFFICULT HAVE THESE PROBLEMS MADE IT FOR YOU TO DO YOUR WORK, TAKE CARE OF THINGS AT HOME, OR GET ALONG WITH OTHER PEOPLE: VERY DIFFICULT
5. BEING SO RESTLESS THAT IT IS HARD TO SIT STILL: MORE THAN HALF THE DAYS
6. BECOMING EASILY ANNOYED OR IRRITABLE: MORE THAN HALF THE DAYS
GAD7 TOTAL SCORE: 15
3. WORRYING TOO MUCH ABOUT DIFFERENT THINGS: MORE THAN HALF THE DAYS
7. FEELING AFRAID AS IF SOMETHING AWFUL MIGHT HAPPEN: MORE THAN HALF THE DAYS
5. BEING SO RESTLESS THAT IT IS HARD TO SIT STILL: MORE THAN HALF THE DAYS
1. FEELING NERVOUS, ANXIOUS, OR ON EDGE: MORE THAN HALF THE DAYS
7. FEELING AFRAID AS IF SOMETHING AWFUL MIGHT HAPPEN: MORE THAN HALF THE DAYS
2. NOT BEING ABLE TO STOP OR CONTROL WORRYING: MORE THAN HALF THE DAYS
4. TROUBLE RELAXING: NEARLY EVERY DAY
1. FEELING NERVOUS, ANXIOUS, OR ON EDGE: MORE THAN HALF THE DAYS
2. NOT BEING ABLE TO STOP OR CONTROL WORRYING: MORE THAN HALF THE DAYS
4. TROUBLE RELAXING: NEARLY EVERY DAY
6. BECOMING EASILY ANNOYED OR IRRITABLE: MORE THAN HALF THE DAYS
3. WORRYING TOO MUCH ABOUT DIFFERENT THINGS: MORE THAN HALF THE DAYS
IF YOU CHECKED OFF ANY PROBLEMS ON THIS QUESTIONNAIRE, HOW DIFFICULT HAVE THESE PROBLEMS MADE IT FOR YOU TO DO YOUR WORK, TAKE CARE OF THINGS AT HOME, OR GET ALONG WITH OTHER PEOPLE: VERY DIFFICULT

## 2025-01-25 ASSESSMENT — LIFESTYLE VARIABLES
PAST THREE MONTHS WHAT IS THE LARGEST AMOUNT OF ALCOHOLIC DRINKS YOU HAVE CONSUMED IN ONE DAY: 0
ALCOHOL_DAYS_PER_WEEK: 0
HISTORY_ALCOHOL_USE: NO
HAVE YOU EVER RECEIVED ALCOHOL OR OTHER DRUG ABUSE TREATMENT: NO

## 2025-01-27 ENCOUNTER — OFFICE VISIT (OUTPATIENT)
Dept: BEHAVIORAL/MENTAL HEALTH CLINIC | Age: 65
End: 2025-01-27
Payer: MEDICARE

## 2025-01-27 DIAGNOSIS — F41.1 GAD (GENERALIZED ANXIETY DISORDER): ICD-10-CM

## 2025-01-27 DIAGNOSIS — F33.1 MDD (MAJOR DEPRESSIVE DISORDER), RECURRENT EPISODE, MODERATE (HCC): Primary | ICD-10-CM

## 2025-01-27 PROCEDURE — 90837 PSYTX W PT 60 MINUTES: CPT | Performed by: SOCIAL WORKER

## 2025-01-27 PROCEDURE — 1036F TOBACCO NON-USER: CPT | Performed by: SOCIAL WORKER

## 2025-01-27 ASSESSMENT — PATIENT HEALTH QUESTIONNAIRE - PHQ9
SUM OF ALL RESPONSES TO PHQ QUESTIONS 1-9: 21
10. IF YOU CHECKED OFF ANY PROBLEMS, HOW DIFFICULT HAVE THESE PROBLEMS MADE IT FOR YOU TO DO YOUR WORK, TAKE CARE OF THINGS AT HOME, OR GET ALONG WITH OTHER PEOPLE: VERY DIFFICULT
6. FEELING BAD ABOUT YOURSELF - OR THAT YOU ARE A FAILURE OR HAVE LET YOURSELF OR YOUR FAMILY DOWN: MORE THAN HALF THE DAYS
3. TROUBLE FALLING OR STAYING ASLEEP: NEARLY EVERY DAY
SUM OF ALL RESPONSES TO PHQ QUESTIONS 1-9: 21
7. TROUBLE CONCENTRATING ON THINGS, SUCH AS READING THE NEWSPAPER OR WATCHING TELEVISION: MORE THAN HALF THE DAYS
5. POOR APPETITE OR OVEREATING: NEARLY EVERY DAY
9. THOUGHTS THAT YOU WOULD BE BETTER OFF DEAD, OR OF HURTING YOURSELF: NOT AT ALL
SUM OF ALL RESPONSES TO PHQ QUESTIONS 1-9: 21
8. MOVING OR SPEAKING SO SLOWLY THAT OTHER PEOPLE COULD HAVE NOTICED. OR THE OPPOSITE, BEING SO FIGETY OR RESTLESS THAT YOU HAVE BEEN MOVING AROUND A LOT MORE THAN USUAL: NEARLY EVERY DAY
SUM OF ALL RESPONSES TO PHQ QUESTIONS 1-9: 21
2. FEELING DOWN, DEPRESSED OR HOPELESS: MORE THAN HALF THE DAYS
4. FEELING TIRED OR HAVING LITTLE ENERGY: NEARLY EVERY DAY
1. LITTLE INTEREST OR PLEASURE IN DOING THINGS: NEARLY EVERY DAY
SUM OF ALL RESPONSES TO PHQ9 QUESTIONS 1 & 2: 5

## 2025-01-27 NOTE — PROGRESS NOTES
Reports to experience increased sadness related to certain commercials on TV. Notes commercials related to the [employer] and [employer]. [employer]. Describes spending most of her days in her bedroom watching TV and playing games on her phone. Reports strained relationship with her  who mainly resides in their living room. Reports  deals with ETOH abuse on a near daily basis. Reports attempts to distance self while he is intoxicated due to increase in arguments and yelling. Also notes,  tends to trash his room and the kitchen when intoxicated.   Pt also reports anxiety of leaving the home. Notes recent news related to the bird flu as possible risk of leaving.     Reports trauma hx of growing up with her father who also dealt with ETOH abuse. Reports her mother suddenly left the family with no known reason. Reports her father would state she was visiting family in [place], although, she would never return. Pt reports have eventually learned the truth that her mother left due to another relationship when she reached the age of 18. Reports her youngest brother  of esophagus cancer in , as well as, the loss of there oldest brother last year from natural causes. Reports additional traumas related to the birth of both her children who needed extensive time in hospital. Daughter due to being premature and son due a tumor.     Reports sleep disturbances in which it is difficulty to fall asleep and stay asleep. Notes Trazodone has helped. Also notes Cymbalta causing daytime fatigue/sleepiness where pt tends to nap. Reports her PCP has encouraged she speak with her psychiatrist to address this potential side effect. Reports negative self talk. Describes caring more about well-being of others than herself. Discussed thoughts of being stuck within marriage and house.     Utilized CBT to lend support for topics pt shared. Utilized CBT to process behavioral activation goals to increase time outside of her room. Explored

## 2025-02-14 ASSESSMENT — ANXIETY QUESTIONNAIRES
5. BEING SO RESTLESS THAT IT IS HARD TO SIT STILL: NOT AT ALL
GAD7 TOTAL SCORE: 4
4. TROUBLE RELAXING: NOT AT ALL
7. FEELING AFRAID AS IF SOMETHING AWFUL MIGHT HAPPEN: SEVERAL DAYS
1. FEELING NERVOUS, ANXIOUS, OR ON EDGE: NOT AT ALL
2. NOT BEING ABLE TO STOP OR CONTROL WORRYING: SEVERAL DAYS
3. WORRYING TOO MUCH ABOUT DIFFERENT THINGS: SEVERAL DAYS
6. BECOMING EASILY ANNOYED OR IRRITABLE: SEVERAL DAYS
7. FEELING AFRAID AS IF SOMETHING AWFUL MIGHT HAPPEN: SEVERAL DAYS
2. NOT BEING ABLE TO STOP OR CONTROL WORRYING: SEVERAL DAYS
6. BECOMING EASILY ANNOYED OR IRRITABLE: SEVERAL DAYS
5. BEING SO RESTLESS THAT IT IS HARD TO SIT STILL: NOT AT ALL
3. WORRYING TOO MUCH ABOUT DIFFERENT THINGS: SEVERAL DAYS
IF YOU CHECKED OFF ANY PROBLEMS ON THIS QUESTIONNAIRE, HOW DIFFICULT HAVE THESE PROBLEMS MADE IT FOR YOU TO DO YOUR WORK, TAKE CARE OF THINGS AT HOME, OR GET ALONG WITH OTHER PEOPLE: SOMEWHAT DIFFICULT
IF YOU CHECKED OFF ANY PROBLEMS ON THIS QUESTIONNAIRE, HOW DIFFICULT HAVE THESE PROBLEMS MADE IT FOR YOU TO DO YOUR WORK, TAKE CARE OF THINGS AT HOME, OR GET ALONG WITH OTHER PEOPLE: SOMEWHAT DIFFICULT
1. FEELING NERVOUS, ANXIOUS, OR ON EDGE: NOT AT ALL
4. TROUBLE RELAXING: NOT AT ALL

## 2025-02-14 ASSESSMENT — PATIENT HEALTH QUESTIONNAIRE - PHQ9
7. TROUBLE CONCENTRATING ON THINGS, SUCH AS READING THE NEWSPAPER OR WATCHING TELEVISION: SEVERAL DAYS
SUM OF ALL RESPONSES TO PHQ QUESTIONS 1-9: 10
4. FEELING TIRED OR HAVING LITTLE ENERGY: MORE THAN HALF THE DAYS
1. LITTLE INTEREST OR PLEASURE IN DOING THINGS: SEVERAL DAYS
3. TROUBLE FALLING OR STAYING ASLEEP: SEVERAL DAYS
8. MOVING OR SPEAKING SO SLOWLY THAT OTHER PEOPLE COULD HAVE NOTICED. OR THE OPPOSITE - BEING SO FIDGETY OR RESTLESS THAT YOU HAVE BEEN MOVING AROUND A LOT MORE THAN USUAL: SEVERAL DAYS
SUM OF ALL RESPONSES TO PHQ9 QUESTIONS 1 & 2: 2
2. FEELING DOWN, DEPRESSED OR HOPELESS: SEVERAL DAYS
9. THOUGHTS THAT YOU WOULD BE BETTER OFF DEAD, OR OF HURTING YOURSELF: NOT AT ALL
SUM OF ALL RESPONSES TO PHQ QUESTIONS 1-9: 10
4. FEELING TIRED OR HAVING LITTLE ENERGY: MORE THAN HALF THE DAYS
3. TROUBLE FALLING OR STAYING ASLEEP: SEVERAL DAYS
SUM OF ALL RESPONSES TO PHQ QUESTIONS 1-9: 10
5. POOR APPETITE OR OVEREATING: MORE THAN HALF THE DAYS
5. POOR APPETITE OR OVEREATING: MORE THAN HALF THE DAYS
6. FEELING BAD ABOUT YOURSELF - OR THAT YOU ARE A FAILURE OR HAVE LET YOURSELF OR YOUR FAMILY DOWN: SEVERAL DAYS
10. IF YOU CHECKED OFF ANY PROBLEMS, HOW DIFFICULT HAVE THESE PROBLEMS MADE IT FOR YOU TO DO YOUR WORK, TAKE CARE OF THINGS AT HOME, OR GET ALONG WITH OTHER PEOPLE: SOMEWHAT DIFFICULT
9. THOUGHTS THAT YOU WOULD BE BETTER OFF DEAD, OR OF HURTING YOURSELF: NOT AT ALL
8. MOVING OR SPEAKING SO SLOWLY THAT OTHER PEOPLE COULD HAVE NOTICED. OR THE OPPOSITE, BEING SO FIGETY OR RESTLESS THAT YOU HAVE BEEN MOVING AROUND A LOT MORE THAN USUAL: SEVERAL DAYS
10. IF YOU CHECKED OFF ANY PROBLEMS, HOW DIFFICULT HAVE THESE PROBLEMS MADE IT FOR YOU TO DO YOUR WORK, TAKE CARE OF THINGS AT HOME, OR GET ALONG WITH OTHER PEOPLE: SOMEWHAT DIFFICULT
7. TROUBLE CONCENTRATING ON THINGS, SUCH AS READING THE NEWSPAPER OR WATCHING TELEVISION: SEVERAL DAYS
6. FEELING BAD ABOUT YOURSELF - OR THAT YOU ARE A FAILURE OR HAVE LET YOURSELF OR YOUR FAMILY DOWN: SEVERAL DAYS
SUM OF ALL RESPONSES TO PHQ QUESTIONS 1-9: 10
2. FEELING DOWN, DEPRESSED OR HOPELESS: SEVERAL DAYS
1. LITTLE INTEREST OR PLEASURE IN DOING THINGS: SEVERAL DAYS
SUM OF ALL RESPONSES TO PHQ QUESTIONS 1-9: 10

## 2025-02-17 ENCOUNTER — OFFICE VISIT (OUTPATIENT)
Dept: BEHAVIORAL/MENTAL HEALTH CLINIC | Age: 65
End: 2025-02-17
Payer: MEDICARE

## 2025-02-17 DIAGNOSIS — F33.1 MDD (MAJOR DEPRESSIVE DISORDER), RECURRENT EPISODE, MODERATE (HCC): Primary | ICD-10-CM

## 2025-02-17 DIAGNOSIS — F41.1 GAD (GENERALIZED ANXIETY DISORDER): ICD-10-CM

## 2025-02-17 PROCEDURE — 1036F TOBACCO NON-USER: CPT | Performed by: SOCIAL WORKER

## 2025-02-17 PROCEDURE — 90834 PSYTX W PT 45 MINUTES: CPT | Performed by: SOCIAL WORKER

## 2025-02-17 NOTE — PROGRESS NOTES
INDIVIDUAL THERAPY NOTE  Patient: Polly Delong         Date: 2025   MR#: [id number]              : 1960    IDENTIFYING INFORMATION: This is a 64 y.o. old female who is a patient whom presents to clinic for psychotherapy follow up.     CHIEF COMPLAINT: had concerns including Anxiety and Depression.    DIAGNOSIS: :    1. MDD (major depressive disorder), recurrent episode, moderate (HCC)    2. ANGEL (generalized anxiety disorder)        DURATION: 50 minutes   Start Time: 230 PM   End Time:  320 PM     HISTORY OF PRESENT ILLNESS:     Reports to experience increased sadness related to certain commercials on TV. Notes commercials related to the [employer] and [employer]. Describes spending most of her days in her bedroom watching TV and playing games on her phone. Reports strained relationship with her  who mainly resides in their living room. Reports  deals with ETOH abuse on a near daily basis. Reports attempts to distance self while he is intoxicated due to increase in arguments and yelling. Also notes,  tends to trash his room and the kitchen when intoxicated.   Pt also reports anxiety of leaving the home. Notes recent news related to the bird flu as possible risk of leaving.      Reports trauma hx of growing up with her father who also dealt with ETOH abuse. Reports her mother suddenly left the family with no known reason. Reports her father would state she was visiting family in [place], although, she would never return. Pt reports have eventually learned the truth that her mother left due to another relationship when she reached the age of 18. Reports her youngest brother  of esophagus cancer in , as well as, the loss of there oldest brother last year from natural causes. Reports additional traumas related to the birth of both her children who needed extensive time in hospital. Daughter due to being premature and son due a tumor.      Reports sleep disturbances in which it is

## 2025-03-03 ENCOUNTER — HOSPITAL ENCOUNTER (OUTPATIENT)
Dept: MAMMOGRAPHY | Age: 65
Discharge: HOME | End: 2025-03-06
Attending: FAMILY MEDICINE
Payer: MEDICARE

## 2025-03-03 DIAGNOSIS — Z12.31 VISIT FOR SCREENING MAMMOGRAM: ICD-10-CM

## 2025-03-03 PROCEDURE — 77063 BREAST TOMOSYNTHESIS BI: CPT

## 2025-03-08 ENCOUNTER — RESULTS FOLLOW-UP (OUTPATIENT)
Dept: MAMMOGRAPHY | Age: 65
End: 2025-03-08

## 2025-03-17 ENCOUNTER — OFFICE VISIT (OUTPATIENT)
Dept: BEHAVIORAL/MENTAL HEALTH CLINIC | Age: 65
End: 2025-03-17
Payer: MEDICARE

## 2025-03-17 DIAGNOSIS — F41.1 GAD (GENERALIZED ANXIETY DISORDER): ICD-10-CM

## 2025-03-17 DIAGNOSIS — F33.1 MDD (MAJOR DEPRESSIVE DISORDER), RECURRENT EPISODE, MODERATE (HCC): Primary | ICD-10-CM

## 2025-03-17 PROCEDURE — 1036F TOBACCO NON-USER: CPT | Performed by: SOCIAL WORKER

## 2025-03-17 PROCEDURE — 90837 PSYTX W PT 60 MINUTES: CPT | Performed by: SOCIAL WORKER

## 2025-03-17 ASSESSMENT — PATIENT HEALTH QUESTIONNAIRE - PHQ9
8. MOVING OR SPEAKING SO SLOWLY THAT OTHER PEOPLE COULD HAVE NOTICED. OR THE OPPOSITE, BEING SO FIGETY OR RESTLESS THAT YOU HAVE BEEN MOVING AROUND A LOT MORE THAN USUAL: NOT AT ALL
10. IF YOU CHECKED OFF ANY PROBLEMS, HOW DIFFICULT HAVE THESE PROBLEMS MADE IT FOR YOU TO DO YOUR WORK, TAKE CARE OF THINGS AT HOME, OR GET ALONG WITH OTHER PEOPLE: VERY DIFFICULT
4. FEELING TIRED OR HAVING LITTLE ENERGY: NEARLY EVERY DAY
7. TROUBLE CONCENTRATING ON THINGS, SUCH AS READING THE NEWSPAPER OR WATCHING TELEVISION: SEVERAL DAYS
SUM OF ALL RESPONSES TO PHQ QUESTIONS 1-9: 11
3. TROUBLE FALLING OR STAYING ASLEEP: MORE THAN HALF THE DAYS
5. POOR APPETITE OR OVEREATING: MORE THAN HALF THE DAYS
1. LITTLE INTEREST OR PLEASURE IN DOING THINGS: MORE THAN HALF THE DAYS
SUM OF ALL RESPONSES TO PHQ QUESTIONS 1-9: 11
9. THOUGHTS THAT YOU WOULD BE BETTER OFF DEAD, OR OF HURTING YOURSELF: NOT AT ALL
SUM OF ALL RESPONSES TO PHQ QUESTIONS 1-9: 11
2. FEELING DOWN, DEPRESSED OR HOPELESS: SEVERAL DAYS
SUM OF ALL RESPONSES TO PHQ QUESTIONS 1-9: 11

## 2025-03-17 ASSESSMENT — ANXIETY QUESTIONNAIRES
2. NOT BEING ABLE TO STOP OR CONTROL WORRYING: SEVERAL DAYS
1. FEELING NERVOUS, ANXIOUS, OR ON EDGE: MORE THAN HALF THE DAYS
5. BEING SO RESTLESS THAT IT IS HARD TO SIT STILL: SEVERAL DAYS
3. WORRYING TOO MUCH ABOUT DIFFERENT THINGS: MORE THAN HALF THE DAYS
6. BECOMING EASILY ANNOYED OR IRRITABLE: NEARLY EVERY DAY
IF YOU CHECKED OFF ANY PROBLEMS ON THIS QUESTIONNAIRE, HOW DIFFICULT HAVE THESE PROBLEMS MADE IT FOR YOU TO DO YOUR WORK, TAKE CARE OF THINGS AT HOME, OR GET ALONG WITH OTHER PEOPLE: VERY DIFFICULT
4. TROUBLE RELAXING: MORE THAN HALF THE DAYS
7. FEELING AFRAID AS IF SOMETHING AWFUL MIGHT HAPPEN: SEVERAL DAYS
GAD7 TOTAL SCORE: 12

## 2025-03-17 NOTE — PROGRESS NOTES
Seeking Safety, Psychodynamic, ERP may be used to address goals.    Follow Up: 4 weeks     LOCATION OF SERVICE: Therapist was at [facility].        --DOROTHY Canela on 3/17/2025 at 2:32 PM    An electronic signature was used to authenticate this note.     Elements of this note have been dictated using speech recognition software. As a result, errors of speech recognition may have occurred.      Will continue to meet with and be available to patient as scheduled and per patient's request/compliance.

## 2025-04-12 SDOH — HEALTH STABILITY: PHYSICAL HEALTH: ON AVERAGE, HOW MANY DAYS PER WEEK DO YOU ENGAGE IN MODERATE TO STRENUOUS EXERCISE (LIKE A BRISK WALK)?: 1 DAY

## 2025-04-12 SDOH — HEALTH STABILITY: PHYSICAL HEALTH: ON AVERAGE, HOW MANY MINUTES DO YOU ENGAGE IN EXERCISE AT THIS LEVEL?: 0 MIN

## 2025-04-12 ASSESSMENT — PATIENT HEALTH QUESTIONNAIRE - PHQ9
SUM OF ALL RESPONSES TO PHQ QUESTIONS 1-9: 13
3. TROUBLE FALLING OR STAYING ASLEEP: MORE THAN HALF THE DAYS
7. TROUBLE CONCENTRATING ON THINGS, SUCH AS READING THE NEWSPAPER OR WATCHING TELEVISION: SEVERAL DAYS
SUM OF ALL RESPONSES TO PHQ QUESTIONS 1-9: 13
5. POOR APPETITE OR OVEREATING: NEARLY EVERY DAY
8. MOVING OR SPEAKING SO SLOWLY THAT OTHER PEOPLE COULD HAVE NOTICED. OR THE OPPOSITE, BEING SO FIGETY OR RESTLESS THAT YOU HAVE BEEN MOVING AROUND A LOT MORE THAN USUAL: SEVERAL DAYS
2. FEELING DOWN, DEPRESSED OR HOPELESS: MORE THAN HALF THE DAYS
9. THOUGHTS THAT YOU WOULD BE BETTER OFF DEAD, OR OF HURTING YOURSELF: NOT AT ALL
SUM OF ALL RESPONSES TO PHQ QUESTIONS 1-9: 13
SUM OF ALL RESPONSES TO PHQ QUESTIONS 1-9: 13
1. LITTLE INTEREST OR PLEASURE IN DOING THINGS: SEVERAL DAYS
6. FEELING BAD ABOUT YOURSELF - OR THAT YOU ARE A FAILURE OR HAVE LET YOURSELF OR YOUR FAMILY DOWN: SEVERAL DAYS
10. IF YOU CHECKED OFF ANY PROBLEMS, HOW DIFFICULT HAVE THESE PROBLEMS MADE IT FOR YOU TO DO YOUR WORK, TAKE CARE OF THINGS AT HOME, OR GET ALONG WITH OTHER PEOPLE: SOMEWHAT DIFFICULT
4. FEELING TIRED OR HAVING LITTLE ENERGY: MORE THAN HALF THE DAYS

## 2025-04-12 ASSESSMENT — LIFESTYLE VARIABLES
HOW OFTEN DO YOU HAVE A DRINK CONTAINING ALCOHOL: NEVER
HOW OFTEN DO YOU HAVE A DRINK CONTAINING ALCOHOL: 1
HOW MANY STANDARD DRINKS CONTAINING ALCOHOL DO YOU HAVE ON A TYPICAL DAY: 0
HOW OFTEN DO YOU HAVE SIX OR MORE DRINKS ON ONE OCCASION: 1
HOW MANY STANDARD DRINKS CONTAINING ALCOHOL DO YOU HAVE ON A TYPICAL DAY: PATIENT DOES NOT DRINK

## 2025-04-14 ASSESSMENT — PATIENT HEALTH QUESTIONNAIRE - PHQ9
5. POOR APPETITE OR OVEREATING: MORE THAN HALF THE DAYS
8. MOVING OR SPEAKING SO SLOWLY THAT OTHER PEOPLE COULD HAVE NOTICED. OR THE OPPOSITE - BEING SO FIDGETY OR RESTLESS THAT YOU HAVE BEEN MOVING AROUND A LOT MORE THAN USUAL: NOT AT ALL
SUM OF ALL RESPONSES TO PHQ QUESTIONS 1-9: 8
3. TROUBLE FALLING OR STAYING ASLEEP: SEVERAL DAYS
4. FEELING TIRED OR HAVING LITTLE ENERGY: MORE THAN HALF THE DAYS
1. LITTLE INTEREST OR PLEASURE IN DOING THINGS: NOT AT ALL
5. POOR APPETITE OR OVEREATING: MORE THAN HALF THE DAYS
10. IF YOU CHECKED OFF ANY PROBLEMS, HOW DIFFICULT HAVE THESE PROBLEMS MADE IT FOR YOU TO DO YOUR WORK, TAKE CARE OF THINGS AT HOME, OR GET ALONG WITH OTHER PEOPLE: NOT DIFFICULT AT ALL
SUM OF ALL RESPONSES TO PHQ QUESTIONS 1-9: 8
7. TROUBLE CONCENTRATING ON THINGS, SUCH AS READING THE NEWSPAPER OR WATCHING TELEVISION: SEVERAL DAYS
9. THOUGHTS THAT YOU WOULD BE BETTER OFF DEAD, OR OF HURTING YOURSELF: NOT AT ALL
SUM OF ALL RESPONSES TO PHQ QUESTIONS 1-9: 8
SUM OF ALL RESPONSES TO PHQ QUESTIONS 1-9: 8
2. FEELING DOWN, DEPRESSED OR HOPELESS: SEVERAL DAYS
7. TROUBLE CONCENTRATING ON THINGS, SUCH AS READING THE NEWSPAPER OR WATCHING TELEVISION: SEVERAL DAYS
6. FEELING BAD ABOUT YOURSELF - OR THAT YOU ARE A FAILURE OR HAVE LET YOURSELF OR YOUR FAMILY DOWN: SEVERAL DAYS
2. FEELING DOWN, DEPRESSED OR HOPELESS: SEVERAL DAYS
8. MOVING OR SPEAKING SO SLOWLY THAT OTHER PEOPLE COULD HAVE NOTICED. OR THE OPPOSITE, BEING SO FIGETY OR RESTLESS THAT YOU HAVE BEEN MOVING AROUND A LOT MORE THAN USUAL: NOT AT ALL
3. TROUBLE FALLING OR STAYING ASLEEP: SEVERAL DAYS
9. THOUGHTS THAT YOU WOULD BE BETTER OFF DEAD, OR OF HURTING YOURSELF: NOT AT ALL
10. IF YOU CHECKED OFF ANY PROBLEMS, HOW DIFFICULT HAVE THESE PROBLEMS MADE IT FOR YOU TO DO YOUR WORK, TAKE CARE OF THINGS AT HOME, OR GET ALONG WITH OTHER PEOPLE: NOT DIFFICULT AT ALL
4. FEELING TIRED OR HAVING LITTLE ENERGY: MORE THAN HALF THE DAYS
SUM OF ALL RESPONSES TO PHQ QUESTIONS 1-9: 8
1. LITTLE INTEREST OR PLEASURE IN DOING THINGS: NOT AT ALL
6. FEELING BAD ABOUT YOURSELF - OR THAT YOU ARE A FAILURE OR HAVE LET YOURSELF OR YOUR FAMILY DOWN: SEVERAL DAYS

## 2025-04-14 ASSESSMENT — ANXIETY QUESTIONNAIRES
2. NOT BEING ABLE TO STOP OR CONTROL WORRYING: SEVERAL DAYS
1. FEELING NERVOUS, ANXIOUS, OR ON EDGE: SEVERAL DAYS
4. TROUBLE RELAXING: NOT AT ALL
7. FEELING AFRAID AS IF SOMETHING AWFUL MIGHT HAPPEN: SEVERAL DAYS
IF YOU CHECKED OFF ANY PROBLEMS ON THIS QUESTIONNAIRE, HOW DIFFICULT HAVE THESE PROBLEMS MADE IT FOR YOU TO DO YOUR WORK, TAKE CARE OF THINGS AT HOME, OR GET ALONG WITH OTHER PEOPLE: SOMEWHAT DIFFICULT
GAD7 TOTAL SCORE: 6
6. BECOMING EASILY ANNOYED OR IRRITABLE: SEVERAL DAYS
3. WORRYING TOO MUCH ABOUT DIFFERENT THINGS: MORE THAN HALF THE DAYS
1. FEELING NERVOUS, ANXIOUS, OR ON EDGE: SEVERAL DAYS
6. BECOMING EASILY ANNOYED OR IRRITABLE: SEVERAL DAYS
5. BEING SO RESTLESS THAT IT IS HARD TO SIT STILL: NOT AT ALL
4. TROUBLE RELAXING: NOT AT ALL
7. FEELING AFRAID AS IF SOMETHING AWFUL MIGHT HAPPEN: SEVERAL DAYS
3. WORRYING TOO MUCH ABOUT DIFFERENT THINGS: MORE THAN HALF THE DAYS
IF YOU CHECKED OFF ANY PROBLEMS ON THIS QUESTIONNAIRE, HOW DIFFICULT HAVE THESE PROBLEMS MADE IT FOR YOU TO DO YOUR WORK, TAKE CARE OF THINGS AT HOME, OR GET ALONG WITH OTHER PEOPLE: SOMEWHAT DIFFICULT
5. BEING SO RESTLESS THAT IT IS HARD TO SIT STILL: NOT AT ALL
2. NOT BEING ABLE TO STOP OR CONTROL WORRYING: SEVERAL DAYS

## 2025-04-15 ENCOUNTER — OFFICE VISIT (OUTPATIENT)
Dept: FAMILY MEDICINE CLINIC | Facility: CLINIC | Age: 65
End: 2025-04-15
Payer: MEDICARE

## 2025-04-15 ENCOUNTER — OFFICE VISIT (OUTPATIENT)
Dept: BEHAVIORAL/MENTAL HEALTH CLINIC | Age: 65
End: 2025-04-15
Payer: MEDICARE

## 2025-04-15 VITALS
SYSTOLIC BLOOD PRESSURE: 130 MMHG | HEART RATE: 105 BPM | BODY MASS INDEX: 32.44 KG/M2 | DIASTOLIC BLOOD PRESSURE: 88 MMHG | RESPIRATION RATE: 18 BRPM | WEIGHT: 190 LBS | OXYGEN SATURATION: 97 % | HEIGHT: 64 IN

## 2025-04-15 DIAGNOSIS — Z87.891 PERSONAL HISTORY OF TOBACCO USE: ICD-10-CM

## 2025-04-15 DIAGNOSIS — E78.2 MIXED HYPERLIPIDEMIA: ICD-10-CM

## 2025-04-15 DIAGNOSIS — E03.9 ACQUIRED HYPOTHYROIDISM: ICD-10-CM

## 2025-04-15 DIAGNOSIS — G40.909 SEIZURE DISORDER (HCC): ICD-10-CM

## 2025-04-15 DIAGNOSIS — M25.551 RIGHT HIP PAIN: ICD-10-CM

## 2025-04-15 DIAGNOSIS — F33.1 MDD (MAJOR DEPRESSIVE DISORDER), RECURRENT EPISODE, MODERATE (HCC): Primary | ICD-10-CM

## 2025-04-15 DIAGNOSIS — F41.9 ANXIETY: ICD-10-CM

## 2025-04-15 DIAGNOSIS — Z85.3 HISTORY OF BREAST CANCER: ICD-10-CM

## 2025-04-15 DIAGNOSIS — M25.552 LEFT HIP PAIN: ICD-10-CM

## 2025-04-15 DIAGNOSIS — M51.360 DEGENERATION OF INTERVERTEBRAL DISC OF LUMBAR REGION WITH DISCOGENIC BACK PAIN: ICD-10-CM

## 2025-04-15 DIAGNOSIS — Z00.00 MEDICARE ANNUAL WELLNESS VISIT, SUBSEQUENT: Primary | ICD-10-CM

## 2025-04-15 DIAGNOSIS — F41.1 GAD (GENERALIZED ANXIETY DISORDER): ICD-10-CM

## 2025-04-15 DIAGNOSIS — J45.20 MILD INTERMITTENT ASTHMA WITHOUT COMPLICATION: ICD-10-CM

## 2025-04-15 DIAGNOSIS — R13.10 DYSPHAGIA, UNSPECIFIED TYPE: ICD-10-CM

## 2025-04-15 DIAGNOSIS — M48.062 LUMBAR STENOSIS WITH NEUROGENIC CLAUDICATION: ICD-10-CM

## 2025-04-15 DIAGNOSIS — E78.00 HIGH CHOLESTEROL: ICD-10-CM

## 2025-04-15 DIAGNOSIS — R41.3 MEMORY DIFFICULTY: ICD-10-CM

## 2025-04-15 DIAGNOSIS — J41.0 SIMPLE CHRONIC BRONCHITIS (HCC): ICD-10-CM

## 2025-04-15 DIAGNOSIS — G89.29 OTHER CHRONIC PAIN: ICD-10-CM

## 2025-04-15 DIAGNOSIS — E78.00 PURE HYPERCHOLESTEROLEMIA: ICD-10-CM

## 2025-04-15 LAB
ALBUMIN SERPL-MCNC: 3.9 G/DL (ref 3.2–4.6)
ALBUMIN/GLOB SERPL: 1.4 (ref 1–1.9)
ALP SERPL-CCNC: 118 U/L (ref 35–104)
ALT SERPL-CCNC: 20 U/L (ref 8–45)
ANION GAP SERPL CALC-SCNC: 12 MMOL/L (ref 7–16)
AST SERPL-CCNC: 20 U/L (ref 15–37)
BILIRUB SERPL-MCNC: 0.3 MG/DL (ref 0–1.2)
BUN SERPL-MCNC: 20 MG/DL (ref 8–23)
CALCIUM SERPL-MCNC: 8.8 MG/DL (ref 8.8–10.2)
CHLORIDE SERPL-SCNC: 112 MMOL/L (ref 98–107)
CHOLEST SERPL-MCNC: 163 MG/DL (ref 0–200)
CO2 SERPL-SCNC: 22 MMOL/L (ref 20–29)
CREAT SERPL-MCNC: 0.84 MG/DL (ref 0.6–1.1)
GLOBULIN SER CALC-MCNC: 2.7 G/DL (ref 2.3–3.5)
GLUCOSE SERPL-MCNC: 97 MG/DL (ref 70–99)
HDLC SERPL-MCNC: 39 MG/DL (ref 40–60)
HDLC SERPL: 4.2 (ref 0–5)
LDLC SERPL CALC-MCNC: 84 MG/DL (ref 0–100)
POTASSIUM SERPL-SCNC: 3.9 MMOL/L (ref 3.5–5.1)
PROT SERPL-MCNC: 6.6 G/DL (ref 6.3–8.2)
SODIUM SERPL-SCNC: 145 MMOL/L (ref 136–145)
TRIGL SERPL-MCNC: 203 MG/DL (ref 0–150)
TSH, 3RD GENERATION: 1.54 UIU/ML (ref 0.27–4.2)
VLDLC SERPL CALC-MCNC: 41 MG/DL (ref 6–23)

## 2025-04-15 PROCEDURE — 3023F SPIROM DOC REV: CPT | Performed by: FAMILY MEDICINE

## 2025-04-15 PROCEDURE — 90834 PSYTX W PT 45 MINUTES: CPT | Performed by: SOCIAL WORKER

## 2025-04-15 PROCEDURE — 1036F TOBACCO NON-USER: CPT | Performed by: FAMILY MEDICINE

## 2025-04-15 PROCEDURE — G8417 CALC BMI ABV UP PARAM F/U: HCPCS | Performed by: FAMILY MEDICINE

## 2025-04-15 PROCEDURE — 3017F COLORECTAL CA SCREEN DOC REV: CPT | Performed by: FAMILY MEDICINE

## 2025-04-15 PROCEDURE — 99214 OFFICE O/P EST MOD 30 MIN: CPT | Performed by: FAMILY MEDICINE

## 2025-04-15 PROCEDURE — G0439 PPPS, SUBSEQ VISIT: HCPCS | Performed by: FAMILY MEDICINE

## 2025-04-15 PROCEDURE — G8427 DOCREV CUR MEDS BY ELIG CLIN: HCPCS | Performed by: FAMILY MEDICINE

## 2025-04-15 PROCEDURE — 1036F TOBACCO NON-USER: CPT | Performed by: SOCIAL WORKER

## 2025-04-15 PROCEDURE — G0296 VISIT TO DETERM LDCT ELIG: HCPCS | Performed by: FAMILY MEDICINE

## 2025-04-15 RX ORDER — BENZONATATE 100 MG/1
CAPSULE ORAL
Qty: 20 CAPSULE | Refills: 0 | Status: SHIPPED | OUTPATIENT
Start: 2025-04-15

## 2025-04-15 RX ORDER — MELOXICAM 7.5 MG/1
7.5 TABLET ORAL DAILY
Qty: 30 TABLET | Refills: 11 | Status: SHIPPED | OUTPATIENT
Start: 2025-04-15

## 2025-04-15 RX ORDER — DESVENLAFAXINE 100 MG/1
TABLET, EXTENDED RELEASE ORAL DAILY
COMMUNITY
Start: 2025-02-25

## 2025-04-15 RX ORDER — LEVOTHYROXINE SODIUM 112 UG/1
112 TABLET ORAL DAILY
Qty: 90 TABLET | Refills: 3 | Status: SHIPPED | OUTPATIENT
Start: 2025-04-15

## 2025-04-15 RX ORDER — LOPERAMIDE HYDROCHLORIDE 2 MG/1
2 CAPSULE ORAL 4 TIMES DAILY PRN
COMMUNITY

## 2025-04-15 RX ORDER — ROSUVASTATIN CALCIUM 10 MG/1
10 TABLET, COATED ORAL NIGHTLY
Qty: 90 TABLET | Refills: 3 | Status: SHIPPED | OUTPATIENT
Start: 2025-04-15

## 2025-04-15 RX ORDER — FLUTICASONE FUROATE, UMECLIDINIUM BROMIDE AND VILANTEROL TRIFENATATE 100; 62.5; 25 UG/1; UG/1; UG/1
1 POWDER RESPIRATORY (INHALATION) DAILY
Qty: 1 EACH | Refills: 11 | Status: SHIPPED | OUTPATIENT
Start: 2025-04-15

## 2025-04-15 NOTE — PROGRESS NOTES
INDIVIDUAL THERAPY NOTE  Patient: Polly Delong         Date: 4/15/2025   MR#: [id number]              : 1960    IDENTIFYING INFORMATION: This is a 64 y.o. old female who is a patient whom presents to clinic for psychotherapy follow up.     CHIEF COMPLAINT: had concerns including Anxiety and Depression.    DIAGNOSIS: :    1. MDD (major depressive disorder), recurrent episode, moderate (HCC)    2. ANGEL (generalized anxiety disorder)        DURATION: 45 minutes   Start Time: 1030 AM  End Time:  1115 AM    HISTORY OF PRESENT ILLNESS:    Reports to have began attempting smoking cessation. Reports this has increased irritability. Reports to currently be smoking 3-6 cigarettes a day. Around a 50% reduction in smoking 10 cigarettes a day. Reports  continues to buy her cigarettes due to her irritability. Notes this is creating difficulty with cessation. Has asked him to stop but this is not successful. Pt also reports increase in anxiety when taking pristiq QAM vs QHS. Reports to have returned to QHS and symptoms are better managed.   Utilized MI to process discrepancies between benefits and costs of smoking. Utilized MI to process hx of smoking as it relates to pt's association with the behavior. Utilized MI to process engagement in behaviors that generate pleasure and are relaxing. Pt reports strong artistic skill sets related to this goal to utilize.      ___________________________________________________________________________________      Intervention:     Pt presents for f/u appt. Ongoing anxious and depressive stressors. Notes her  to be her main depressive stressor. Describes relationship as having to care for a child. Notes working hard to clean and organize home only for  to undue her work on a consistent basis. Reports dynamic can become so stressful she goes into her room and cries. Reports previous therapist of 15 years encouraged pt to seek divorce. Reports to have met with

## 2025-04-15 NOTE — PATIENT INSTRUCTIONS
of: July 31, 2024  Content Version: 14.4  © 1234-4149 [employer].   Care instructions adapted under license by [id number]. If you have questions about a medical condition or this instruction, always ask your healthcare professional. [employer] disclaims any warranty or liability for your use of this information.         Eating Healthy Foods: Care Instructions  With every meal, you can make healthy food choices. Try to eat a variety of fruits, vegetables, whole grains, lean proteins, and low-fat dairy products. This can help you get the right balance of nutrients, including vitamins and minerals. Small changes add up over time. You can start by adding one healthy food to your meals each day.    Try to make half your plate fruits and vegetables, one-fourth whole grains, and one-fourth lean proteins. Try including dairy with your meals.   Eat more fruits and vegetables. Try to have them with most meals and snacks.   Foods for healthy eating        Fruits   These can be fresh, frozen, canned, or dried.  Try to choose whole fruit rather than fruit juice.  Eat a variety of colors.        Vegetables   These can be fresh, frozen, canned, or dried.  Beans, peas, and lentils count too.        Whole grains   Choose whole-grain breads, cereals, and noodles.  Try brown rice.        Lean proteins   These can include lean meat, poultry, fish, and eggs.  You can also have tofu, beans, peas, lentils, nuts, and seeds.        Dairy   Try milk, yogurt, and cheese.  Choose low-fat or fat-free when you can.  If you need to, use lactose-free milk or fortified plant-based milk products, such as soy milk.        Water   Drink water when you're thirsty.  Limit sugar-sweetened drinks, including soda, fruit drinks, and sports drinks.  Where can you learn more?  Go to https://www.healthwise.net/patientEd and enter T756 to learn more about \"Eating Healthy Foods: Care Instructions.\"  Current as of: October 7,

## 2025-04-15 NOTE — PROGRESS NOTES
patient in written form: see Patient Instructions/AVS.     Reviewed and updated this visit:  Allergies  Meds                   The patient (or guardian, if applicable) and other individuals in attendance with the patient were advised that Artificial Intelligence will be utilized during this visit to record and process the conversation to generate a clinical note. The patient (or guardian, if applicable) and other individuals in attendance at the appointment consented to the use of AI, including the recording.

## 2025-04-16 ENCOUNTER — RESULTS FOLLOW-UP (OUTPATIENT)
Dept: FAMILY MEDICINE CLINIC | Facility: CLINIC | Age: 65
End: 2025-04-16

## 2025-04-16 NOTE — RESULT ENCOUNTER NOTE
Let patient know labs normal except triglycerides are slightly high. Watch sugars in diet and recheck one year.

## 2025-04-28 ENCOUNTER — HOSPITAL ENCOUNTER (OUTPATIENT)
Dept: CT IMAGING | Age: 65
Discharge: HOME | End: 2025-04-30
Attending: FAMILY MEDICINE
Payer: MEDICARE

## 2025-04-28 DIAGNOSIS — Z87.891 PERSONAL HISTORY OF TOBACCO USE: ICD-10-CM

## 2025-04-28 PROCEDURE — 71271 CT THORAX LUNG CANCER SCR C-: CPT

## 2025-04-29 NOTE — RESULT ENCOUNTER NOTE
Let patient know chest CT shows a 3 mm right upper lobe nodule that is considered benign.  We need to do regular CT scans.

## 2025-05-12 ASSESSMENT — ANXIETY QUESTIONNAIRES
3. WORRYING TOO MUCH ABOUT DIFFERENT THINGS: MORE THAN HALF THE DAYS
7. FEELING AFRAID AS IF SOMETHING AWFUL MIGHT HAPPEN: MORE THAN HALF THE DAYS
5. BEING SO RESTLESS THAT IT IS HARD TO SIT STILL: SEVERAL DAYS
6. BECOMING EASILY ANNOYED OR IRRITABLE: MORE THAN HALF THE DAYS
1. FEELING NERVOUS, ANXIOUS, OR ON EDGE: MORE THAN HALF THE DAYS
2. NOT BEING ABLE TO STOP OR CONTROL WORRYING: MORE THAN HALF THE DAYS
7. FEELING AFRAID AS IF SOMETHING AWFUL MIGHT HAPPEN: MORE THAN HALF THE DAYS
1. FEELING NERVOUS, ANXIOUS, OR ON EDGE: MORE THAN HALF THE DAYS
5. BEING SO RESTLESS THAT IT IS HARD TO SIT STILL: SEVERAL DAYS
4. TROUBLE RELAXING: SEVERAL DAYS
IF YOU CHECKED OFF ANY PROBLEMS ON THIS QUESTIONNAIRE, HOW DIFFICULT HAVE THESE PROBLEMS MADE IT FOR YOU TO DO YOUR WORK, TAKE CARE OF THINGS AT HOME, OR GET ALONG WITH OTHER PEOPLE: VERY DIFFICULT
6. BECOMING EASILY ANNOYED OR IRRITABLE: MORE THAN HALF THE DAYS
4. TROUBLE RELAXING: SEVERAL DAYS
IF YOU CHECKED OFF ANY PROBLEMS ON THIS QUESTIONNAIRE, HOW DIFFICULT HAVE THESE PROBLEMS MADE IT FOR YOU TO DO YOUR WORK, TAKE CARE OF THINGS AT HOME, OR GET ALONG WITH OTHER PEOPLE: VERY DIFFICULT
2. NOT BEING ABLE TO STOP OR CONTROL WORRYING: MORE THAN HALF THE DAYS
3. WORRYING TOO MUCH ABOUT DIFFERENT THINGS: MORE THAN HALF THE DAYS
GAD7 TOTAL SCORE: 12

## 2025-05-12 ASSESSMENT — PATIENT HEALTH QUESTIONNAIRE - PHQ9
10. IF YOU CHECKED OFF ANY PROBLEMS, HOW DIFFICULT HAVE THESE PROBLEMS MADE IT FOR YOU TO DO YOUR WORK, TAKE CARE OF THINGS AT HOME, OR GET ALONG WITH OTHER PEOPLE: SOMEWHAT DIFFICULT
6. FEELING BAD ABOUT YOURSELF - OR THAT YOU ARE A FAILURE OR HAVE LET YOURSELF OR YOUR FAMILY DOWN: SEVERAL DAYS
3. TROUBLE FALLING OR STAYING ASLEEP: SEVERAL DAYS
9. THOUGHTS THAT YOU WOULD BE BETTER OFF DEAD, OR OF HURTING YOURSELF: NOT AT ALL
10. IF YOU CHECKED OFF ANY PROBLEMS, HOW DIFFICULT HAVE THESE PROBLEMS MADE IT FOR YOU TO DO YOUR WORK, TAKE CARE OF THINGS AT HOME, OR GET ALONG WITH OTHER PEOPLE: SOMEWHAT DIFFICULT
SUM OF ALL RESPONSES TO PHQ QUESTIONS 1-9: 8
5. POOR APPETITE OR OVEREATING: SEVERAL DAYS
1. LITTLE INTEREST OR PLEASURE IN DOING THINGS: SEVERAL DAYS
2. FEELING DOWN, DEPRESSED OR HOPELESS: SEVERAL DAYS
2. FEELING DOWN, DEPRESSED OR HOPELESS: SEVERAL DAYS
1. LITTLE INTEREST OR PLEASURE IN DOING THINGS: SEVERAL DAYS
6. FEELING BAD ABOUT YOURSELF - OR THAT YOU ARE A FAILURE OR HAVE LET YOURSELF OR YOUR FAMILY DOWN: SEVERAL DAYS
7. TROUBLE CONCENTRATING ON THINGS, SUCH AS READING THE NEWSPAPER OR WATCHING TELEVISION: SEVERAL DAYS
9. THOUGHTS THAT YOU WOULD BE BETTER OFF DEAD, OR OF HURTING YOURSELF: NOT AT ALL
SUM OF ALL RESPONSES TO PHQ QUESTIONS 1-9: 8
8. MOVING OR SPEAKING SO SLOWLY THAT OTHER PEOPLE COULD HAVE NOTICED. OR THE OPPOSITE - BEING SO FIDGETY OR RESTLESS THAT YOU HAVE BEEN MOVING AROUND A LOT MORE THAN USUAL: SEVERAL DAYS
SUM OF ALL RESPONSES TO PHQ QUESTIONS 1-9: 8
3. TROUBLE FALLING OR STAYING ASLEEP: SEVERAL DAYS
SUM OF ALL RESPONSES TO PHQ QUESTIONS 1-9: 8
5. POOR APPETITE OR OVEREATING: SEVERAL DAYS
8. MOVING OR SPEAKING SO SLOWLY THAT OTHER PEOPLE COULD HAVE NOTICED. OR THE OPPOSITE, BEING SO FIGETY OR RESTLESS THAT YOU HAVE BEEN MOVING AROUND A LOT MORE THAN USUAL: SEVERAL DAYS
4. FEELING TIRED OR HAVING LITTLE ENERGY: SEVERAL DAYS
SUM OF ALL RESPONSES TO PHQ QUESTIONS 1-9: 8
4. FEELING TIRED OR HAVING LITTLE ENERGY: SEVERAL DAYS
7. TROUBLE CONCENTRATING ON THINGS, SUCH AS READING THE NEWSPAPER OR WATCHING TELEVISION: SEVERAL DAYS

## 2025-05-13 ENCOUNTER — OFFICE VISIT (OUTPATIENT)
Dept: BEHAVIORAL/MENTAL HEALTH CLINIC | Age: 65
End: 2025-05-13
Payer: MEDICARE

## 2025-05-13 DIAGNOSIS — F41.1 GAD (GENERALIZED ANXIETY DISORDER): ICD-10-CM

## 2025-05-13 DIAGNOSIS — F33.1 MDD (MAJOR DEPRESSIVE DISORDER), RECURRENT EPISODE, MODERATE (HCC): Primary | ICD-10-CM

## 2025-05-13 PROCEDURE — 1036F TOBACCO NON-USER: CPT | Performed by: SOCIAL WORKER

## 2025-05-13 PROCEDURE — 90837 PSYTX W PT 60 MINUTES: CPT | Performed by: SOCIAL WORKER

## 2025-05-13 NOTE — PROGRESS NOTES
INDIVIDUAL THERAPY NOTE  Patient: Polly Delong         Date: 2025   MR#: [id number]              : 1960    IDENTIFYING INFORMATION: This is a 64 y.o. old female who is a patient whom presents to clinic for psychotherapy follow up.     CHIEF COMPLAINT: had concerns including Anxiety and Depression.    DIAGNOSIS: :    1. MDD (major depressive disorder), recurrent episode, moderate (HCC)    2. ANGEL (generalized anxiety disorder)        DURATION: 60 minutes   Start Time: 1030 AM  End Time:  1130 AM    HISTORY OF PRESENT ILLNESS:    Ongoing anxious and depressive stressors. Notes her  to be her main depressive stressor. Describes relationship as having to care for a child. Notes working hard to clean and organize home only for  to undue her work on a consistent basis. Reports dynamic can become so stressful she goes into her room and cries. Reports previous therapist of 15 years encouraged pt to seek divorce. Reports to have met with an  but was unable to follow thru. Explored pt interest in revisiting this choice. Processed pro's and con's of divorce. Pt notes fear of loneliness, although, notes she is lonely now. Describes relationship as \"living with a ghost.\"   Engaged in processing focus on self vs focus on attempting to force  to take better care of himself and their home. Explored avenues within her interests that could provide landscape for increased socialization with others. Pt presents ambivalent at this time. Reports to have purchased a book titled \"Let Them\" related to the stress of living with her .      ___________________________________________________________________________________      INTERVENTION:    Pt presents for f/u appt. Continues to report ongoing stressors related to her marriage. Continues to report frustration with husbands uncleanliness. Reports rainy weather also negatively impacts mood. Reports last Friday especially difficult

## 2025-05-20 ENCOUNTER — TELEPHONE (OUTPATIENT)
Dept: FAMILY MEDICINE CLINIC | Facility: CLINIC | Age: 65
End: 2025-05-20

## 2025-05-20 NOTE — TELEPHONE ENCOUNTER
Form received by fax from  Pharmacy on behalf of [facility] requesting confirmation of active patient. Placed in clinical staff tray in front office for Dr Aiken.

## 2025-05-29 NOTE — TELEPHONE ENCOUNTER
Faxed form received back from  Pharmacy. They are also needing medical records. Placed in clinical staff tray in front office for Dr Aiken.

## 2025-06-07 ASSESSMENT — PATIENT HEALTH QUESTIONNAIRE - PHQ9
10. IF YOU CHECKED OFF ANY PROBLEMS, HOW DIFFICULT HAVE THESE PROBLEMS MADE IT FOR YOU TO DO YOUR WORK, TAKE CARE OF THINGS AT HOME, OR GET ALONG WITH OTHER PEOPLE: NOT DIFFICULT AT ALL
SUM OF ALL RESPONSES TO PHQ QUESTIONS 1-9: 12
8. MOVING OR SPEAKING SO SLOWLY THAT OTHER PEOPLE COULD HAVE NOTICED. OR THE OPPOSITE - BEING SO FIDGETY OR RESTLESS THAT YOU HAVE BEEN MOVING AROUND A LOT MORE THAN USUAL: SEVERAL DAYS
1. LITTLE INTEREST OR PLEASURE IN DOING THINGS: SEVERAL DAYS
SUM OF ALL RESPONSES TO PHQ QUESTIONS 1-9: 12
2. FEELING DOWN, DEPRESSED OR HOPELESS: SEVERAL DAYS
SUM OF ALL RESPONSES TO PHQ QUESTIONS 1-9: 12
1. LITTLE INTEREST OR PLEASURE IN DOING THINGS: SEVERAL DAYS
9. THOUGHTS THAT YOU WOULD BE BETTER OFF DEAD, OR OF HURTING YOURSELF: NOT AT ALL
10. IF YOU CHECKED OFF ANY PROBLEMS, HOW DIFFICULT HAVE THESE PROBLEMS MADE IT FOR YOU TO DO YOUR WORK, TAKE CARE OF THINGS AT HOME, OR GET ALONG WITH OTHER PEOPLE: NOT DIFFICULT AT ALL
SUM OF ALL RESPONSES TO PHQ QUESTIONS 1-9: 12
7. TROUBLE CONCENTRATING ON THINGS, SUCH AS READING THE NEWSPAPER OR WATCHING TELEVISION: SEVERAL DAYS
4. FEELING TIRED OR HAVING LITTLE ENERGY: MORE THAN HALF THE DAYS
2. FEELING DOWN, DEPRESSED OR HOPELESS: SEVERAL DAYS
8. MOVING OR SPEAKING SO SLOWLY THAT OTHER PEOPLE COULD HAVE NOTICED. OR THE OPPOSITE, BEING SO FIGETY OR RESTLESS THAT YOU HAVE BEEN MOVING AROUND A LOT MORE THAN USUAL: SEVERAL DAYS
5. POOR APPETITE OR OVEREATING: MORE THAN HALF THE DAYS
3. TROUBLE FALLING OR STAYING ASLEEP: MORE THAN HALF THE DAYS
3. TROUBLE FALLING OR STAYING ASLEEP: MORE THAN HALF THE DAYS
4. FEELING TIRED OR HAVING LITTLE ENERGY: MORE THAN HALF THE DAYS
6. FEELING BAD ABOUT YOURSELF - OR THAT YOU ARE A FAILURE OR HAVE LET YOURSELF OR YOUR FAMILY DOWN: MORE THAN HALF THE DAYS
SUM OF ALL RESPONSES TO PHQ QUESTIONS 1-9: 12
7. TROUBLE CONCENTRATING ON THINGS, SUCH AS READING THE NEWSPAPER OR WATCHING TELEVISION: SEVERAL DAYS
5. POOR APPETITE OR OVEREATING: MORE THAN HALF THE DAYS
6. FEELING BAD ABOUT YOURSELF - OR THAT YOU ARE A FAILURE OR HAVE LET YOURSELF OR YOUR FAMILY DOWN: MORE THAN HALF THE DAYS
9. THOUGHTS THAT YOU WOULD BE BETTER OFF DEAD, OR OF HURTING YOURSELF: NOT AT ALL

## 2025-06-07 ASSESSMENT — ANXIETY QUESTIONNAIRES
IF YOU CHECKED OFF ANY PROBLEMS ON THIS QUESTIONNAIRE, HOW DIFFICULT HAVE THESE PROBLEMS MADE IT FOR YOU TO DO YOUR WORK, TAKE CARE OF THINGS AT HOME, OR GET ALONG WITH OTHER PEOPLE: SOMEWHAT DIFFICULT
3. WORRYING TOO MUCH ABOUT DIFFERENT THINGS: MORE THAN HALF THE DAYS
6. BECOMING EASILY ANNOYED OR IRRITABLE: MORE THAN HALF THE DAYS
5. BEING SO RESTLESS THAT IT IS HARD TO SIT STILL: SEVERAL DAYS
IF YOU CHECKED OFF ANY PROBLEMS ON THIS QUESTIONNAIRE, HOW DIFFICULT HAVE THESE PROBLEMS MADE IT FOR YOU TO DO YOUR WORK, TAKE CARE OF THINGS AT HOME, OR GET ALONG WITH OTHER PEOPLE: SOMEWHAT DIFFICULT
4. TROUBLE RELAXING: MORE THAN HALF THE DAYS
6. BECOMING EASILY ANNOYED OR IRRITABLE: MORE THAN HALF THE DAYS
7. FEELING AFRAID AS IF SOMETHING AWFUL MIGHT HAPPEN: SEVERAL DAYS
2. NOT BEING ABLE TO STOP OR CONTROL WORRYING: SEVERAL DAYS
3. WORRYING TOO MUCH ABOUT DIFFERENT THINGS: MORE THAN HALF THE DAYS
2. NOT BEING ABLE TO STOP OR CONTROL WORRYING: SEVERAL DAYS
4. TROUBLE RELAXING: MORE THAN HALF THE DAYS
5. BEING SO RESTLESS THAT IT IS HARD TO SIT STILL: SEVERAL DAYS
GAD7 TOTAL SCORE: 10
7. FEELING AFRAID AS IF SOMETHING AWFUL MIGHT HAPPEN: SEVERAL DAYS
1. FEELING NERVOUS, ANXIOUS, OR ON EDGE: SEVERAL DAYS
1. FEELING NERVOUS, ANXIOUS, OR ON EDGE: SEVERAL DAYS

## 2025-06-10 ENCOUNTER — OFFICE VISIT (OUTPATIENT)
Dept: BEHAVIORAL/MENTAL HEALTH CLINIC | Age: 65
End: 2025-06-10

## 2025-06-10 DIAGNOSIS — F33.1 MDD (MAJOR DEPRESSIVE DISORDER), RECURRENT EPISODE, MODERATE (HCC): Primary | ICD-10-CM

## 2025-06-10 DIAGNOSIS — F41.1 GAD (GENERALIZED ANXIETY DISORDER): ICD-10-CM

## 2025-06-10 NOTE — PROGRESS NOTES
INDIVIDUAL THERAPY NOTE  Patient: Polly Delong         Date: 6/10/2025   MR#: [id number]              : 1960    IDENTIFYING INFORMATION: This is a 64 y.o. old female who is a patient whom presents to clinic for psychotherapy follow up.     CHIEF COMPLAINT: had concerns including Anxiety and Depression.    DIAGNOSIS: :    1. MDD (major depressive disorder), recurrent episode, moderate (HCC)    2. ANGEL (generalized anxiety disorder)        DURATION: 50 minutes   Start Time: 940 AM  End Time:  1030 AM    HISTORY OF PRESENT ILLNESS:    Continues to report ongoing stressors related to her marriage. Continues to report frustration with husbands uncleanliness. Reports rainy weather also negatively impacts mood. Reports last Friday especially difficult related to depression. Notes to have cried most of the day becoming sad over myriad of topics. Notes to have accidentally discontinued her medication for 3 days during this span.   Engaged in processing means of coping through acceptance of stressors within environment as pt has no desire to end marriage. Notes this was encouraged by past therapist and friends and at one time sought a . Processed acceptance of husbands poor diet and lifestyle choices as past attempts over many years of encouraging healthier behaviors have been unproductive. Processed focus on acceptance to reduce on stress with focus on self.   _______________________________________  ___________________________________________________________________________________      INTERVENTION:    Pt presents for f/u appt. Reports no significant stressors other than enrolling in unwanted subscription services since last appt. Reports her son was able to help discontinue subscriptions. Notes frustration and mild sadness related to fixed income. Reports to continue to become emotional and sad if thinking about events in the world or commercials on TV. States belief to currently be living  Fusiform Excision Additional Text (Leave Blank If You Do Not Want): The margin was drawn around the clinically apparent lesion.  A fusiform shape was then drawn on the skin incorporating the lesion and margins.  Incisions were then made along these lines to the appropriate tissue plane and the lesion was extirpated.

## 2025-07-05 ASSESSMENT — PATIENT HEALTH QUESTIONNAIRE - PHQ9
7. TROUBLE CONCENTRATING ON THINGS, SUCH AS READING THE NEWSPAPER OR WATCHING TELEVISION: SEVERAL DAYS
10. IF YOU CHECKED OFF ANY PROBLEMS, HOW DIFFICULT HAVE THESE PROBLEMS MADE IT FOR YOU TO DO YOUR WORK, TAKE CARE OF THINGS AT HOME, OR GET ALONG WITH OTHER PEOPLE: NOT DIFFICULT AT ALL
8. MOVING OR SPEAKING SO SLOWLY THAT OTHER PEOPLE COULD HAVE NOTICED. OR THE OPPOSITE, BEING SO FIGETY OR RESTLESS THAT YOU HAVE BEEN MOVING AROUND A LOT MORE THAN USUAL: NOT AT ALL
SUM OF ALL RESPONSES TO PHQ QUESTIONS 1-9: 5
7. TROUBLE CONCENTRATING ON THINGS, SUCH AS READING THE NEWSPAPER OR WATCHING TELEVISION: SEVERAL DAYS
5. POOR APPETITE OR OVEREATING: SEVERAL DAYS
9. THOUGHTS THAT YOU WOULD BE BETTER OFF DEAD, OR OF HURTING YOURSELF: NOT AT ALL
1. LITTLE INTEREST OR PLEASURE IN DOING THINGS: SEVERAL DAYS
SUM OF ALL RESPONSES TO PHQ QUESTIONS 1-9: 5
6. FEELING BAD ABOUT YOURSELF - OR THAT YOU ARE A FAILURE OR HAVE LET YOURSELF OR YOUR FAMILY DOWN: NOT AT ALL
2. FEELING DOWN, DEPRESSED OR HOPELESS: NOT AT ALL
10. IF YOU CHECKED OFF ANY PROBLEMS, HOW DIFFICULT HAVE THESE PROBLEMS MADE IT FOR YOU TO DO YOUR WORK, TAKE CARE OF THINGS AT HOME, OR GET ALONG WITH OTHER PEOPLE: NOT DIFFICULT AT ALL
8. MOVING OR SPEAKING SO SLOWLY THAT OTHER PEOPLE COULD HAVE NOTICED. OR THE OPPOSITE - BEING SO FIDGETY OR RESTLESS THAT YOU HAVE BEEN MOVING AROUND A LOT MORE THAN USUAL: NOT AT ALL
3. TROUBLE FALLING OR STAYING ASLEEP: SEVERAL DAYS
1. LITTLE INTEREST OR PLEASURE IN DOING THINGS: SEVERAL DAYS
5. POOR APPETITE OR OVEREATING: SEVERAL DAYS
3. TROUBLE FALLING OR STAYING ASLEEP: SEVERAL DAYS
4. FEELING TIRED OR HAVING LITTLE ENERGY: SEVERAL DAYS
2. FEELING DOWN, DEPRESSED OR HOPELESS: NOT AT ALL
SUM OF ALL RESPONSES TO PHQ QUESTIONS 1-9: 5
SUM OF ALL RESPONSES TO PHQ QUESTIONS 1-9: 5
4. FEELING TIRED OR HAVING LITTLE ENERGY: SEVERAL DAYS
6. FEELING BAD ABOUT YOURSELF - OR THAT YOU ARE A FAILURE OR HAVE LET YOURSELF OR YOUR FAMILY DOWN: NOT AT ALL
9. THOUGHTS THAT YOU WOULD BE BETTER OFF DEAD, OR OF HURTING YOURSELF: NOT AT ALL
SUM OF ALL RESPONSES TO PHQ QUESTIONS 1-9: 5

## 2025-07-05 ASSESSMENT — ANXIETY QUESTIONNAIRES
GAD7 TOTAL SCORE: 7
2. NOT BEING ABLE TO STOP OR CONTROL WORRYING: NOT AT ALL
6. BECOMING EASILY ANNOYED OR IRRITABLE: MORE THAN HALF THE DAYS
1. FEELING NERVOUS, ANXIOUS, OR ON EDGE: SEVERAL DAYS
IF YOU CHECKED OFF ANY PROBLEMS ON THIS QUESTIONNAIRE, HOW DIFFICULT HAVE THESE PROBLEMS MADE IT FOR YOU TO DO YOUR WORK, TAKE CARE OF THINGS AT HOME, OR GET ALONG WITH OTHER PEOPLE: NOT DIFFICULT AT ALL
IF YOU CHECKED OFF ANY PROBLEMS ON THIS QUESTIONNAIRE, HOW DIFFICULT HAVE THESE PROBLEMS MADE IT FOR YOU TO DO YOUR WORK, TAKE CARE OF THINGS AT HOME, OR GET ALONG WITH OTHER PEOPLE: NOT DIFFICULT AT ALL
7. FEELING AFRAID AS IF SOMETHING AWFUL MIGHT HAPPEN: MORE THAN HALF THE DAYS
4. TROUBLE RELAXING: SEVERAL DAYS
5. BEING SO RESTLESS THAT IT IS HARD TO SIT STILL: NOT AT ALL
2. NOT BEING ABLE TO STOP OR CONTROL WORRYING: NOT AT ALL
3. WORRYING TOO MUCH ABOUT DIFFERENT THINGS: SEVERAL DAYS
3. WORRYING TOO MUCH ABOUT DIFFERENT THINGS: SEVERAL DAYS
4. TROUBLE RELAXING: SEVERAL DAYS
6. BECOMING EASILY ANNOYED OR IRRITABLE: MORE THAN HALF THE DAYS
1. FEELING NERVOUS, ANXIOUS, OR ON EDGE: SEVERAL DAYS
7. FEELING AFRAID AS IF SOMETHING AWFUL MIGHT HAPPEN: MORE THAN HALF THE DAYS
5. BEING SO RESTLESS THAT IT IS HARD TO SIT STILL: NOT AT ALL

## 2025-07-08 ENCOUNTER — OFFICE VISIT (OUTPATIENT)
Dept: BEHAVIORAL/MENTAL HEALTH CLINIC | Age: 65
End: 2025-07-08
Payer: MEDICARE

## 2025-07-08 DIAGNOSIS — F41.1 GAD (GENERALIZED ANXIETY DISORDER): ICD-10-CM

## 2025-07-08 DIAGNOSIS — F33.1 MDD (MAJOR DEPRESSIVE DISORDER), RECURRENT EPISODE, MODERATE (HCC): Primary | ICD-10-CM

## 2025-07-08 PROCEDURE — 1036F TOBACCO NON-USER: CPT | Performed by: SOCIAL WORKER

## 2025-07-08 PROCEDURE — 90834 PSYTX W PT 45 MINUTES: CPT | Performed by: SOCIAL WORKER

## 2025-07-08 NOTE — PROGRESS NOTES
pleasure in doing things 1 1 1   Feeling down, depressed, or hopeless 0 1 1   Trouble falling or staying asleep, or sleeping too much 1 2 1   Feeling tired or having little energy 1 2 1   Poor appetite or overeating 1 2 1   Feeling bad about yourself - or that you are a failure or have let yourself or your family down 0 2 1   Trouble concentrating on things, such as reading the newspaper or watching television 1 1 1   Moving or speaking so slowly that other people could have noticed. Or the opposite - being so fidgety or restless that you have been moving around a lot more than usual 0 1 1   Thoughts that you would be better off dead, or of hurting yourself in some way 0 0 0   PHQ-2 Score 1  2  2    PHQ-9 Total Score 5  12  8    If you checked off any problems, how difficult have these problems made it for you to do your work, take care of things at home, or get along with other people? 0 0 1       Patient-reported      GAD7:      7/5/2025     7:21 AM 6/7/2025    10:11 AM 5/12/2025    12:53 PM   ANGEL-7 SCREENING   Feeling nervous, anxious, or on edge Several days Several days More than half the days   Not being able to stop or control worrying Not at all Several days More than half the days   Worrying too much about different things Several days More than half the days More than half the days   Trouble relaxing Several days More than half the days Several days   Being so restless that it is hard to sit still Not at all Several days Several days   Becoming easily annoyed or irritable More than half the days More than half the days More than half the days   Feeling afraid as if something awful might happen More than half the days Several days More than half the days   ANGEL-7 Total Score 7  10  12    How difficult have these problems made it for you to do your work, take care of things at home, or get along with other people? Not difficult at all Somewhat difficult Very difficult       Patient-reported           PLAN:

## 2025-08-09 ASSESSMENT — PATIENT HEALTH QUESTIONNAIRE - PHQ9
2. FEELING DOWN, DEPRESSED OR HOPELESS: SEVERAL DAYS
6. FEELING BAD ABOUT YOURSELF - OR THAT YOU ARE A FAILURE OR HAVE LET YOURSELF OR YOUR FAMILY DOWN: SEVERAL DAYS
3. TROUBLE FALLING OR STAYING ASLEEP: NOT AT ALL
1. LITTLE INTEREST OR PLEASURE IN DOING THINGS: SEVERAL DAYS
5. POOR APPETITE OR OVEREATING: SEVERAL DAYS
7. TROUBLE CONCENTRATING ON THINGS, SUCH AS READING THE NEWSPAPER OR WATCHING TELEVISION: SEVERAL DAYS
SUM OF ALL RESPONSES TO PHQ QUESTIONS 1-9: 8
10. IF YOU CHECKED OFF ANY PROBLEMS, HOW DIFFICULT HAVE THESE PROBLEMS MADE IT FOR YOU TO DO YOUR WORK, TAKE CARE OF THINGS AT HOME, OR GET ALONG WITH OTHER PEOPLE: SOMEWHAT DIFFICULT
3. TROUBLE FALLING OR STAYING ASLEEP: NOT AT ALL
8. MOVING OR SPEAKING SO SLOWLY THAT OTHER PEOPLE COULD HAVE NOTICED. OR THE OPPOSITE - BEING SO FIDGETY OR RESTLESS THAT YOU HAVE BEEN MOVING AROUND A LOT MORE THAN USUAL: SEVERAL DAYS
7. TROUBLE CONCENTRATING ON THINGS, SUCH AS READING THE NEWSPAPER OR WATCHING TELEVISION: SEVERAL DAYS
9. THOUGHTS THAT YOU WOULD BE BETTER OFF DEAD, OR OF HURTING YOURSELF: NOT AT ALL
1. LITTLE INTEREST OR PLEASURE IN DOING THINGS: SEVERAL DAYS
SUM OF ALL RESPONSES TO PHQ QUESTIONS 1-9: 8
5. POOR APPETITE OR OVEREATING: SEVERAL DAYS
10. IF YOU CHECKED OFF ANY PROBLEMS, HOW DIFFICULT HAVE THESE PROBLEMS MADE IT FOR YOU TO DO YOUR WORK, TAKE CARE OF THINGS AT HOME, OR GET ALONG WITH OTHER PEOPLE: SOMEWHAT DIFFICULT
SUM OF ALL RESPONSES TO PHQ QUESTIONS 1-9: 8
4. FEELING TIRED OR HAVING LITTLE ENERGY: MORE THAN HALF THE DAYS
4. FEELING TIRED OR HAVING LITTLE ENERGY: MORE THAN HALF THE DAYS
6. FEELING BAD ABOUT YOURSELF - OR THAT YOU ARE A FAILURE OR HAVE LET YOURSELF OR YOUR FAMILY DOWN: SEVERAL DAYS
8. MOVING OR SPEAKING SO SLOWLY THAT OTHER PEOPLE COULD HAVE NOTICED. OR THE OPPOSITE, BEING SO FIGETY OR RESTLESS THAT YOU HAVE BEEN MOVING AROUND A LOT MORE THAN USUAL: SEVERAL DAYS
2. FEELING DOWN, DEPRESSED OR HOPELESS: SEVERAL DAYS
SUM OF ALL RESPONSES TO PHQ QUESTIONS 1-9: 8
SUM OF ALL RESPONSES TO PHQ QUESTIONS 1-9: 8
9. THOUGHTS THAT YOU WOULD BE BETTER OFF DEAD, OR OF HURTING YOURSELF: NOT AT ALL

## 2025-08-09 ASSESSMENT — ANXIETY QUESTIONNAIRES
6. BECOMING EASILY ANNOYED OR IRRITABLE: SEVERAL DAYS
IF YOU CHECKED OFF ANY PROBLEMS ON THIS QUESTIONNAIRE, HOW DIFFICULT HAVE THESE PROBLEMS MADE IT FOR YOU TO DO YOUR WORK, TAKE CARE OF THINGS AT HOME, OR GET ALONG WITH OTHER PEOPLE: SOMEWHAT DIFFICULT
5. BEING SO RESTLESS THAT IT IS HARD TO SIT STILL: SEVERAL DAYS
IF YOU CHECKED OFF ANY PROBLEMS ON THIS QUESTIONNAIRE, HOW DIFFICULT HAVE THESE PROBLEMS MADE IT FOR YOU TO DO YOUR WORK, TAKE CARE OF THINGS AT HOME, OR GET ALONG WITH OTHER PEOPLE: SOMEWHAT DIFFICULT
1. FEELING NERVOUS, ANXIOUS, OR ON EDGE: SEVERAL DAYS
2. NOT BEING ABLE TO STOP OR CONTROL WORRYING: SEVERAL DAYS
5. BEING SO RESTLESS THAT IT IS HARD TO SIT STILL: SEVERAL DAYS
4. TROUBLE RELAXING: MORE THAN HALF THE DAYS
2. NOT BEING ABLE TO STOP OR CONTROL WORRYING: SEVERAL DAYS
3. WORRYING TOO MUCH ABOUT DIFFERENT THINGS: SEVERAL DAYS
GAD7 TOTAL SCORE: 7
3. WORRYING TOO MUCH ABOUT DIFFERENT THINGS: SEVERAL DAYS
1. FEELING NERVOUS, ANXIOUS, OR ON EDGE: SEVERAL DAYS
6. BECOMING EASILY ANNOYED OR IRRITABLE: SEVERAL DAYS
7. FEELING AFRAID AS IF SOMETHING AWFUL MIGHT HAPPEN: NOT AT ALL
7. FEELING AFRAID AS IF SOMETHING AWFUL MIGHT HAPPEN: NOT AT ALL
4. TROUBLE RELAXING: MORE THAN HALF THE DAYS

## 2025-08-12 ENCOUNTER — OFFICE VISIT (OUTPATIENT)
Dept: BEHAVIORAL/MENTAL HEALTH CLINIC | Age: 65
End: 2025-08-12
Payer: MEDICARE

## 2025-08-12 DIAGNOSIS — F33.1 MDD (MAJOR DEPRESSIVE DISORDER), RECURRENT EPISODE, MODERATE (HCC): Primary | ICD-10-CM

## 2025-08-12 DIAGNOSIS — F41.1 GAD (GENERALIZED ANXIETY DISORDER): ICD-10-CM

## 2025-08-12 PROCEDURE — 90837 PSYTX W PT 60 MINUTES: CPT | Performed by: SOCIAL WORKER

## 2025-08-12 PROCEDURE — 1036F TOBACCO NON-USER: CPT | Performed by: SOCIAL WORKER

## (undated) DEVICE — SOLUTION IRRIG 1000ML 09% SOD CHL USP PIC PLAS CONTAINER

## (undated) DEVICE — MASTISOL ADHESIVE LIQ 2/3ML

## (undated) DEVICE — SUTURE VCRL SZ 3-0 L18IN ABSRB UD W/O NDL POLYGLACTIN 910 J110T

## (undated) DEVICE — REM POLYHESIVE ADULT PATIENT RETURN ELECTRODE: Brand: VALLEYLAB

## (undated) DEVICE — SUTURE VCRL SZ 3-0 L27IN ABSRB UD L26MM SH 1/2 CIR J416H

## (undated) DEVICE — BNDG,ELSTC,MATRIX,STRL,3"X5YD,LF,HOOK&LP: Brand: MEDLINE

## (undated) DEVICE — SYRINGE IRRIG 60ML SFT PLIABLE BLB EZ TO GRP 1 HND USE W/

## (undated) DEVICE — (D)PREP SKN CHLRAPRP APPL 26ML -- CONVERT TO ITEM 371833

## (undated) DEVICE — 1010 S-DRAPE TOWEL DRAPE 10/BX: Brand: STERI-DRAPE™

## (undated) DEVICE — WAX SURG 2.5GM HEMSTAT BNE BEESWAX PARAFFIN ISO PALMITATE

## (undated) DEVICE — Device: Brand: SPOT EX ENDOSCOPIC TATTOO

## (undated) DEVICE — CONNECTOR TBNG OD5-7MM O2 END DISP

## (undated) DEVICE — TRAY CATH 16F URIN MTR LTX -- CONVERT TO ITEM 363111

## (undated) DEVICE — GLOVE SURG SZ 65 THK91MIL LTX FREE SYN POLYISOPRENE

## (undated) DEVICE — KENDALL RADIOLUCENT FOAM MONITORING ELECTRODE RECTANGULAR SHAPE: Brand: KENDALL

## (undated) DEVICE — NDL PRT INJ NSAF BLNT 18GX1.5 --

## (undated) DEVICE — SYR 5ML 1/5 GRAD LL NSAF LF --

## (undated) DEVICE — DRAPE,TOP,102X53,STERILE: Brand: MEDLINE

## (undated) DEVICE — 2000CC GUARDIAN II: Brand: GUARDIAN

## (undated) DEVICE — DRAPE SHT 3 QTR PROXIMA 53X77 --

## (undated) DEVICE — HAND PACK: Brand: MEDLINE INDUSTRIES, INC.

## (undated) DEVICE — CONTAINER PREFIL FRMLN 40ML --

## (undated) DEVICE — ZIMMER® STERILE DISPOSABLE TOURNIQUET CUFF WITH PLC, DUAL PORT, SINGLE BLADDER, 18 IN. (46 CM)

## (undated) DEVICE — SOLUTION IV 1000ML 0.9% SOD CHL

## (undated) DEVICE — DRESSING,GAUZE,XEROFORM,CURAD,1"X8",ST: Brand: CURAD

## (undated) DEVICE — SUTURE VCRL SZ 2-0 L27IN ABSRB UD L26MM CT-2 1/2 CIR J269H

## (undated) DEVICE — SUTURE ETHLN SZ 4-0 L18IN NONABSORBABLE BLK L19MM PS-2 3/8 1667H

## (undated) DEVICE — DRAPE TWL SURG 16X26IN BLU ORB04] ALLCARE INC]

## (undated) DEVICE — HYPODERMIC SAFETY NEEDLE: Brand: MAGELLAN

## (undated) DEVICE — INTENDED FOR TISSUE SEPARATION, AND OTHER PROCEDURES THAT REQUIRE A SHARP SURGICAL BLADE TO PUNCTURE OR CUT.: Brand: BARD-PARKER SAFETY BLADES SIZE 15, STERILE

## (undated) DEVICE — SUTURE ETHBND EXCEL SZ 3-0 L30IN NONABSORBABLE GRN L26MM SH X832H

## (undated) DEVICE — SUTURE MCRYL SZ 4-0 L27IN ABSRB UD L19MM PS-2 1/2 CIR PRIM Y426H

## (undated) DEVICE — SLING ARM M PCH 8X17IN STRP 2 37IN LT BLU COT FOAM SHLDR PD

## (undated) DEVICE — GLOVE SURG SZ 65 L12IN FNGR THK79MIL GRN LTX FREE

## (undated) DEVICE — SPLINT THMB W3XL12IN FBRGLS PD PRECUT LTWT DURABLE FAST SET

## (undated) DEVICE — SYR 3ML LL TIP 1/10ML GRAD --

## (undated) DEVICE — CANNULA NSL ORAL AD FOR CAPNOFLEX CO2 O2 AIRLFE

## (undated) DEVICE — DRAPE TBL W72XH34IN D30IN SGL PC DISPOSABLE

## (undated) DEVICE — SUTURE VCRL SZ 0 L27IN ABSRB VLT L26MM CT-2 1/2 CIR J334H

## (undated) DEVICE — PENCIL ES L12IN BAYNT MPLR DISP BOVIE

## (undated) DEVICE — TOOL T12MH25 LEGEND 12CM 2.5MM MH: Brand: MIDAS REX ™

## (undated) DEVICE — BNDG,ELSTC,MATRIX,STRL,2"X5YD,LF,HOOK&LP: Brand: MEDLINE

## (undated) DEVICE — SUT SLK 2-0SH 30IN BLK --

## (undated) DEVICE — STOCKINETTE CAST W76XL2280CM POLY SYN FN KNIT RIB

## (undated) DEVICE — NEEDLE SPNL 22GA L3.5IN BLK HUB S STL REG WALL FIT STYL W/

## (undated) DEVICE — SURGICAL PROCEDURE PACK MIN CV CDS

## (undated) DEVICE — (D)STRIP SKN CLSR 0.5X4IN WHT --

## (undated) DEVICE — CONTAINER COLL/TRNSPRT BX BRST -- E-Z-EM CAT 8390

## (undated) DEVICE — NEEDLE INJ 25GA P5MM SHFT L230CM SHTH DIA2.5MM S STL TEF

## (undated) DEVICE — DISPOSABLE BIPOLAR CODE, 12' (3.66 M): Brand: CONMED

## (undated) DEVICE — SHEET, DRAPE, SPLIT, STERILE: Brand: MEDLINE

## (undated) DEVICE — (D)SYR 10ML 1/5ML GRAD NSAF -- PKGING CHANGE USE ITEM 338027

## (undated) DEVICE — BAND RUB 1/8X2.5IN STRL --

## (undated) DEVICE — KIT BNE BX NDL 11GA L12CM CANN 14GA L15CM VERT BODY COAX

## (undated) DEVICE — SUTURE VCRL SZ 3-0 L18IN ABSRB VLT L26MM SH 1/2 CIR J774D

## (undated) DEVICE — SUTURE VCRL SZ 0 L18IN ABSRB VLT L26MM CT-2 1/2 CIR J727D

## (undated) DEVICE — SURGICAL PROCEDURE PACK BASIC ST FRANCIS

## (undated) DEVICE — DRAPE,U/SHT,SPLIT,FILM,60X84,STERILE: Brand: MEDLINE

## (undated) DEVICE — CONTAINER,SPECIMEN,O.R.STRL,4.5OZ: Brand: MEDLINE

## (undated) DEVICE — KENDALL SCD EXPRESS SLEEVES, KNEE LENGTH, MEDIUM: Brand: KENDALL SCD

## (undated) DEVICE — SURGIFOAM SPNG SZ 100

## (undated) DEVICE — PADDING CAST W3INXL4YD COT BLEND MIC PLEAT UNDERCAST SPEC

## (undated) DEVICE — SLIM BODY SKIN STAPLER: Brand: APPOSE ULC

## (undated) DEVICE — BANDAGE,GAUZE,CONFORMING,2"X75",STRL,LF: Brand: MEDLINE INDUSTRIES, INC.

## (undated) DEVICE — INSTRUMENT 8670002 SXT GUIDEWIRE SHARP

## (undated) DEVICE — DRAIN SURG 10FR L1/8IN DIA3.2MM SIL CHN RND HUBLESS FULL

## (undated) DEVICE — DRAPE MICSCP W51XL150IN FOR LEICA M680 WILD OHS

## (undated) DEVICE — PADDING CAST COHESIVE 4 YDX3 IN HND TEARABLE COTTON SPEC 100

## (undated) DEVICE — SUTURE VCRL SZ 2-0 L27IN ABSRB VLT L26MM UR-6 5/8 CIR J602H

## (undated) DEVICE — SNARE ENDOSCP L240CM LOOP W27MM SHTH DIA2.4MM M CRESC STIFF

## (undated) DEVICE — Device